# Patient Record
Sex: MALE | Race: WHITE | NOT HISPANIC OR LATINO | Employment: FULL TIME | URBAN - METROPOLITAN AREA
[De-identification: names, ages, dates, MRNs, and addresses within clinical notes are randomized per-mention and may not be internally consistent; named-entity substitution may affect disease eponyms.]

---
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
## 2021-05-18 ENCOUNTER — TELEPHONE (OUTPATIENT)
Dept: ORTHOPEDICS | Facility: CLINIC | Age: 21
End: 2021-05-18

## 2021-05-18 DIAGNOSIS — G89.29 CHRONIC PAIN OF LEFT KNEE: Primary | ICD-10-CM

## 2021-05-18 DIAGNOSIS — M25.562 CHRONIC PAIN OF LEFT KNEE: Primary | ICD-10-CM

## 2021-05-19 ENCOUNTER — OFFICE VISIT (OUTPATIENT)
Dept: ORTHOPEDICS | Facility: CLINIC | Age: 21
End: 2021-05-19
Payer: MEDICAID

## 2021-05-19 ENCOUNTER — TELEPHONE (OUTPATIENT)
Dept: ADMINISTRATIVE | Facility: OTHER | Age: 21
End: 2021-05-19

## 2021-05-19 ENCOUNTER — HOSPITAL ENCOUNTER (OUTPATIENT)
Dept: RADIOLOGY | Facility: HOSPITAL | Age: 21
Discharge: HOME | End: 2021-05-19
Attending: ORTHOPAEDIC SURGERY
Payer: MEDICAID

## 2021-05-19 VITALS
DIASTOLIC BLOOD PRESSURE: 83 MMHG | SYSTOLIC BLOOD PRESSURE: 133 MMHG | HEIGHT: 66 IN | BODY MASS INDEX: 26.31 KG/M2 | HEART RATE: 73 BPM | WEIGHT: 163.69 LBS

## 2021-05-19 DIAGNOSIS — M25.461 EFFUSION OF RIGHT KNEE: ICD-10-CM

## 2021-05-19 DIAGNOSIS — M25.561 ACUTE PAIN OF RIGHT KNEE: Primary | ICD-10-CM

## 2021-05-19 DIAGNOSIS — M25.562 CHRONIC PAIN OF LEFT KNEE: ICD-10-CM

## 2021-05-19 DIAGNOSIS — G89.29 CHRONIC PAIN OF RIGHT KNEE: ICD-10-CM

## 2021-05-19 DIAGNOSIS — G89.29 CHRONIC PAIN OF LEFT KNEE: ICD-10-CM

## 2021-05-19 DIAGNOSIS — M25.561 CHRONIC PAIN OF RIGHT KNEE: ICD-10-CM

## 2021-05-19 PROCEDURE — 99204 OFFICE O/P NEW MOD 45 MIN: CPT | Mod: S$PBB,,, | Performed by: ORTHOPAEDIC SURGERY

## 2021-05-19 PROCEDURE — 73562 X-RAY EXAM OF KNEE 3: CPT | Mod: 26,RT,, | Performed by: RADIOLOGY

## 2021-05-19 PROCEDURE — 99999 PR PBB SHADOW E&M-EST. PATIENT-LVL III: ICD-10-PCS | Mod: PBBFAC,,, | Performed by: ORTHOPAEDIC SURGERY

## 2021-05-19 PROCEDURE — 73562 X-RAY EXAM OF KNEE 3: CPT | Mod: TC,FY,RT

## 2021-05-19 PROCEDURE — 99204 PR OFFICE/OUTPT VISIT, NEW, LEVL IV, 45-59 MIN: ICD-10-PCS | Mod: S$PBB,,, | Performed by: ORTHOPAEDIC SURGERY

## 2021-05-19 PROCEDURE — 99213 OFFICE O/P EST LOW 20 MIN: CPT | Mod: PBBFAC,25,PN | Performed by: ORTHOPAEDIC SURGERY

## 2021-05-19 PROCEDURE — 73562 XR KNEE 3 VIEW RIGHT: ICD-10-PCS | Mod: 26,RT,, | Performed by: RADIOLOGY

## 2021-05-19 PROCEDURE — 99999 PR PBB SHADOW E&M-EST. PATIENT-LVL III: CPT | Mod: PBBFAC,,, | Performed by: ORTHOPAEDIC SURGERY

## 2021-05-21 ENCOUNTER — TELEPHONE (OUTPATIENT)
Dept: ADMINISTRATIVE | Facility: OTHER | Age: 21
End: 2021-05-21

## 2021-05-28 ENCOUNTER — HOSPITAL ENCOUNTER (OUTPATIENT)
Dept: RADIOLOGY | Facility: HOSPITAL | Age: 21
Discharge: HOME | End: 2021-05-28
Attending: ORTHOPAEDIC SURGERY
Payer: MEDICAID

## 2021-05-28 DIAGNOSIS — M25.461 EFFUSION OF RIGHT KNEE: ICD-10-CM

## 2021-05-28 PROCEDURE — 73721 MRI KNEE WITHOUT CONTRAST RIGHT: ICD-10-PCS | Mod: 26,RT,, | Performed by: RADIOLOGY

## 2021-05-28 PROCEDURE — 73721 MRI JNT OF LWR EXTRE W/O DYE: CPT | Mod: TC,RT

## 2021-05-28 PROCEDURE — 73721 MRI JNT OF LWR EXTRE W/O DYE: CPT | Mod: 26,RT,, | Performed by: RADIOLOGY

## 2021-06-02 ENCOUNTER — OFFICE VISIT (OUTPATIENT)
Dept: ORTHOPEDICS | Facility: CLINIC | Age: 21
End: 2021-06-02
Payer: MEDICAID

## 2021-06-02 DIAGNOSIS — G89.29 CHRONIC PAIN OF LEFT KNEE: Primary | ICD-10-CM

## 2021-06-02 DIAGNOSIS — M25.562 CHRONIC PAIN OF LEFT KNEE: Primary | ICD-10-CM

## 2021-06-02 PROCEDURE — 99499 NO LOS: ICD-10-PCS | Mod: 95,,, | Performed by: ORTHOPAEDIC SURGERY

## 2021-06-02 PROCEDURE — 99499 UNLISTED E&M SERVICE: CPT | Mod: 95,,, | Performed by: ORTHOPAEDIC SURGERY

## 2021-12-17 ENCOUNTER — TELEPHONE (OUTPATIENT)
Dept: ORTHOPEDICS | Facility: CLINIC | Age: 21
End: 2021-12-17
Payer: MEDICAID

## 2021-12-17 DIAGNOSIS — M25.511 RIGHT SHOULDER PAIN, UNSPECIFIED CHRONICITY: Primary | ICD-10-CM

## 2021-12-20 ENCOUNTER — OFFICE VISIT (OUTPATIENT)
Dept: ORTHOPEDICS | Facility: CLINIC | Age: 21
End: 2021-12-20
Payer: MEDICAID

## 2021-12-20 ENCOUNTER — HOSPITAL ENCOUNTER (OUTPATIENT)
Dept: RADIOLOGY | Facility: HOSPITAL | Age: 21
Discharge: HOME | End: 2021-12-20
Attending: ORTHOPAEDIC SURGERY
Payer: MEDICAID

## 2021-12-20 VITALS — HEIGHT: 66 IN | BODY MASS INDEX: 26.79 KG/M2 | WEIGHT: 166.69 LBS

## 2021-12-20 DIAGNOSIS — M25.511 RIGHT SHOULDER PAIN, UNSPECIFIED CHRONICITY: ICD-10-CM

## 2021-12-20 DIAGNOSIS — S43.431A TEAR OF RIGHT GLENOID LABRUM, INITIAL ENCOUNTER: Primary | ICD-10-CM

## 2021-12-20 PROCEDURE — 73030 XR SHOULDER COMPLETE 2 OR MORE VIEWS RIGHT: ICD-10-PCS | Mod: 26,RT,, | Performed by: RADIOLOGY

## 2021-12-20 PROCEDURE — 99999 PR PBB SHADOW E&M-EST. PATIENT-LVL III: ICD-10-PCS | Mod: PBBFAC,,, | Performed by: ORTHOPAEDIC SURGERY

## 2021-12-20 PROCEDURE — 20610 LARGE JOINT ASPIRATION/INJECTION: R GLENOHUMERAL: ICD-10-PCS | Mod: S$PBB,RT,, | Performed by: ORTHOPAEDIC SURGERY

## 2021-12-20 PROCEDURE — 99213 OFFICE O/P EST LOW 20 MIN: CPT | Mod: PBBFAC,PN,25 | Performed by: ORTHOPAEDIC SURGERY

## 2021-12-20 PROCEDURE — 99999 PR PBB SHADOW E&M-EST. PATIENT-LVL III: CPT | Mod: PBBFAC,,, | Performed by: ORTHOPAEDIC SURGERY

## 2021-12-20 PROCEDURE — 73030 X-RAY EXAM OF SHOULDER: CPT | Mod: TC,FY,RT

## 2021-12-20 PROCEDURE — 99214 OFFICE O/P EST MOD 30 MIN: CPT | Mod: 25,S$PBB,, | Performed by: ORTHOPAEDIC SURGERY

## 2021-12-20 PROCEDURE — 20610 DRAIN/INJ JOINT/BURSA W/O US: CPT | Mod: PBBFAC,PN | Performed by: ORTHOPAEDIC SURGERY

## 2021-12-20 PROCEDURE — 73030 X-RAY EXAM OF SHOULDER: CPT | Mod: 26,RT,, | Performed by: RADIOLOGY

## 2021-12-20 PROCEDURE — 99214 PR OFFICE/OUTPT VISIT, EST, LEVL IV, 30-39 MIN: ICD-10-PCS | Mod: 25,S$PBB,, | Performed by: ORTHOPAEDIC SURGERY

## 2021-12-20 RX ORDER — TRIAMCINOLONE ACETONIDE 40 MG/ML
40 INJECTION, SUSPENSION INTRA-ARTICULAR; INTRAMUSCULAR
Status: DISCONTINUED | OUTPATIENT
Start: 2021-12-20 | End: 2021-12-20 | Stop reason: HOSPADM

## 2021-12-20 RX ADMIN — TRIAMCINOLONE ACETONIDE 40 MG: 40 INJECTION, SUSPENSION INTRA-ARTICULAR; INTRAMUSCULAR at 03:12

## 2021-12-27 ENCOUNTER — PATIENT MESSAGE (OUTPATIENT)
Dept: ORTHOPEDICS | Facility: CLINIC | Age: 21
End: 2021-12-27
Payer: MEDICAID

## 2021-12-27 DIAGNOSIS — S43.431A TEAR OF RIGHT GLENOID LABRUM, INITIAL ENCOUNTER: Primary | ICD-10-CM

## 2021-12-27 DIAGNOSIS — M25.511 ACUTE PAIN OF RIGHT SHOULDER: ICD-10-CM

## 2022-01-14 ENCOUNTER — HOSPITAL ENCOUNTER (OUTPATIENT)
Dept: INTERVENTIONAL RADIOLOGY/VASCULAR | Facility: HOSPITAL | Age: 22
Discharge: HOME | End: 2022-01-14
Attending: ORTHOPAEDIC SURGERY
Payer: MEDICAID

## 2022-01-14 ENCOUNTER — HOSPITAL ENCOUNTER (OUTPATIENT)
Dept: RADIOLOGY | Facility: HOSPITAL | Age: 22
Discharge: HOME | End: 2022-01-14
Attending: ORTHOPAEDIC SURGERY
Payer: MEDICAID

## 2022-01-14 VITALS
WEIGHT: 165 LBS | SYSTOLIC BLOOD PRESSURE: 127 MMHG | TEMPERATURE: 98 F | BODY MASS INDEX: 26.52 KG/M2 | RESPIRATION RATE: 18 BRPM | HEART RATE: 85 BPM | DIASTOLIC BLOOD PRESSURE: 60 MMHG | HEIGHT: 66 IN | OXYGEN SATURATION: 100 %

## 2022-01-14 DIAGNOSIS — M25.511 ACUTE PAIN OF RIGHT SHOULDER: ICD-10-CM

## 2022-01-14 DIAGNOSIS — S43.431A TEAR OF RIGHT GLENOID LABRUM, INITIAL ENCOUNTER: ICD-10-CM

## 2022-01-14 PROCEDURE — 25000003 PHARM REV CODE 250: Performed by: RADIOLOGY

## 2022-01-14 PROCEDURE — 73222 MRI JOINT UPR EXTREM W/DYE: CPT | Mod: 26,RT,, | Performed by: RADIOLOGY

## 2022-01-14 PROCEDURE — 77002 NEEDLE LOCALIZATION BY XRAY: CPT | Mod: 26,,, | Performed by: RADIOLOGY

## 2022-01-14 PROCEDURE — A9585 GADOBUTROL INJECTION: HCPCS | Performed by: ORTHOPAEDIC SURGERY

## 2022-01-14 PROCEDURE — 73040 CONTRAST X-RAY OF SHOULDER: CPT | Mod: TC,RT

## 2022-01-14 PROCEDURE — 23350 INJECTION FOR SHOULDER X-RAY: CPT | Mod: RT,,, | Performed by: RADIOLOGY

## 2022-01-14 PROCEDURE — 77002 XR ARTHROGRAM SHOULDER RIGHT: ICD-10-PCS | Mod: 26,,, | Performed by: RADIOLOGY

## 2022-01-14 PROCEDURE — 23350 XR ARTHROGRAM SHOULDER RIGHT: ICD-10-PCS | Mod: RT,,, | Performed by: RADIOLOGY

## 2022-01-14 PROCEDURE — 73222 MRI ARTHROGRAM SHOULDER WITH CONTRAST RIGHT: ICD-10-PCS | Mod: 26,RT,, | Performed by: RADIOLOGY

## 2022-01-14 PROCEDURE — 73222 MRI JOINT UPR EXTREM W/DYE: CPT | Mod: TC,RT

## 2022-01-14 PROCEDURE — 25500020 PHARM REV CODE 255: Performed by: ORTHOPAEDIC SURGERY

## 2022-01-14 RX ORDER — GADOBUTROL 604.72 MG/ML
1 INJECTION INTRAVENOUS
Status: COMPLETED | OUTPATIENT
Start: 2022-01-14 | End: 2022-01-14

## 2022-01-14 RX ORDER — LIDOCAINE HYDROCHLORIDE 10 MG/ML
INJECTION INFILTRATION; PERINEURAL CODE/TRAUMA/SEDATION MEDICATION
Status: DISCONTINUED | OUTPATIENT
Start: 2022-01-14 | End: 2022-01-15 | Stop reason: HOSPADM

## 2022-01-14 RX ADMIN — LIDOCAINE HYDROCHLORIDE 5 ML: 10 INJECTION, SOLUTION INFILTRATION; PERINEURAL at 03:01

## 2022-01-14 RX ADMIN — GADOBUTROL 1 ML: 604.72 INJECTION INTRAVENOUS at 03:01

## 2022-01-14 RX ADMIN — IOHEXOL 10 ML: 300 INJECTION, SOLUTION INTRAVENOUS at 03:01

## 2022-01-14 NOTE — PROCEDURES
Interventional Radiology Immediate Post-Procedure Note    Pre-Op Diagnosis: RIGHT shoulder pain  Post-Op Diagnosis: Same    Procedure: Fluoro guided arthrogram    Procedure performed by: Luis Moss MD  Assistants: None    Estimated Blood Loss: Minimal  Specimen Removed: No    Findings/description of procedure:  9 mL of a 50-50 mixture of 1% Gadolinium and Omnipaque-300 administered into RIGHT glenohumeral joint.    No immediate complications. Patient tolerated procedure well. Please see full dictated procedure report for additional details and recommendations.      Luis Moss MD  [facility]  Pager [phone]

## 2022-01-14 NOTE — H&P
Interventional Radiology Pre-Procedure History & Physical      Chief Complaint/Reason for Referral: RIGHT shoulder pain    History of Present Illness:  Long Brunner is a 21 y.o. male who presents with right shoulder pain. Referred to IR for arthrogram prior to MRI.    Past Medical History:   Diagnosis Date    Fractures      History reviewed. No pertinent surgical history.    Allergies:   Review of patient's allergies indicates:  No Known Allergies    Home Meds:   Prior to Admission medications    Not on File       Anticoagulation/Antiplatelet Meds: no anticoagulation    Review of Systems:   Hematological: no known coagulopathies  Respiratory: no shortness of breath  Cardiovascular: no chest pain  Gastrointestinal: no abdominal pain  Genitourinary: no dysuria  Musculoskeletal: negative  Neurological: no TIA or stroke symptoms     Physical Exam:  Temp: 98.3 °F (36.8 °C) (01/14/22 1051)  Pulse: 82 (01/14/22 1051)  Resp: 18 (01/14/22 1051)  BP: (!) 146/74 (01/14/22 1051)  SpO2: 100 % (01/14/22 1051)    General: WNWD, NAD  HEENT: Normocephalic, sclera anicteric, oropharynx clear  Neck: Supple, no palpable lymphadenopathy  Heart: RRR  Lungs: Symmetric excursions, breathing unlabored  Abd: NTND, soft  Extremities: LOAIZA  Neuro: Gross nonfocal    Laboratory:  No results found for: INR  No results found for: WBC, HGB, HCT, MCV, PLT No results found for: GLU, NA, K, CL, CO2, BUN, CREATININE, CALCIUM, MG, ALT, AST, ALBUMIN, BILITOT, BILIDIR    Imaging:  XR RIGHT shoulder 12/20/21 reviewed.    Assessment/Plan:  21 y.o. male with right shoulder pain. Will undergo RIGHT shoulder MRI arthrogram today.    Sedation plan: None    Risks (including, but not limited to, pain, bleeding, infection, damage to nearby structures, treatment failure/recurrence, and the need for additional procedures), potential benefits, and alternatives were discussed with the patient. All questions were answered to the best of my abilities. The patient wishes  to proceed. Written informed consent was obtained.      Luis Moss MD  [facility]  Pager [phone]

## 2022-01-14 NOTE — DISCHARGE SUMMARY
Interventional Radiology Short Stay Discharge Summary      Admit Date: 1/14/2022  Discharge Date: 01/14/2022     Hospital Course: Uneventful    Discharge Diagnosis: RIGHT shoulder pain s/p MRI arthrogram today    Discharge Condition: Stable    Discharge Disposition: Home    Diet: Resume prior diet    Activity: Activity as tolerated    Follow-up: With referring provider      Andrew Marsala MD [facility]   Pager [phone]

## 2022-01-18 ENCOUNTER — PATIENT MESSAGE (OUTPATIENT)
Dept: ORTHOPEDICS | Facility: CLINIC | Age: 22
End: 2022-01-18
Payer: MEDICAID

## 2022-01-19 ENCOUNTER — PATIENT MESSAGE (OUTPATIENT)
Dept: ORTHOPEDICS | Facility: CLINIC | Age: 22
End: 2022-01-19

## 2022-01-19 ENCOUNTER — OFFICE VISIT (OUTPATIENT)
Dept: ORTHOPEDICS | Facility: CLINIC | Age: 22
End: 2022-01-19
Payer: MEDICAID

## 2022-01-19 DIAGNOSIS — M75.101 TEAR OF RIGHT SUPRASPINATUS TENDON: ICD-10-CM

## 2022-01-19 DIAGNOSIS — S43.431A SUPERIOR GLENOID LABRUM LESION OF RIGHT SHOULDER, INITIAL ENCOUNTER: Primary | ICD-10-CM

## 2022-01-19 PROCEDURE — 99213 OFFICE O/P EST LOW 20 MIN: CPT | Mod: 95,,, | Performed by: ORTHOPAEDIC SURGERY

## 2022-01-19 PROCEDURE — 99213 PR OFFICE/OUTPT VISIT, EST, LEVL III, 20-29 MIN: ICD-10-PCS | Mod: 95,,, | Performed by: ORTHOPAEDIC SURGERY

## 2022-01-19 NOTE — PROGRESS NOTES
[facility], Orthopedics and Sports Medicine  [facility]    Audio Only Telehealth Visit  01/19/2022     Diagnosis: Right shoulder SLAP tear, Right partial articular sided supraspinatus tear  Procedure: 12/20/2021 right shoulder GH CSI     The patient location is: [place], [place]   The chief complaint leading to consultation is: right shoulder pain  Visit type: Virtual visit with audio only (telephone)  Total time spent with patient: 18 minutes     The reason for the audio only service rather than synchronous audio and video virtual visit was related to technical difficulties or patient preference/necessity.     Each patient to whom I provide medical services by telemedicine is:  (1) informed of the relationship between the physician and patient and the respective role of any other health care provider with respect to management of the patient; and (2) notified that they may decline to receive medical services by telemedicine and may withdraw from such care at any time. Patient verbally consented to receive this service via voice-only telephone call.    Subjective:      Long Brunner is a 21 y.o. male who follows up for right shoulder pain.  I saw the patient in the office on 12/20/2021 and administered a GH CSI for his persistent shoulder pain. He had symptoms of shoulder pain and mechanical clicking with shoulder motion.  We had suspicion for a labral tear but tried initial treatment with an injection and PT.  The patient had onset of worsening shoulder pain after the injection and was unable to do PT, therefore we ordered an MRI-arthrogram of the right shoulder.  He now presents to discuss results.  He continues with shoulder pain and mechanical symptoms.  His mother and father are on the phone for the office visit.         Objective:      Imaging:  MR-arthrogram of the right shoulder taken taken 1/14/2022 was personally reviewed from the [facility] EMR.  Multiple T1 and T2 sequences  including axial, coronal, and sagittal views were reviewed.  No acute fractures or dislocations are noted in these images.  There is evidence of a SLAP tear.  There is a partial articular sided tear of the supraspinatus tendon with a small cyst noted at the insertion of the supraspinatus tendon. The remainder of the rotator cuff and articular surface is intact.         Assessment:       Long Brunner is a 21 y.o. male seen today via a telemedicine visit. The primary encounter diagnosis was Superior glenoid labrum lesion of right shoulder, initial encounter. A diagnosis of Tear of right supraspinatus tendon was also pertinent to this visit.  Further discussion  is recommended at this time to decide if the patient wants to proceed with therapy or proceed with surgery.  I had a discussion with the family to explain the problem and the possible treatments including the issues with SLAP repair in a throwing athlete.  The family is going to think about the options and when they are ready will return to the office for reassessment of the treatment plan. The natural history and expected course discussed with patient. Various treatment options were discussed, including their risks and benefits. All of the patient's questions were answered.     Plan:      1. Tylenol 650mg TID, PRN pain.   2. Therapy exercises at home  3. Follow up in clinic as needed          Tj Fletcher IV, MD   of Clinical Orthopedics  Department of Orthopedic Surgery  [facility]  Office: [phone]  Website: [email or web address]    ---------------------------------------  No orders of the defined types were placed in this encounter.       This service was not originating from a related E/M service provided within the previous 7 days nor will  to an E/M service or procedure within the next 24 hours or my soonest available appointment.  Prevailing standard of care was able to be met in this audio-only  visit.

## 2022-07-18 ENCOUNTER — HOSPITAL ENCOUNTER (EMERGENCY)
Facility: HOSPITAL | Age: 22
Discharge: HOME | End: 2022-07-18
Attending: PEDIATRICS
Payer: MEDICAID

## 2022-07-18 VITALS
HEART RATE: 77 BPM | OXYGEN SATURATION: 98 % | DIASTOLIC BLOOD PRESSURE: 77 MMHG | SYSTOLIC BLOOD PRESSURE: 142 MMHG | TEMPERATURE: 99 F | BODY MASS INDEX: 27.32 KG/M2 | WEIGHT: 170 LBS | RESPIRATION RATE: 16 BRPM | HEIGHT: 66 IN

## 2022-07-18 DIAGNOSIS — S39.012A STRAIN OF LUMBAR REGION, INITIAL ENCOUNTER: ICD-10-CM
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
## 2022-07-18 PROCEDURE — 99283 EMERGENCY DEPT VISIT LOW MDM: CPT

## 2022-07-18 PROCEDURE — 25000003 PHARM REV CODE 250: Performed by: STUDENT IN AN ORGANIZED HEALTH CARE EDUCATION/TRAINING PROGRAM

## 2022-07-18 PROCEDURE — 99284 PR EMERGENCY DEPT VISIT,LEVEL IV: ICD-10-PCS | Mod: ,,, | Performed by: PEDIATRICS

## 2022-07-18 PROCEDURE — 99284 EMERGENCY DEPT VISIT MOD MDM: CPT | Mod: ,,, | Performed by: PEDIATRICS

## 2022-07-18 RX ORDER — IBUPROFEN 600 MG/1
600 TABLET ORAL
Status: COMPLETED | OUTPATIENT
Start: 2022-07-18 | End: 2022-07-18

## 2022-07-18 RX ADMIN — IBUPROFEN 600 MG: 600 TABLET, FILM COATED ORAL at 05:07

## 2022-07-18 NOTE — DISCHARGE INSTRUCTIONS
For pain take  Ibuprofen 600mg every 6 hours as needed  Tylenol = Acetaminophen every 6hrs as needed    You can apply hot or cold packs as needed to the painful area, use them for 15 minutes at a time with breaks in between    Do not take ibuprofen, naproxen, advil, or aleve every day for more than 2-3 weeks without following up with your primary care provider, as they can cause stomach pain and other complications if used every day over long periods of time
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
## 2022-10-03 ENCOUNTER — TELEPHONE (OUTPATIENT)
Dept: ORTHOPEDICS | Facility: CLINIC | Age: 22
End: 2022-10-03
Payer: MEDICAID

## 2022-10-04 ENCOUNTER — TELEPHONE (OUTPATIENT)
Dept: ORTHOPEDICS | Facility: CLINIC | Age: 22
End: 2022-10-04
Payer: MEDICAID

## 2022-10-20 ENCOUNTER — OFFICE VISIT (OUTPATIENT)
Dept: ORTHOPEDICS | Facility: CLINIC | Age: 22
End: 2022-10-20
Payer: MEDICAID

## 2022-10-20 ENCOUNTER — HOSPITAL ENCOUNTER (OUTPATIENT)
Dept: RADIOLOGY | Facility: HOSPITAL | Age: 22
Discharge: HOME | End: 2022-10-20
Attending: ORTHOPAEDIC SURGERY
Payer: MEDICAID

## 2022-10-20 VITALS
SYSTOLIC BLOOD PRESSURE: 134 MMHG | WEIGHT: 173.94 LBS | DIASTOLIC BLOOD PRESSURE: 81 MMHG | BODY MASS INDEX: 27.95 KG/M2 | HEIGHT: 66 IN | HEART RATE: 67 BPM

## 2022-10-20 DIAGNOSIS — S43.431D SUPERIOR GLENOID LABRUM LESION OF RIGHT SHOULDER, SUBSEQUENT ENCOUNTER: Primary | ICD-10-CM

## 2022-10-20 DIAGNOSIS — M75.101 TEAR OF RIGHT SUPRASPINATUS TENDON: ICD-10-CM

## 2022-10-20 DIAGNOSIS — Z01.818 PREOP EXAMINATION: ICD-10-CM

## 2022-10-20 PROCEDURE — 3075F SYST BP GE 130 - 139MM HG: CPT | Mod: CPTII,,, | Performed by: ORTHOPAEDIC SURGERY

## 2022-10-20 PROCEDURE — 3075F PR MOST RECENT SYSTOLIC BLOOD PRESS GE 130-139MM HG: ICD-10-PCS | Mod: CPTII,,, | Performed by: ORTHOPAEDIC SURGERY

## 2022-10-20 PROCEDURE — 71045 X-RAY EXAM CHEST 1 VIEW: CPT | Mod: 26,,, | Performed by: RADIOLOGY

## 2022-10-20 PROCEDURE — 99214 PR OFFICE/OUTPT VISIT, EST, LEVL IV, 30-39 MIN: ICD-10-PCS | Mod: S$PBB,,, | Performed by: ORTHOPAEDIC SURGERY

## 2022-10-20 PROCEDURE — 99214 OFFICE O/P EST MOD 30 MIN: CPT | Mod: S$PBB,,, | Performed by: ORTHOPAEDIC SURGERY

## 2022-10-20 PROCEDURE — 1159F PR MEDICATION LIST DOCUMENTED IN MEDICAL RECORD: ICD-10-PCS | Mod: CPTII,,, | Performed by: ORTHOPAEDIC SURGERY

## 2022-10-20 PROCEDURE — 1159F MED LIST DOCD IN RCRD: CPT | Mod: CPTII,,, | Performed by: ORTHOPAEDIC SURGERY

## 2022-10-20 PROCEDURE — 3079F DIAST BP 80-89 MM HG: CPT | Mod: CPTII,,, | Performed by: ORTHOPAEDIC SURGERY

## 2022-10-20 PROCEDURE — 99999 PR PBB SHADOW E&M-EST. PATIENT-LVL IV: CPT | Mod: PBBFAC,,, | Performed by: ORTHOPAEDIC SURGERY

## 2022-10-20 PROCEDURE — 71045 XR CHEST 1 VIEW PRE-OP: ICD-10-PCS | Mod: 26,,, | Performed by: RADIOLOGY

## 2022-10-20 PROCEDURE — 99214 OFFICE O/P EST MOD 30 MIN: CPT | Mod: PBBFAC,PN,25 | Performed by: ORTHOPAEDIC SURGERY

## 2022-10-20 PROCEDURE — 71045 X-RAY EXAM CHEST 1 VIEW: CPT | Mod: TC,FY

## 2022-10-20 PROCEDURE — 3079F PR MOST RECENT DIASTOLIC BLOOD PRESSURE 80-89 MM HG: ICD-10-PCS | Mod: CPTII,,, | Performed by: ORTHOPAEDIC SURGERY

## 2022-10-20 PROCEDURE — 99999 PR PBB SHADOW E&M-EST. PATIENT-LVL IV: ICD-10-PCS | Mod: PBBFAC,,, | Performed by: ORTHOPAEDIC SURGERY

## 2022-10-20 NOTE — PROGRESS NOTES
[facility], Orthopedics and Sports Medicine  [facility]    Established Patient Office Visit  10/20/2022     Diagnosis:  Right shoulder SLAP tear, partial articular sided supraspinatus tendon tear    Procedure:   (12/20/2021) Right GH CSI      Subjective:      Long Brunner is a 22 y.o. male who presents for follow up treatment of the above mentioned diagnosis.  Overall the patient's symptoms are worsening.  The patient previously attempted physical therapy but was unable to continue due to pain.  He had advanced imaging which demonstrated the above pathology.  I previously discussed treatment and he now presents with his mom to discuss surgery.     The patient continues with painful clicking when raising his arm over 90degrees then another click when lowering his arm.  Click is felt anterior and posterior shoulder.  He is unable to throw a softball without pain and is limited in his recreational activities.  He has difficulty doing his job as  due to pain with overhead activities.      He denies neurologic complaints in the right arm.      Outside reports reviewed: historical medical records and office notes.    Past Medical History:   Diagnosis Date    Fractures        Patient Active Problem List   Diagnosis    Superior glenoid labrum lesion of right shoulder    Tear of right supraspinatus tendon       No past surgical history on file.     No current outpatient medications     Review of patient's allergies indicates:  No Known Allergies    Social History     Socioeconomic History    Marital status: Single   Tobacco Use    Smoking status: Never    Smokeless tobacco: Never       No family history on file.      Review of Systems   Constitutional: Negative for chills and fever.   HENT:  Negative for hearing loss.    Eyes:  Negative for blurred vision.   Cardiovascular:  Negative for chest pain.   Respiratory:  Negative for shortness of breath.    Gastrointestinal:  Negative for abdominal  pain.   Neurological:  Negative for light-headedness.        Objective:      General    Nursing note and vitals reviewed.  Constitutional: He is oriented to person, place, and time. He appears well-developed and well-nourished. No distress.   HENT:   Head: Normocephalic and atraumatic.   Eyes: Pupils are equal, round, and reactive to light.   Cardiovascular:  Normal rate and regular rhythm.            Pulmonary/Chest: Effort normal and breath sounds normal. No respiratory distress.   Neurological: He is alert and oriented to person, place, and time.   Psychiatric: He has a normal mood and affect. His behavior is normal.         Right Shoulder Exam     Inspection/Observation   Swelling: absent  Bruising: absent  Atrophy: absent    Tenderness   The patient is tender to palpation of the biceps tendon.    Range of Motion   Active abduction:  170 normal   Forward Flexion:  170 normal   External Rotation 0 degrees:  50 normal   Internal rotation 0 degrees:  T8 normal     Tests & Signs   Apprehension: negative  Drop arm: negative  Crump test: negative  Impingement: negative  Belly Press: negative  Active Compression Test (Barber's Sign): negative  Speed's Test: negative  Anterior Drawer Test: 0   Posterior Drawer Test: 0  Relocation 90 degrees: negative  Jerk Test: negative    Other   Sensation: normal    Comments:  Mady test- negative    Reproducible click when patient abducts arm from 80-90 degrees in the throwing motion which I am now able to create with passive shoulder motion    Left Shoulder Exam     Inspection/Observation   Swelling: absent  Bruising: absent  Atrophy: absent    Tenderness   The patient is experiencing no tenderness.     Range of Motion   Active abduction:  170 normal   Forward Flexion:  170 normal   External Rotation 0 degrees:  50 normal   Internal rotation 0 degrees:  T8 normal     Tests & Signs   Drop arm: negative  Crump test: negative  Impingement: negative  Belly Press: negative  Active  Compression test ([place]'s Sign): negative  Speed's Test: negative    Other   Sensation: normal     Comments:  Mady test- negative      Muscle Strength   Right Upper Extremity   Shoulder Abduction: 5/5   Shoulder Internal Rotation: 5/5   Shoulder External Rotation: 5/5   Biceps: 5/5   Left Upper Extremity  Shoulder Abduction: 5/5   Shoulder Internal Rotation: 5/5   Shoulder External Rotation: 5/5   Biceps: 5/5     Reflexes     Left Side  Biceps:  2+  Triceps:  2+  Brachioradialis:  2+  Left Mulligan's Sign:  Absent    Right Side   Biceps:  2+  Triceps:  2+  Brachioradialis:  2+  Right Mulligan's Sign:  absent    Vascular Exam     Right Pulses      Radial:                    2+      Left Pulses      Radial:                    2+      Imaging:  Radiographs of the right shoulder taken 12/20/2021 were personally reviewed from the [facility].  Multiple views of the shoulder are available today for review, including an AP, scapular Y, axillary view.  The glenohumeral joint demonstrates minimal degenerative changes.  The acromioclavicular joint demonstrates minimal degenerative changes.  The glenohumeral joint is concentrically reduced.  There is no acute fracture or dislocation.     MR-arthrogram of the right shoulder taken taken 1/14/2022 was personally reviewed from the [facility].  Multiple T1 and T2 sequences including axial, coronal, and sagittal views were reviewed.  No acute fractures or dislocations are noted in these images.  There is evidence of a SLAP tear.  There is a partial articular sided tear of the supraspinatus tendon with a small cyst noted at the insertion of the supraspinatus tendon. The remainder of the rotator cuff and articular surface is intact.       Assessment:       Long Brunner is a 22 y.o. male seen in the office today. The primary encounter diagnosis was Superior glenoid labrum lesion of right shoulder, subsequent encounter. Diagnoses of Tear of right supraspinatus tendon and Preop  examination were also pertinent to this visit.  Operative treatment with right shoulder surgery  is recommended at this time. I had a long discussion about the problem in his shoulder.  I discussed possible repair of a SLAP tear or a labrum repair or biceps tenodesis if indicated.  We discussed the possible decrease in range of motion of the shoulder, which would change his throwing arc.  He understands will need to do a return to throwing program after surgery.  We will evaluate the rotator cuff during the surgery as well and repair if needed. The natural history and expected course discussed with patient. Various treatment options were discussed, including their risks and benefits. All of the patient's questions were answered.     Plan:      Surgical treatment right shoulder arthroscopy glenoid labrum repair possible SLAP repair, possible biceps tenodesis, possible rotator cuff repair .  Surgical consent for surgery obtained during office visit.  Expected date of surgery: 11/1/2022.  Patient will NOT require preoperative medical evaluation prior to surgery. Preoperative labs/chest xray ordered.  Post operative physical therapy ordered.         Tj Fletcher IV, MD   of Clinical Orthopedics  Department of Orthopedic Surgery  [facility]  Office: [phone]  Website: [email or web address]    ---------------------------------------  Orders Placed This Encounter    X-Ray Chest 1 View Pre-OP    CBC Auto Differential    Basic Metabolic Panel    Protime-INR    APTT    Ambulatory referral/consult to Physical/Occupational Therapy    Case Request Operating Room: REPAIR, SLAP LESION, SHOULDER, Labrum repair, possible biceps tenodesis

## 2022-10-25 ENCOUNTER — TELEPHONE (OUTPATIENT)
Dept: ORTHOPEDICS | Facility: CLINIC | Age: 22
End: 2022-10-25
Payer: MEDICAID

## 2022-10-25 DIAGNOSIS — G89.18 POST-OP PAIN: Primary | ICD-10-CM

## 2022-11-01 ENCOUNTER — ANESTHESIA EVENT (OUTPATIENT)
Dept: SURGERY | Facility: HOSPITAL | Age: 22
End: 2022-11-01
Payer: MEDICAID

## 2022-11-01 ENCOUNTER — HOSPITAL ENCOUNTER (OUTPATIENT)
Facility: HOSPITAL | Age: 22
Discharge: HOME | End: 2022-11-01
Attending: ORTHOPAEDIC SURGERY | Admitting: ORTHOPAEDIC SURGERY
Payer: MEDICAID

## 2022-11-01 ENCOUNTER — ANESTHESIA (OUTPATIENT)
Dept: SURGERY | Facility: HOSPITAL | Age: 22
End: 2022-11-01
Payer: MEDICAID

## 2022-11-01 VITALS
RESPIRATION RATE: 20 BRPM | TEMPERATURE: 98 F | DIASTOLIC BLOOD PRESSURE: 68 MMHG | BODY MASS INDEX: 27.32 KG/M2 | HEIGHT: 66 IN | SYSTOLIC BLOOD PRESSURE: 125 MMHG | OXYGEN SATURATION: 97 % | WEIGHT: 170 LBS | HEART RATE: 61 BPM

## 2022-11-01 DIAGNOSIS — S43.431D SUPERIOR GLENOID LABRUM LESION OF RIGHT SHOULDER, SUBSEQUENT ENCOUNTER: Primary | ICD-10-CM

## 2022-11-01 DIAGNOSIS — M75.101 TEAR OF RIGHT SUPRASPINATUS TENDON: ICD-10-CM

## 2022-11-01 DIAGNOSIS — S43.431A GLENOID LABRUM TEAR, RIGHT, INITIAL ENCOUNTER: ICD-10-CM

## 2022-11-01 DIAGNOSIS — S43.431A SUPERIOR GLENOID LABRUM LESION OF RIGHT SHOULDER, INITIAL ENCOUNTER: ICD-10-CM

## 2022-11-01 DIAGNOSIS — M25.519 SHOULDER PAIN: ICD-10-CM

## 2022-11-01 PROCEDURE — 37000009 HC ANESTHESIA EA ADD 15 MINS: Performed by: ORTHOPAEDIC SURGERY

## 2022-11-01 PROCEDURE — 63600175 PHARM REV CODE 636 W HCPCS: Performed by: NURSE ANESTHETIST, CERTIFIED REGISTERED

## 2022-11-01 PROCEDURE — 01630 ANES OPN/ARTHR PX SHO JT NOS: CPT | Performed by: ORTHOPAEDIC SURGERY

## 2022-11-01 PROCEDURE — 25000003 PHARM REV CODE 250: Performed by: STUDENT IN AN ORGANIZED HEALTH CARE EDUCATION/TRAINING PROGRAM

## 2022-11-01 PROCEDURE — C1713 ANCHOR/SCREW BN/BN,TIS/BN: HCPCS | Performed by: ORTHOPAEDIC SURGERY

## 2022-11-01 PROCEDURE — 29823 PR SHLDR ARTHROSCOP,EXTEN DEBRIDE: ICD-10-PCS | Mod: 59,51,RT, | Performed by: ORTHOPAEDIC SURGERY

## 2022-11-01 PROCEDURE — 64415 PR NERVE BLOCK INJ, ANES/STEROID, BRACHIAL PLEXUS, INCL IMAG GUIDANCE: ICD-10-PCS | Mod: 59,RT,, | Performed by: NURSE ANESTHETIST, CERTIFIED REGISTERED

## 2022-11-01 PROCEDURE — 71000016 HC POSTOP RECOV ADDL HR: Performed by: ORTHOPAEDIC SURGERY

## 2022-11-01 PROCEDURE — 63600175 PHARM REV CODE 636 W HCPCS: Performed by: STUDENT IN AN ORGANIZED HEALTH CARE EDUCATION/TRAINING PROGRAM

## 2022-11-01 PROCEDURE — 29806 PR SHLDR ARTHROSCOP,SURG,CAPSULORRHAPHY: ICD-10-PCS | Mod: RT,,, | Performed by: ORTHOPAEDIC SURGERY

## 2022-11-01 PROCEDURE — 76942 ECHO GUIDE FOR BIOPSY: CPT | Performed by: ANESTHESIOLOGY

## 2022-11-01 PROCEDURE — 29806 SHO ARTHRS SRG CAPSULORRAPHY: CPT | Mod: RT,,, | Performed by: ORTHOPAEDIC SURGERY

## 2022-11-01 PROCEDURE — D9220A PRA ANESTHESIA: Mod: ,,, | Performed by: NURSE ANESTHETIST, CERTIFIED REGISTERED

## 2022-11-01 PROCEDURE — 63600175 PHARM REV CODE 636 W HCPCS: Performed by: ANESTHESIOLOGY

## 2022-11-01 PROCEDURE — 25000003 PHARM REV CODE 250: Performed by: NURSE ANESTHETIST, CERTIFIED REGISTERED

## 2022-11-01 PROCEDURE — 64415 NJX AA&/STRD BRCH PLXS IMG: CPT | Mod: 59,RT,, | Performed by: NURSE ANESTHETIST, CERTIFIED REGISTERED

## 2022-11-01 PROCEDURE — 25000003 PHARM REV CODE 250: Performed by: ANESTHESIOLOGY

## 2022-11-01 PROCEDURE — 25000003 PHARM REV CODE 250: Performed by: ORTHOPAEDIC SURGERY

## 2022-11-01 PROCEDURE — 63600175 PHARM REV CODE 636 W HCPCS: Performed by: ORTHOPAEDIC SURGERY

## 2022-11-01 PROCEDURE — D9220A PRA ANESTHESIA: ICD-10-PCS | Mod: ,,, | Performed by: NURSE ANESTHETIST, CERTIFIED REGISTERED

## 2022-11-01 PROCEDURE — 71000033 HC RECOVERY, INTIAL HOUR: Performed by: ORTHOPAEDIC SURGERY

## 2022-11-01 PROCEDURE — 36000711: Performed by: ORTHOPAEDIC SURGERY

## 2022-11-01 PROCEDURE — 37000008 HC ANESTHESIA 1ST 15 MINUTES: Performed by: ORTHOPAEDIC SURGERY

## 2022-11-01 PROCEDURE — 27201423 OPTIME MED/SURG SUP & DEVICES STERILE SUPPLY: Performed by: ORTHOPAEDIC SURGERY

## 2022-11-01 PROCEDURE — 29823 SHO ARTHRS SRG XTNSV DBRDMT: CPT | Mod: 59,51,RT, | Performed by: ORTHOPAEDIC SURGERY

## 2022-11-01 PROCEDURE — 36000710: Performed by: ORTHOPAEDIC SURGERY

## 2022-11-01 PROCEDURE — 71000015 HC POSTOP RECOV 1ST HR: Performed by: ORTHOPAEDIC SURGERY

## 2022-11-01 DEVICE — FIBERTAK
Type: IMPLANTABLE DEVICE | Site: SHOULDER | Status: FUNCTIONAL
Brand: ARTHREX®

## 2022-11-01 RX ORDER — HYDROMORPHONE HYDROCHLORIDE 2 MG/ML
0.5 INJECTION, SOLUTION INTRAMUSCULAR; INTRAVENOUS; SUBCUTANEOUS EVERY 5 MIN PRN
Status: DISCONTINUED | OUTPATIENT
Start: 2022-11-01 | End: 2022-11-01 | Stop reason: HOSPADM

## 2022-11-01 RX ORDER — ONDANSETRON 2 MG/ML
INJECTION INTRAMUSCULAR; INTRAVENOUS
Status: DISCONTINUED | OUTPATIENT
Start: 2022-11-01 | End: 2022-11-01

## 2022-11-01 RX ORDER — BUPIVACAINE HYDROCHLORIDE 2.5 MG/ML
INJECTION, SOLUTION EPIDURAL; INFILTRATION; INTRACAUDAL
Status: COMPLETED | OUTPATIENT
Start: 2022-11-01 | End: 2022-11-01

## 2022-11-01 RX ORDER — ROCURONIUM BROMIDE 10 MG/ML
INJECTION, SOLUTION INTRAVENOUS
Status: DISCONTINUED | OUTPATIENT
Start: 2022-11-01 | End: 2022-11-01

## 2022-11-01 RX ORDER — PREGABALIN 75 MG/1
300 CAPSULE ORAL
Status: COMPLETED | OUTPATIENT
Start: 2022-11-01 | End: 2022-11-01

## 2022-11-01 RX ORDER — MIDAZOLAM HYDROCHLORIDE 1 MG/ML
INJECTION, SOLUTION INTRAMUSCULAR; INTRAVENOUS
Status: DISCONTINUED | OUTPATIENT
Start: 2022-11-01 | End: 2022-11-01

## 2022-11-01 RX ORDER — ONDANSETRON 2 MG/ML
4 INJECTION INTRAMUSCULAR; INTRAVENOUS DAILY PRN
Status: DISCONTINUED | OUTPATIENT
Start: 2022-11-01 | End: 2022-11-01 | Stop reason: HOSPADM

## 2022-11-01 RX ORDER — SODIUM CHLORIDE 0.9 % (FLUSH) 0.9 %
3 SYRINGE (ML) INJECTION
Status: DISCONTINUED | OUTPATIENT
Start: 2022-11-01 | End: 2022-11-01 | Stop reason: HOSPADM

## 2022-11-01 RX ORDER — CELECOXIB 200 MG/1
200 CAPSULE ORAL 2 TIMES DAILY
Qty: 28 CAPSULE | Refills: 0 | Status: SHIPPED | OUTPATIENT
Start: 2022-11-01 | End: 2022-11-15

## 2022-11-01 RX ORDER — TRAMADOL HYDROCHLORIDE 50 MG/1
50 TABLET ORAL EVERY 8 HOURS PRN
Qty: 21 TABLET | Refills: 0 | Status: SHIPPED | OUTPATIENT
Start: 2022-11-01 | End: 2022-11-08

## 2022-11-01 RX ORDER — ACETAMINOPHEN 500 MG
1000 TABLET ORAL
Status: COMPLETED | OUTPATIENT
Start: 2022-11-01 | End: 2022-11-01

## 2022-11-01 RX ORDER — CEFAZOLIN SODIUM 2 G/50ML
2 SOLUTION INTRAVENOUS
Status: COMPLETED | OUTPATIENT
Start: 2022-11-01 | End: 2022-11-01

## 2022-11-01 RX ORDER — EPINEPHRINE 1 MG/ML
INJECTION, SOLUTION, CONCENTRATE INTRAVENOUS
Status: DISCONTINUED | OUTPATIENT
Start: 2022-11-01 | End: 2022-11-01 | Stop reason: HOSPADM

## 2022-11-01 RX ORDER — PROPOFOL 10 MG/ML
VIAL (ML) INTRAVENOUS
Status: DISCONTINUED | OUTPATIENT
Start: 2022-11-01 | End: 2022-11-01

## 2022-11-01 RX ORDER — ASPIRIN 81 MG/1
81 TABLET ORAL DAILY
Qty: 30 TABLET | Refills: 0 | Status: SHIPPED | OUTPATIENT
Start: 2022-11-01 | End: 2023-02-02

## 2022-11-01 RX ORDER — ACETAMINOPHEN 500 MG
1000 TABLET ORAL EVERY 8 HOURS
Qty: 180 TABLET | Refills: 0 | Status: SHIPPED | OUTPATIENT
Start: 2022-11-01 | End: 2022-12-01

## 2022-11-01 RX ORDER — FENTANYL CITRATE 50 UG/ML
INJECTION, SOLUTION INTRAMUSCULAR; INTRAVENOUS
Status: DISCONTINUED | OUTPATIENT
Start: 2022-11-01 | End: 2022-11-01

## 2022-11-01 RX ORDER — CELECOXIB 100 MG/1
200 CAPSULE ORAL
Status: COMPLETED | OUTPATIENT
Start: 2022-11-01 | End: 2022-11-01

## 2022-11-01 RX ORDER — MIDAZOLAM HYDROCHLORIDE 1 MG/ML
INJECTION INTRAMUSCULAR; INTRAVENOUS
Status: COMPLETED
Start: 2022-11-01 | End: 2022-11-01

## 2022-11-01 RX ORDER — LIDOCAINE HYDROCHLORIDE AND EPINEPHRINE 10; 10 MG/ML; UG/ML
INJECTION, SOLUTION INFILTRATION; PERINEURAL
Status: DISCONTINUED | OUTPATIENT
Start: 2022-11-01 | End: 2022-11-01 | Stop reason: HOSPADM

## 2022-11-01 RX ORDER — GABAPENTIN 300 MG/1
300 CAPSULE ORAL NIGHTLY
Qty: 14 CAPSULE | Refills: 0 | Status: SHIPPED | OUTPATIENT
Start: 2022-11-01 | End: 2022-11-15

## 2022-11-01 RX ORDER — LIDOCAINE HYDROCHLORIDE 20 MG/ML
INJECTION INTRAVENOUS
Status: DISCONTINUED | OUTPATIENT
Start: 2022-11-01 | End: 2022-11-01

## 2022-11-01 RX ORDER — DIPHENHYDRAMINE HYDROCHLORIDE 50 MG/ML
12.5 INJECTION INTRAMUSCULAR; INTRAVENOUS EVERY 6 HOURS PRN
Status: DISCONTINUED | OUTPATIENT
Start: 2022-11-01 | End: 2022-11-01 | Stop reason: HOSPADM

## 2022-11-01 RX ORDER — DEXAMETHASONE SODIUM PHOSPHATE 4 MG/ML
INJECTION, SOLUTION INTRA-ARTICULAR; INTRALESIONAL; INTRAMUSCULAR; INTRAVENOUS; SOFT TISSUE
Status: DISCONTINUED | OUTPATIENT
Start: 2022-11-01 | End: 2022-11-01

## 2022-11-01 RX ADMIN — LIDOCAINE HYDROCHLORIDE 100 MG: 20 INJECTION, SOLUTION INTRAVENOUS at 07:11

## 2022-11-01 RX ADMIN — GLYCOPYRROLATE 0.2 MG: 0.2 INJECTION, SOLUTION INTRAMUSCULAR; INTRAVITREAL at 07:11

## 2022-11-01 RX ADMIN — PHENYLEPHRINE HYDROCHLORIDE 0.35 MCG/KG/MIN: 10 INJECTION INTRAVENOUS at 07:11

## 2022-11-01 RX ADMIN — MIDAZOLAM 4 MG: 1 INJECTION INTRAMUSCULAR; INTRAVENOUS at 06:11

## 2022-11-01 RX ADMIN — PREGABALIN 300 MG: 75 CAPSULE ORAL at 06:11

## 2022-11-01 RX ADMIN — SODIUM CHLORIDE, SODIUM LACTATE, POTASSIUM CHLORIDE, AND CALCIUM CHLORIDE: .6; .31; .03; .02 INJECTION, SOLUTION INTRAVENOUS at 06:11

## 2022-11-01 RX ADMIN — BUPIVACAINE HYDROCHLORIDE 10 ML: 2.5 INJECTION, SOLUTION EPIDURAL; INFILTRATION; INTRACAUDAL; PERINEURAL at 06:11

## 2022-11-01 RX ADMIN — PROPOFOL 200 MG: 10 INJECTION, EMULSION INTRAVENOUS at 07:11

## 2022-11-01 RX ADMIN — ONDANSETRON 8 MG: 2 INJECTION, SOLUTION INTRAMUSCULAR; INTRAVENOUS at 09:11

## 2022-11-01 RX ADMIN — CEFAZOLIN SODIUM 2 G: 2 SOLUTION INTRAVENOUS at 07:11

## 2022-11-01 RX ADMIN — DEXAMETHASONE SODIUM PHOSPHATE 8 MG: 4 INJECTION, SOLUTION INTRA-ARTICULAR; INTRALESIONAL; INTRAMUSCULAR; INTRAVENOUS; SOFT TISSUE at 07:11

## 2022-11-01 RX ADMIN — HYPROMELLOSE 2910 2 DROP: 5 SOLUTION OPHTHALMIC at 07:11

## 2022-11-01 RX ADMIN — FENTANYL CITRATE 100 MCG: 50 INJECTION, SOLUTION INTRAMUSCULAR; INTRAVENOUS at 07:11

## 2022-11-01 RX ADMIN — CELECOXIB 200 MG: 100 CAPSULE ORAL at 06:11

## 2022-11-01 RX ADMIN — SODIUM CHLORIDE, SODIUM LACTATE, POTASSIUM CHLORIDE, AND CALCIUM CHLORIDE: .6; .31; .03; .02 INJECTION, SOLUTION INTRAVENOUS at 07:11

## 2022-11-01 RX ADMIN — ROCURONIUM BROMIDE 50 MG: 10 INJECTION, SOLUTION INTRAVENOUS at 07:11

## 2022-11-01 RX ADMIN — SUGAMMADEX 200 MG: 100 INJECTION, SOLUTION INTRAVENOUS at 09:11

## 2022-11-01 RX ADMIN — ACETAMINOPHEN 1000 MG: 500 TABLET ORAL at 06:11

## 2022-11-01 NOTE — ANESTHESIA POSTPROCEDURE EVALUATION
Anesthesia Post Evaluation    Patient: Long Brunner    Procedure(s) Performed: Procedure(s) (LRB):  REPAIR, SLAP LESION, SHOULDER, Labrum repair, possible biceps tenodesis (Right)    Final Anesthesia Type: general      Patient location during evaluation: PACU  Patient participation: Yes- Able to Participate  Level of consciousness: awake and alert, oriented and awake  Post-procedure vital signs: reviewed and stable  Pain management: adequate  Airway patency: patent    PONV status at discharge: No PONV  Anesthetic complications: no      Cardiovascular status: blood pressure returned to baseline  Respiratory status: unassisted and room air  Hydration status: euvolemic  Follow-up not needed.          Vitals Value Taken Time   /64 11/01/22 1100   Temp 36.4 °C (97.5 °F) 11/01/22 1100   Pulse 60 11/01/22 1100   Resp 18 11/01/22 1100   SpO2 96 % 11/01/22 1100         Event Time   Out of Recovery 10:50:57         Pain/Vernell Score: Pain Rating Prior to Med Admin: 3 (11/1/2022  6:09 AM)  Vernell Score: 9 (11/1/2022 11:00 AM)

## 2022-11-01 NOTE — BRIEF OP NOTE
[facility] - Surgery (Hospital)  Brief Operative Note    Surgery Date: 11/1/2022     Surgeon(s) and Role:     * Tj Fletcher IV, MD - Primary    Assisting Surgeon: CESAR Leonardo MD    Pre-op Diagnosis:  Superior glenoid labrum lesion of right shoulder, subsequent encounter [S55.489T] Partial Articular sided Rotator Cuff Tear    Post-op Diagnosis:  Same as preop with additional Posterior labrum tear and bursitis of the subacromial space    Procedure(s) (LRB):  REPAIR, SLAP LESION AND POSTERIOR LABRUM, SHOULDER, Labrum repair, possible biceps tenodesis (Right)    Anesthesia: General/Regional    Operative Findings: see op note    Estimated Blood Loss: Minimal         Specimens:   Specimen (24h ago, onward)      None              Discharge Note    OUTCOME: Patient tolerated treatment/procedure well without complication and is now ready for discharge.    DISPOSITION: Home or Self Care    FINAL DIAGNOSIS:  <principal problem not specified>    FOLLOWUP: In clinic    DISCHARGE INSTRUCTIONS:  No discharge procedures on file.

## 2022-11-01 NOTE — PATIENT INSTRUCTIONS
Time Line After Shoulder Surgery    Day 1 after surgery   Out of bed as much as possible  Keep your operative arm in the sling at all times, except to change clothes  Keep your dressing and the wounds dry at all times  Do not lift anything with your operative arm  Resume taking all home medications  Take your pain medication cocktail as prescribed    Day 3 after surgery   Remove bandages and cover with dry sterile gauze if still leaking fluid (this fluid is water from the surgery); if no fluid, cover with waterproof bandage and keep incisions dry    Day 10-14   Follow up with surgeon for suture or staple removal     DIET  Begin with clear liquids and light foods (jellos, soups, etc.).  Progress to your normal diet if you are not nauseated.    WOUND CARE  To avoid infection, keep surgical incisions clean and dry.  You must remove your operative dressing on post operative day 3 and then cover your wounds with a waterproof bandage or Bandaid.  With this bandage on, you may shower but you must keep the surgical wounds dry.  NO immersion of the wounds (i.e.bath).    MEDICATIONS  You received a nerve block prior to surgery.  This will wear off in the evening after surgery.  You were prescribed a multimodal pain medication protocol including Celebrex, Gabapentin, Tylenol, and Oxycodone.  Take these medications as prescribed on the bottle.   You were prescribed Aspirin- take this medication as prescribed to reduce your risk of blood clot after surgery.   Common side effects of the pain medication are nausea, drowsiness, and constipation - to decrease the side effects, take medication with food - if constipation occurs, consider taking an over-the-counter laxative.  If you are having problems with nausea and vomiting, contact the office to possibly have your medication changed.   Do not drive a car or operate machinery while taking the narcotic medication.    ACTIVITY  Keep your sling in place at all times until your  therapist or surgeon tells you its okay to take the sling off.   You are allowed to remove the sling for hygiene (shower) and changing clothes.  You must wear the sling when sleeping until your surgeon tells you otherwise.   You can remove your arm from the sling daily to straighten your elbow so it doesn't get stiff.   Do not engage in athletic activities over the first 7-10 days following surgery so you do not aggravate your shoulder.   Avoid long periods of sitting or long distance traveling for 2 weeks.  NO driving until instructed otherwise by physician.  May return to sedentary work ONLY or school 3-4 days after surgery, if pain is tolerable and you are able to work without using your operative arm.    ICE THERAPY  Begin immediately after surgery. Use ice pack on the shoulder every 2 hours for 20 minutes daily until your first post-operative visit - remember to keep a towel on your skin to avoid freezing the skin.      EXERCISE  No exercise or motion is to be done to the shoulder unless instructed to do so by your physician or therapist.   Formal physical therapy (PT) will begin after surgery.    EMERGENCIES  Contact Dr. Fletcher if any of the following are present:   Painful swelling or numbness   Unrelenting pain   Fever (over 101° - it is normal to have a low grade fever for the first day or two following surgery) or chills   Redness around incisions   Color change in wrist or hand   Continuous drainage or bleeding from incision (a small amount of drainage is expected)   Difficulty breathing   Excessive nausea/vomiting  **If you have an emergency after office hours or on the weekend, contact the same office number and you will be connected to our page service   **If you have an emergency that requires immediate attention, proceed to the nearest emergency room. .    Pain Medications  -You were given a nerve block in conjunction with your surgical procedure.  This nerve block will wear off after surgery.   When you begin feeling pain it is important for you to take your pain medication as directed.    You are prescribed the following medications; it is important to take the medications as prescribed.   -Acetaminophen 1000mg every 8 hours.  Start taking this medication as soon as you get home even if you don't have pain, then continue taking as directed.  -Celebrex 200mg twice daily.  Take your first dose as soon as you get home, then continue taking as directed.   -Gabapentin 300mg before bedtime.  Take your first dose the evening of surgery.    -Tramadol 50mg every 8 hours AS NEEDED ONLY. Take your first dose when you begin feeling knee pain when your nerve block wears off.  After this first dose, only take this medication if your pain is extreme.  We will not refill this medication after surgery because it is a Narcotic Pain medication.      -Aspirin 81mg daily.  Take this medication the night of surgery, then continue taking daily for 30 days to reduce risk of blood clot after surgery.

## 2022-11-01 NOTE — ANESTHESIA PROCEDURE NOTES
Peripheral Block    Patient location during procedure: pre-op   Block not for primary anesthetic.  Reason for block: at surgeon's request and post-op pain management   Post-op Pain Location: right pamella   Start time: 11/1/2022 6:50 AM  Timeout: 11/1/2022 6:50 AM   End time: 11/1/2022 6:55 AM    Staffing  Authorizing Provider: Lazaro Ballard MD  Performing Provider: Lazaro Ballard MD    Preanesthetic Checklist  Completed: patient identified, IV checked, site marked, risks and benefits discussed, surgical consent, monitors and equipment checked, pre-op evaluation and timeout performed  Peripheral Block  Patient position: sitting  Prep: ChloraPrep  Patient monitoring: heart rate, cardiac monitor, continuous pulse ox, continuous capnometry and frequent blood pressure checks  Block type: interscalene  Laterality: right  Injection technique: single shot  Needle  Needle type: Stimuplex   Needle gauge: 22 G  Needle length: 2 in  Needle localization: anatomical landmarks and ultrasound guidance   -ultrasound image captured on disc.  Assessment  Injection assessment: negative aspiration, negative parasthesia and local visualized surrounding nerve  Paresthesia pain: none  Heart rate change: no  Slow fractionated injection: yes    Medications:    Medications: bupivacaine (pf) (MARCAINE) injection 0.25% - Perineural   10 mL - 11/1/2022 6:54:00 AM    Additional Notes  VSS.  [facility] RN monitoring vitals throughout procedure.  Patient tolerated procedure well.

## 2022-11-01 NOTE — H&P
There are no changes in the H&P documented below. Plan to proceed with surgery as previously discussed.     Moreno Leonardo    [facility] Orthopedics and Sports Medicine  [facility]     Established Patient Office Visit  10/20/2022      Diagnosis:  Right shoulder SLAP tear, partial articular sided supraspinatus tendon tear     Procedure:   (12/20/2021) Right GH CSI        Subjective:   Long Brunner is a 22 y.o. male who presents for follow up treatment of the above mentioned diagnosis.  Overall the patient's symptoms are worsening.  The patient previously attempted physical therapy but was unable to continue due to pain.  He had advanced imaging which demonstrated the above pathology.  I previously discussed treatment and he now presents with his mom to discuss surgery.      The patient continues with painful clicking when raising his arm over 90degrees then another click when lowering his arm.  Click is felt anterior and posterior shoulder.  He is unable to throw a softball without pain and is limited in his recreational activities.  He has difficulty doing his job as  due to pain with overhead activities.       He denies neurologic complaints in the right arm.       Outside reports reviewed: historical medical records and office notes.          Past Medical History:   Diagnosis Date    Fractures               Patient Active Problem List   Diagnosis    Superior glenoid labrum lesion of right shoulder    Tear of right supraspinatus tendon         No past surgical history on file.      No current outpatient medications      Review of patient's allergies indicates:  No Known Allergies     Social History              Socioeconomic History    Marital status: Single   Tobacco Use    Smoking status: Never    Smokeless tobacco: Never            No family history on file.       Review of Systems   Constitutional: Negative for chills and fever.   HENT:  Negative for hearing loss.     Eyes:  Negative for blurred vision.   Cardiovascular:  Negative for chest pain.   Respiratory:  Negative for shortness of breath.    Gastrointestinal:  Negative for abdominal pain.   Neurological:  Negative for light-headedness.      Objective:   General     Nursing note and vitals reviewed.  Constitutional: He is oriented to person, place, and time. He appears well-developed and well-nourished. No distress.   HENT:   Head: Normocephalic and atraumatic.   Eyes: Pupils are equal, round, and reactive to light.   Cardiovascular:  Normal rate and regular rhythm.            Pulmonary/Chest: Effort normal and breath sounds normal. No respiratory distress.   Neurological: He is alert and oriented to person, place, and time.   Psychiatric: He has a normal mood and affect. His behavior is normal.            Right Shoulder Exam      Inspection/Observation   Swelling: absent  Bruising: absent  Atrophy: absent     Tenderness   The patient is tender to palpation of the biceps tendon.     Range of Motion   Active abduction:  170 normal   Forward Flexion:  170 normal   External Rotation 0 degrees:  50 normal   Internal rotation 0 degrees:  T8 normal      Tests & Signs   Apprehension: negative  Drop arm: negative  Crump test: negative  Impingement: negative  Belly Press: negative  Active Compression Test ([place]'s Sign): negative  Speed's Test: negative  Anterior Drawer Test: 0   Posterior Drawer Test: 0  Relocation 90 degrees: negative  Jerk Test: negative     Other   Sensation: normal     Comments:  Mady test- negative     Reproducible click when patient abducts arm from 80-90 degrees in the throwing motion which I am now able to create with passive shoulder motion     Left Shoulder Exam      Inspection/Observation   Swelling: absent  Bruising: absent  Atrophy: absent     Tenderness   The patient is experiencing no tenderness.      Range of Motion   Active abduction:  170 normal   Forward Flexion:  170 normal   External  Rotation 0 degrees:  50 normal   Internal rotation 0 degrees:  T8 normal      Tests & Signs   Drop arm: negative  Crump test: negative  Impingement: negative  Belly Press: negative  Active Compression test ([place]'s Sign): negative  Speed's Test: negative     Other   Sensation: normal      Comments:  Mady test- negative        Muscle Strength   Right Upper Extremity   Shoulder Abduction: 5/5   Shoulder Internal Rotation: 5/5   Shoulder External Rotation: 5/5   Biceps: 5/5   Left Upper Extremity  Shoulder Abduction: 5/5   Shoulder Internal Rotation: 5/5   Shoulder External Rotation: 5/5   Biceps: 5/5      Reflexes      Left Side  Biceps:  2+  Triceps:  2+  Brachioradialis:  2+  Left Mulligan's Sign:  Absent     Right Side   Biceps:  2+  Triceps:  2+  Brachioradialis:  2+  Right Mulligan's Sign:  absent     Vascular Exam      Right Pulses        Radial:                    2+        Left Pulses        Radial:                    2+        Imaging:  Radiographs of the right shoulder taken 12/20/2021 were personally reviewed from the [facility].  Multiple views of the shoulder are available today for review, including an AP, scapular Y, axillary view.  The glenohumeral joint demonstrates minimal degenerative changes.  The acromioclavicular joint demonstrates minimal degenerative changes.  The glenohumeral joint is concentrically reduced.  There is no acute fracture or dislocation.      MR-arthrogram of the right shoulder taken taken 1/14/2022 was personally reviewed from the [facility].  Multiple T1 and T2 sequences including axial, coronal, and sagittal views were reviewed.  No acute fractures or dislocations are noted in these images.  There is evidence of a SLAP tear.  There is a partial articular sided tear of the supraspinatus tendon with a small cyst noted at the insertion of the supraspinatus tendon. The remainder of the rotator cuff and articular surface is intact.       Assessment:       Long Brunner  is a 22 y.o. male seen in the office today. The primary encounter diagnosis was Superior glenoid labrum lesion of right shoulder, subsequent encounter. Diagnoses of Tear of right supraspinatus tendon and Preop examination were also pertinent to this visit.  Operative treatment with right shoulder surgery  is recommended at this time. I had a long discussion about the problem in his shoulder.  I discussed possible repair of a SLAP tear or a labrum repair or biceps tenodesis if indicated.  We discussed the possible decrease in range of motion of the shoulder, which would change his throwing arc.  He understands will need to do a return to throwing program after surgery.  We will evaluate the rotator cuff during the surgery as well and repair if needed. The natural history and expected course discussed with patient. Various treatment options were discussed, including their risks and benefits. All of the patient's questions were answered.  Plan:   Surgical treatment right shoulder arthroscopy glenoid labrum repair possible SLAP repair, possible biceps tenodesis, possible rotator cuff repair .  Surgical consent for surgery obtained during office visit.  Expected date of surgery: 11/1/2022.  Patient will NOT require preoperative medical evaluation prior to surgery. Preoperative labs/chest xray ordered.  Post operative physical therapy ordered.        Tj Fletcher IV, MD   of Clinical Orthopedics  Department of Orthopedic Surgery  [facility]  Office: [phone]  Website: [email or web address]     ---------------------------------------      Orders Placed This Encounter    X-Ray Chest 1 View Pre-OP    CBC Auto Differential    Basic Metabolic Panel    Protime-INR    APTT    Ambulatory referral/consult to Physical/Occupational Therapy    Case Request Operating Room: REPAIR, SLAP LESION, SHOULDER, Labrum repair, possible biceps tenodesis

## 2022-11-01 NOTE — PLAN OF CARE
Meets criteria for discharge. VSS, AAOx3. Tolerating po fluids without nausea. IV discontinued with tip intact.Discharge instructions given. Time allowed for questions. Verbalizes understanding. Home prescriptions received from Pharmacy. Discharged to home in good condition.

## 2022-11-01 NOTE — OP NOTE
Operative Report    JERICHO BRUNNER  : 2000  MRN: [id number]    Date of Procedure: 2022     Pre-op Diagnosis:    Right shoulder pain  SLAP Tear   Supraspinatus tendon tear, partial      Post-op Diagnosis:    Same as preoperative  Additional diagnoses:   Posterior Labral Tear    Procedure:   The following procedures were performed in the Right Shoulder:  Arthroscopic Shoulder Debridement - Extensive (06364)  Arthroscopic Posterior Labral Repair (73891)  Arthroscopic Superior Labrum (SLAP) Repair (30205)      Surgeon: Tj Fletcher IV, MD     Assisting Surgeon:    Moreno Leonardo MD     Anesthesiologist:  See Record    Anesthesia:    General Endotracheal Intubation  Regional Block - Interscalene    Estimated Blood Loss: 5cc         Specimens: none    Findings:   Synovium: Normal   Loose bodies:  None   Humeral head: Normal   Glenoid:  Normal   Superior labrum:  torn, detached from superior glenoid rim   Anterior labrum: Normal   Posterior labrum: Torn   Inferior pouch: Normal    Biceps tendon: Normal    Superior Rotator cuff: Partial articular-sided tear, bursal side intact   Subscapularis Rotator cuff: Normal   Other:  Bursal inflammation in subacromial space    Indications for surgery:   Jericho Brunner is a 22 y.o. male who presented with recurrent shoulder instability.  The patient's first dislocation was a sports-related injury many years prior to presentation to me for evaluation.  Initial treatment was with shoulder physical therapy but the patient continued to have instability events including shoulder dislocations and subluxations with small movements.  The shoulder dislocates while the patient is sleep and the patient is able to self-reduce.    History, examination, and imaging are consistent with a glenoid labral tear.  The risks and benefits of surgery were discussed, and the patient elected to proceed.     Description of procedure:   Prior to the surgical procedure, the patient was identified in the  preoperative holding area.  We had a thorough discussion about the risks and benefits of surgery including the expected post operative protocol.  The patient signed an informed consent and the operative extremity was marked.  A regional anesthesia block (interscalene) was performed by the Anesthesiology team prior to surgery.  Preoperative antibiotics were given prior to incision for infection prophylaxis. The patient was taken to the operating room and positioned on the table in the beach chair position.  All bony prominences were well padded.  An exam was performed, which showed normal passive range of motion.  The patient was subsequently prepped and draped in the usual orthopaedic sterile fashion.  The arm was fixed in a sterile mechanical arm kumar.  A surgical timeout was performed confirming the surgical site and procedure prior to proceeding.  Local anesthetic was injected into the proposed portals.      A standard posterosuperior portal was made, and the arthroscope was introduced.  An anterior interval cannula was established, and a probe was placed in the shoulder.  A thorough diagnostic arthroscopy was performed, and the above findings were seen.      (65538) The shaver was used to perform an extensive debridement of three or more areas. The debridement was performed on frayed posterior labral tissue and extensive synovitis in the subacromial space as well as bone debrided from edge of glenoid for re affixation of the glenoid labrum.  The articular sided rotator cuff tear was assessed and noted not to be full thickness, therefore it was debrided from the intraarticular perspective.  The remaining tissue was healthy appearing.  The probe was used to demonstrate that all remaining tissue was stable.    (81139) Attention was first turned toward the SLAP tear.  The tissue was torn from the biceps anchor toward the posterior glenoid at the 8:00 position.  Using a Neviser's portal approach, two Arthrex  knotless Fibertak anchors were placed posterior to the biceps tendon to reaffix the SLAP tear.  The suture was passed with a curved passer.  The tissue was reaffixed and stable.      (37269) Attention was now turned toward the posterior labral tear, which extended to the 8:00 position.  An additional posterior cannula at the accessory 7:00 position was established using a percutaneous technique.  The labral tissue was mobilized with a shaver and free from adhesions. Bone at the edge of the glenoid was debrided to provide a bleeding surface for healing of labral tissue.  Loose debris was evacuated with a shaver.  Two additional Arthrex Knotless Fibertak suture anchors were placed at regular intervals to repair the tissue back to the glenoid.  A curved suture passer was used to shuttle sutures around the labrum.  The sutures were then tensioned and the tissue was repaired to the glenoid.  The probe determined that the capsule labral complex was now stable.  With the labrum now repaired, the humeral head was observed to be concentric with respect to the glenoid.     Finally, the scope was transitioned to the subacromial space.  A minimal bursectomy was performed to evaluate the bursal surface of the rotator cuff, which was found to be intact, including at the site of the prior noted articular sided rotator cuff tear.  The shaver was used to perform a thorough bursectomy for visualization.     The arthroscopic fluid and instruments were removed.  The arthroscopic portals were closed with 3-0 Nylon and xeroform gauze.  A dry, sterile dressing followed by an UltraSling and a cryotherapy shoulder sleeve was then applied.  The sponge, needle, and instrument counts were correct at the end of the case.  The patient tolerated the procedure well, was extubated, and was taken to recovery in stable condition.     Post-Operative Management:  The patient will be nonweightbearing on the operative extremity and will wear the shoulder  immobilizer at all times.  After discharge, the patient will follow up in my office ([phone]) in 14 days after surgery.  The patient will be treated with DVT chemoprophylaxis and an opioid minimizing pain management regimen in the post operative period.       Complications: No     Condition: Good     Disposition: PACU - hemodynamically stable.     Attestation: I was present and scrubbed for the entire procedure.    Implants:   Arthrex knotless fibertak suture anchors x4

## 2022-11-01 NOTE — ANESTHESIA PROCEDURE NOTES
Intubation    Date/Time: 11/1/2022 7:21 AM  Performed by: Eddie Quezada CRNA  Authorized by: Lazaro Ballard MD     Intubation:     Induction:  Intravenous    Intubated:  Postinduction    Mask Ventilation:  Easy mask    Attempts:  1    Attempted By:  CRNA    Method of Intubation:  Video laryngoscopy    Blade:  Field 3    Laryngeal View Grade: Grade I - full view of cords      Difficult Airway Encountered?: No      Complications:  None    Airway Device:  Oral endotracheal tube    Airway Device Size:  7.5    Style/Cuff Inflation:  Cuffed (inflated to minimal occlusive pressure)    Inflation Amount (mL):  4    Tube secured:  21    Secured at:  The lips    Placement Verified By:  Capnometry    Complicating Factors:  None    Findings Post-Intubation:  BS equal bilateral and atraumatic/condition of teeth unchanged

## 2022-11-01 NOTE — ANESTHESIA PREPROCEDURE EVALUATION
11/01/2022  Long Brunner is a 22 y.o., male.      Pre-op Assessment    I have reviewed the Patient Summary Reports.     I have reviewed the Nursing Notes. I have reviewed the NPO Status.   I have reviewed the Medications.     Review of Systems  Anesthesia Hx:  No problems with previous Anesthesia    Cardiovascular:  Cardiovascular Normal     Pulmonary:  Pulmonary Normal    Hepatic/GI:  Hepatic/GI Normal    Endocrine:  Endocrine Normal        Physical Exam  General: Well nourished, Cooperative, Alert and Oriented    Airway:  Mallampati: II   Mouth Opening: Normal  TM Distance: Normal  Tongue: Normal  Neck ROM: Normal ROM    Chest/Lungs:  Normal Respiratory Rate, Clear to auscultation    Heart:  Rate: Normal  Rhythm: Regular Rhythm  Sounds: Normal        Anesthesia Plan  Type of Anesthesia, risks & benefits discussed:    Anesthesia Type: Gen ETT  Intra-op Monitoring Plan: Standard ASA Monitors  Post Op Pain Control Plan: peripheral nerve block and multimodal analgesia  Induction:  IV  Airway Plan: Direct  Informed Consent: Informed consent signed with the Patient and all parties understand the risks and agree with anesthesia plan.  All questions answered.   ASA Score: 2    Ready For Surgery From Anesthesia Perspective.     .

## 2022-11-01 NOTE — TRANSFER OF CARE
"Anesthesia Transfer of Care Note    Patient: Long Brunner    Procedure(s) Performed: Procedure(s) (LRB):  REPAIR, SLAP LESION, SHOULDER, Labrum repair, possible biceps tenodesis (Right)    Patient location: PACU    Anesthesia Type: general    Transport from OR: Transported from OR on 2-3 L/min O2 by NC with adequate spontaneous ventilation    Post pain: adequate analgesia    Post assessment: no apparent anesthetic complications and tolerated procedure well    Post vital signs: stable    Level of consciousness: sedated    Nausea/Vomiting: no nausea/vomiting    Complications: none    Transfer of care protocol was followed      Last vitals:   Visit Vitals  /80   Pulse 64   Temp 37 °C (98.6 °F) (Skin)   Resp 18   Ht 5' 6" (1.676 m)   Wt 77.1 kg (170 lb)   SpO2 100%   BMI 27.44 kg/m²     "

## 2022-11-03 ENCOUNTER — PATIENT MESSAGE (OUTPATIENT)
Dept: ORTHOPEDICS | Facility: CLINIC | Age: 22
End: 2022-11-03
Payer: MEDICAID

## 2022-11-03 ENCOUNTER — TELEPHONE (OUTPATIENT)
Dept: ORTHOPEDICS | Facility: CLINIC | Age: 22
End: 2022-11-03
Payer: MEDICAID

## 2022-11-03 NOTE — TELEPHONE ENCOUNTER
----- Message from Layla Orellana sent at 11/3/2022 10:06 AM CDT -----  Type:  Needs Medical Advice    Who Called: pt mother  Symptoms (please be specific): pt is having some numbness to his right arm and they would like to get a return call to discuss     Would the patient rather a call back or a response via [facility]? call  Best Call Back Number:[phone]   Additional Information:

## 2022-11-03 NOTE — TELEPHONE ENCOUNTER
I spoke with Mrs Brunner in reference to Long's arm Post-op numbness and tingling. Her concerns has been forwarded to Dr Fletcher.

## 2022-11-09 ENCOUNTER — CLINICAL SUPPORT (OUTPATIENT)
Dept: REHABILITATION | Facility: HOSPITAL | Age: 22
End: 2022-11-09
Attending: ORTHOPAEDIC SURGERY
Payer: MEDICAID

## 2022-11-09 DIAGNOSIS — M25.611 DECREASED RIGHT SHOULDER RANGE OF MOTION: ICD-10-CM

## 2022-11-09 DIAGNOSIS — R29.898 WEAKNESS OF SHOULDER: ICD-10-CM

## 2022-11-09 DIAGNOSIS — S43.431D SUPERIOR GLENOID LABRUM LESION OF RIGHT SHOULDER, SUBSEQUENT ENCOUNTER: ICD-10-CM

## 2022-11-09 PROCEDURE — 97161 PT EVAL LOW COMPLEX 20 MIN: CPT | Mod: PO

## 2022-11-09 NOTE — PROGRESS NOTES
[facility]  Physical Therapy Initial Evaluation    Name: Long Brunner  Clinic Number: [id number]    Therapy Diagnosis: No diagnosis found.  Physician: Tj Fletcher IV, MD    Physician Orders: PT eval and treat  Medical Diagnosis:      S43.431D (ICD-10-CM) - Superior glenoid labrum lesion of right shoulder, subsequent encounter     Evaluation Date: ***  Authorization Period Expiration: ***  Plan of Care Certification Period: ***  Progress Note Due: ***    Visit # / Visits authorized: 1/ 1   FOTO: 1/ 3   PTA Visit #: 0/5     Time In: ***  Time Out: ***  Total Billable Time: *** minutes    Precautions: Standard s/p SLAP protocol    Subjective   Date of onset: s/p SLAP 11/122  History of current condition - Long reports:   Per MD report on 11/1/22:  Indications for surgery:   Long Brunner is a 22 y.o. male who presented with recurrent shoulder instability.  The patient's first dislocation was a sports-related injury many years prior to presentation to me for evaluation.  Initial treatment was with shoulder physical therapy but the patient continued to have instability events including shoulder dislocations and subluxations with small movements.  The shoulder dislocates while the patient is sleep and the patient is able to self-reduce.    History, examination, and imaging are consistent with a glenoid labral tear.  The risks and benefits of surgery were discussed, and the patient elected to proceed.    Per 10/20/22 ortho visit:  Long Brunner is a 22 y.o. male who presents for follow up treatment of the above mentioned diagnosis.  Overall the patient's symptoms are worsening.  The patient previously attempted physical therapy but was unable to continue due to pain.  He had advanced imaging which demonstrated the above pathology.  I previously discussed treatment and he now presents with his mom to discuss surgery.      The patient continues with painful clicking when raising his arm over 90degrees  "then another click when lowering his arm.  Click is felt anterior and posterior shoulder.  He is unable to throw a softball without pain and is limited in his recreational activities.  He has difficulty doing his job as  due to pain with overhead activities.          Medical History:   Past Medical History:   Diagnosis Date    Fractures        Surgical History:   Long Brunner  has a past surgical history that includes Repair of superior labral anterior-to-posterior (SLAP) tear of shoulder (Right, 11/1/2022); Repair of labrum of hip (11/1/2022); and Arthroscopic debridement of shoulder (11/1/2022).    Medications:   Long has a current medication list which includes the following prescription(s): acetaminophen, aspirin, celecoxib, and gabapentin.    Allergies:   Review of patient's allergies indicates:  No Known Allergies     Imaging, {Mri/ctscan/bone scan:58329}: ***    Prior Therapy: ***  Social History: *** {LIVES WITH:67462}  Occupation: ***  Prior Level of Function: ***  Current Level of Function: ***    Pain:  Current {0-10:36219::"0"}/10, worst {0-10:20507::"0"}/10, best {0-10:20507::"0"}/10   Location: {RIGHT LEFT BILATERAL:48946} {LOCATION ON BODY:34490}  Description: {Pain Description:47181}  Aggravating Factors: {Causes; Pain:72010}  Easing Factors: {Pain (activities that relieve):56724}    Pts goals: ***    Objective     Observation:/Posture {AMB OT NEURO POSTURAL EXAM:79604}      Cervical ROM: (measured in degrees) ***   Degrees Quality   Flexion ***    Right SB ***    Left SB ***    Right rotation ***    Left rotation ***      Shoulder Active/ Passive ROM: (measured in degrees) ***  Shoulder Right UE Left UE   Flexion  {AMB PT VESTIBULAR ROM:72381} {AMB PT VESTIBULAR ROM:15752}   Abduction {AMB PT VESTIBULAR ROM:96954} {AMB PT VESTIBULAR ROM:30336}   ER {AMB PT VESTIBULAR ROM:27389} {AMB PT VESTIBULAR ROM:53490}   IR {AMB PT VESTIBULAR ROM:32493} {AMB PT VESTIBULAR ROM:13189}     Sensation: " "Dermatomes: {INTACT:72938} ***    Reflexes: ***    Strength: (measured in neutral spine, gradin-5 out of 5) ***  Cervical MMT   Flexion {AMB PT VESTIBULAR STRENGTH:04512}   Extension {AMB PT VESTIBULAR STRENGTH:86134}   Right Side Bend {AMB PT VESTIBULAR STRENGTH:58205}   Left Side Bend {AMB PT VESTIBULAR STRENGTH:85424}   Upper trap. {AMB PT VESTIBULAR STRENGTH:30660}     Upper Extremity Strength: (grading 1-5 out of 5) ***     Right UE Left UE   Shoulder Flexion: {AMB PT VESTIBULAR STRENGTH:10464} {AMB PT VESTIBULAR STRENGTH:88295}   Shoulder Abduction: {AMB PT VESTIBULAR STRENGTH:78866} {AMB PT VESTIBULAR STRENGTH:02343}   Shoulder ER: {AMB PT VESTIBULAR STRENGTH:76017} {AMB PT VESTIBULAR STRENGTH:22775}   Shoulder IR: {AMB PT VESTIBULAR STRENGTH:72644} {AMB PT VESTIBULAR STRENGTH:67063}        Elbow Flexion: {AMB PT VESTIBULAR STRENGTH:34649} {AMB PT VESTIBULAR STRENGTH:66817}   Elbow Extension: {AMB PT VESTIBULAR STRENGTH:90056} {AMB PT VESTIBULAR STRENGTH:66259}          Cervical Spine Special Tests: ((+): positive; (-): negative) ***  Compression ***   Distraction ***   Magdaleno test/TOS ***   Lateral Flexion Alar Ligament ***   First Rib  Speeds test  Empty can ***  ***  ***          CMS Impairment/Limitation/Restriction for FOTO *** Survey    Therapist reviewed FOTO scores for Long Brunner on 2022.   FOTO documents entered into EPIC - see Media section.    Limitation Score: ***%  Category: {Blank single:79478::"Other","Self Care","Body Position","Carrying","Mobility"}    Current : {G Codes:20500}  Goal: {G Codes:77504}  Discharge: {G Codes:68707}         TREATMENT   Treatment Time In: ***  Treatment Time Out: ***  Total Treatment time separate from Evaluation: *** minutes    Long received therapeutic exercises to develop {AMB PT PROGRESS OBJECTIVE:24673} for *** minutes including:  ***    Long received the following manual therapy techniques: {AMB PT PROGRESS MANUAL THERAPY:10308} were applied to the: *** " "for *** minutes, including:  ***    Long received hot pack for *** minutes to ***.    Long received cold pack for *** minutes to ***.    Home Exercises and Patient Education Provided    Education provided:   - HEP, pain management    Written Home Exercises Provided: {Blank single:74889::"yes","Patient instructed to cont prior HEP"}.  Exercises were reviewed and Long was able to demonstrate them prior to the end of the session.  Long demonstrated {Desc; good/fair/poor:09731} understanding of the education provided.     See EMR under per handout for exercises provided {Blank single:84151::"11/9/2022","prior visit"}.    Assessment   Long is a 22 y.o. male referred to outpatient Physical Therapy with a medical diagnosis of      S43.431D (ICD-10-CM) - Superior glenoid labrum lesion of right shoulder, subsequent encounter   . Pt presents with ***    Pt prognosis is {REHAB PROGNOSIS OHS:28963}.   Pt will benefit from skilled outpatient Physical Therapy to address the deficits stated above and in the chart below, provide pt/family education, and to maximize pt's level of independence.     Plan of care discussed with patient: Yes  Pt's spiritual, cultural and educational needs considered and patient is agreeable to the plan of care and goals as stated below:     Anticipated Barriers for therapy: ***    Medical Necessity is demonstrated by the following  History  Co-morbidities and personal factors that may impact the plan of care Co-morbidities:   {Co-morbidities:99956}    Personal Factors:   {Personal Factors:61946}     {Desc; low/moderate/high:621129}   Examination  Body Structures and Functions, activity limitations and participation restrictions that may impact the plan of care Body Regions:   {Body Regions:20575}    Body Systems:    {Body Systems:35209}    Participation Restrictions:   ***    Activity limitations:   Learning and applying knowledge  {Learning and applying knowledge:36721}    General Tasks and " "Commands  {Gen tasks and commands:17681}    Communication  {Communication:65397}    Mobility  {Mobility:10673}    Self care  {Self Care:25402}    Domestic Life  {Domestic Life:78296}    Interactions/Relationships  {Interactions/Relationships:07814}    Life Areas  {Life Areas:82211}    Community and Social Life  {Community/Social Life:11222}         {Desc; low/moderate/high:213032}   Clinical Presentation {Clinical Presentation :31597} {Desc; low/moderate/high:484339}   Decision Making/ Complexity Score: {Desc; low/moderate/high:643102}     Goals:  Short Term Goals: 3 weeks   Pt to be Independent with HEP for increased strength, endurance and functional mobility.  Pt to have decreased pain ***/10 for increased functional mobility and tolerance.  Pt to increase AROM to *** degrees for increased functional mobility.      Long Term Goals: *** weeks   Pt to be Independent with pain management strategies and verbalize 2 for increased strength, endurance and functional mobility.  Pt to have decreased pain ***/10 for increased functional mobility and tolerance.  Pt to increase AROM to *** degrees for increased functional mobility.  Pt to increase activity tolerance to *** hrs for without increased pain for increased overall functional activity.    Plan   Plan of care Certification: 11/9/2022 to ***.    Outpatient Physical Therapy {NUMBERS 1-5:12941} times weekly for {0-10:20507::"0"} weeks to include the following interventions: {TX PLAN:40061}.     Radha Singer, PT     "

## 2022-11-09 NOTE — PLAN OF CARE
[facility]   Physical Therapy Initial Evaluation     Date: 11/9/2022   Name: Long Brunner  Clinic Number: [id number]    Therapy Diagnosis:   Encounter Diagnoses   Name Primary?    Superior glenoid labrum lesion of right shoulder, subsequent encounter     Decreased right shoulder range of motion     Weakness of shoulder      Physician: Tj Fletcher IV, MD    Physician Orders: PT Eval and Treat   Medical Diagnosis from Referral: Superior glenoid labrum lesion of right shoulder, subsequent encounter [S43.431D]  Evaluation Date: 11/9/2022  Authorization Period Expiration: 10/20/23  Plan of Care Expiration: 2/28/23  Progress Note Due: 12/9/22  Visit # / Visits authorized: 1/ 1   FOTO: 1/3    PTA Visit #: 0/5    Precautions: Standard, POST OP     Time In: 9:55  Time Out: 10:30  Total Appointment Time (timed & untimed codes): 35 minutes      ROM: start PROM at 3 weeks, limit flexion 90', IR 45', abdxn 90';  at 6 weeks begin active/active assisted ROM passive ROM to tolerance, goal full ER, 135' flexion, 120' abduction; at 12 weeks full ROM   Immobilizer: on at all times except hygiene and therapy for 6 weeks then d/c after 6 weeks   Exercises: 0-3 wks elbow/wrist  only; 3-6 wks begin PROM activities with posterior capsule mobilizations, closed chain scapula; 8 weeks begin deltoid/rotator cuff isometrics; 12 weeks, work on glenohumeral stabilization, cycling/running at 12 weeks; 4-5 months scapular stabilization, eccentric strengthening, plyometrics, endurance.        Patient is 1 week post-op on 11/8/22 (surgery date 11/1/22)      SUBJECTIVE     Date of onset: surgery 11/1/22    History of current condition - Long reports: had RIGHT SLAP repair with supraspinatus debridement on 11/1/22. He is not having any pain right now. He is still taking the pain medication. He had some tingling in the arm after surgery but this has gone away. He does have sling donned properly today. He is not having  any trouble sleeping. He believes the injury initially happened when pitching and playing centerfield in baseball in high school.     Falls: no    Imaging, none:     Prior Therapy: no  Social History:  lives with their girlfriend   Occupation: working -   Prior Level of Function: independent  Current Level of Function: difficulty with overheard movement; unable to use right arm currently.     Pain:  Current 0/10, worst 4/10, best 0/10   Location: right SLAP repair      Description: not having pain as he has been consistent with pain medication  Aggravating Factors: none right now  Easing Factors: pain medication    Patients goals: restore normal function of the arm, return to work, softball     Medical History:   Past Medical History:   Diagnosis Date    Fractures        Surgical History:   Long Brunner  has a past surgical history that includes Repair of superior labral anterior-to-posterior (SLAP) tear of shoulder (Right, 11/1/2022); Repair of labrum of hip (11/1/2022); and Arthroscopic debridement of shoulder (11/1/2022).    Medications:   Long has a current medication list which includes the following prescription(s): acetaminophen, aspirin, celecoxib, and gabapentin.    Allergies:   Review of patient's allergies indicates:  No Known Allergies       OBJECTIVE     Observation: sling donned properly.     Posture: forward head, rounded shoulders      Passive Range of Motion:   Shoulder Right Left   Flexion 90 NT   Abduction 45 NT   ER at 0 20 NT   ER at 90    IR 30 NT      Active Range of Motion:   Shoulder Right Left   Flexion    Abduction    ER at 0 NT NT   ER at 90 NT 78   IR NT To stomach at 0   Reach behind head NT yes   Reach behind back  NT yes     Strength:  Shoulder Right Left   Flexion NT 5/5   Abduction NT 4+/5   ER NT 4+/5   IR NT 4+/5   Serratus Anterior NT NT   Middle Trap NT NT   Low Trap NT NT     Joint Mobility: NT today    Palpation: no tenderness to  "palpation    Sensation: NT      Limitation/Restriction for FOTO shoulder  Survey    Therapist reviewed FOTO scores for Long Brunner on 2022.   FOTO documents entered into [employer] - see Media section.    Limitation Score: 55%         TREATMENT     Total Treatment time (time-based codes) separate from Evaluation: 15 minutes      Long received the treatments listed below:      therapeutic exercises to develop strength, endurance, ROM, flexibility, and posture for 5 minutes includin way GH isometrics in sling 10x5" R  Wrist flexion/extension x1 min  Elbow flexion/extension x1 min  Scap squeezes 30x3"       manual therapy techniques: Joint mobilizations and Soft tissue Mobilization were applied for 10 minutes, including:  PROM flexion to 90, abduction to 45, external rotation to 20, and internal rotation to 30    neuromuscular re-education activities to improve: Balance, Coordination, Proprioception, and Posture for 0 minutes. The following activities were included:          PATIENT EDUCATION AND HOME EXERCISES     Education provided:   - eduction on condition  -home exercise program    Written Home Exercises Provided: yes. Exercises were reviewed and Long was able to demonstrate them prior to the end of the session.  Long demonstrated good  understanding of the education provided. See EMR under Patient Instructions for exercises provided during therapy sessions.    ASSESSMENT     Long is a 22 y.o. male referred to outpatient Physical Therapy with a medical diagnosis of Superior glenoid labrum lesion of right shoulder, subsequent encounter [S45.143U]. Patient presents with post-op week 1 SLAP repair with supraspinatus debridement. He is pain free today and able to achieve range of motion per protocol without restriction. He will benefit from skilled PT to restore range of motion, strength, and function for return to work and sport (softball).     Patient prognosis is Good.   Patient will benefit from skilled " outpatient Physical Therapy to address the deficits stated above and in the chart below, provide patient /family education, and to maximize patientt's level of independence.     Plan of care discussed with patient: Yes  Patient's spiritual, cultural and educational needs considered and patient is agreeable to the plan of care and goals as stated below:     Anticipated Barriers for therapy: scheduling    Medical Necessity is demonstrated by the following  History  Co-morbidities and personal factors that may impact the plan of care Co-morbidities:   Past Medical History:   Diagnosis Date    Fractures        Personal Factors:   no deficits     low   Examination  Body Structures and Functions, activity limitations and participation restrictions that may impact the plan of care Body Regions:   upper extremities    Body Systems:    gross symmetry  ROM  strength  gross coordinated movement    Participation Restrictions:   softball    Activity limitations:   Learning and applying knowledge  no deficits    General Tasks and Commands  no deficits    Communication  no deficits    Mobility  lifting and carrying objects    Self care  washing oneself (bathing, drying, washing hands)  caring for body parts (brushing teeth, shaving, grooming)  dressing    Domestic Life  doing house work (cleaning house, washing dishes, laundry)    Interactions/Relationships  no deficits    Life Areas  employment    Community and Social Life  community life  recreation and leisure         low   Clinical Presentation stable and uncomplicated low   Decision Making/ Complexity Score: low     GOALS: Short Term Goals:  5 weeks  1.Report decreased R shoulder pain < / =  0/10  to increase tolerance for daily activity  2. Increase PROM to within protocol guidelines   3. Increased strength by 1/3 MMT grade in R UE to increase tolerance for ADL and work activities.  4. Pt to tolerate HEP to improve ROM and independence with ADL's    Long Term Goals: 15  weeks  1.Pt to like 20 lbs without increase in pain for return to work  2.Increase AROM to equal to left   3.Increase strength to >/= 4/5 in R UE to increase tolerance for ADL and work activities.  4. Pt goal: Pt to perform weighted overhead activities without increase in pain to be able to return to softball  5. Pt will have improved gcode of CJ (20-40% limited) on FOTO shoulder in order to demonstrate true functional improvement.     PLAN   Plan of care Certification: 11/9/2022 to 2/28/23.    Outpatient Physical Therapy 2 times weekly for 10 weeks to include the following interventions: Cervical/Lumbar Traction, Electrical Stimulation  , Gait Training, Manual Therapy, Moist Heat/ Ice, Neuromuscular Re-ed, Patient Education, Therapeutic Activities, Therapeutic Exercise, and Ultrasound.     Alicia Ly, PT      I CERTIFY THE NEED FOR THESE SERVICES FURNISHED UNDER THIS PLAN OF TREATMENT AND WHILE UNDER MY CARE   Physician's comments:     Physician's Signature: ___________________________________________________

## 2022-11-14 ENCOUNTER — CLINICAL SUPPORT (OUTPATIENT)
Dept: REHABILITATION | Facility: HOSPITAL | Age: 22
End: 2022-11-14
Attending: ORTHOPAEDIC SURGERY
Payer: MEDICAID

## 2022-11-14 DIAGNOSIS — M25.611 DECREASED RIGHT SHOULDER RANGE OF MOTION: Primary | ICD-10-CM

## 2022-11-14 DIAGNOSIS — S43.431D SUPERIOR GLENOID LABRUM LESION OF RIGHT SHOULDER, SUBSEQUENT ENCOUNTER: ICD-10-CM

## 2022-11-14 DIAGNOSIS — R29.898 WEAKNESS OF SHOULDER: ICD-10-CM

## 2022-11-14 PROCEDURE — 97110 THERAPEUTIC EXERCISES: CPT | Mod: PO

## 2022-11-14 NOTE — PROGRESS NOTES
[facility]   Physical Therapy Treatment Note     Name: Long Brunner  Clinic Number: [id number]    Therapy Diagnosis:   Encounter Diagnoses   Name Primary?    Decreased right shoulder range of motion Yes    Superior glenoid labrum lesion of right shoulder, subsequent encounter     Weakness of shoulder      Physician: Tj Fletcher IV, MD    Visit Date: 11/14/2022    Physician Orders: PT Eval and Treat   Medical Diagnosis from Referral: Superior glenoid labrum lesion of right shoulder, subsequent encounter [S43.431D]  Evaluation Date: 11/9/2022  Authorization Period Expiration: 2/12/23  Plan of Care Expiration: 2/28/23  Progress Note Due: 12/9/22  Visit # / Visits authorized: 1/20  FOTO: 1/3       PTA Visit #: 0/5     Precautions: Standard, POST OP     Time In: 10:45  Time Out: 11:01  Total Billable Time: 16 minutes    ROM: start PROM at 3 weeks, limit flexion 90', IR 45', abdxn 90';  at 6 weeks begin active/active assisted ROM passive ROM to tolerance, goal full ER, 135' flexion, 120' abduction; at 12 weeks full ROM   Immobilizer: on at all times except hygiene and therapy for 6 weeks then d/c after 6 weeks   Exercises: 0-3 wks elbow/wrist  only; 3-6 wks begin PROM activities with posterior capsule mobilizations, closed chain scapula; 8 weeks begin deltoid/rotator cuff isometrics; 12 weeks, work on glenohumeral stabilization, cycling/running at 12 weeks; 4-5 months scapular stabilization, eccentric strengthening, plyometrics, endurance.          Patient is 2 week post-op on 11/15/22 (surgery date 11/1/22)      SUBJECTIVE     Pt reports: no pain; no issues with home program.  He was compliant with home exercise program.  Response to previous treatment: IE performed   Functional change: ongoing    Pain: 0/10  Location: right SLAP       OBJECTIVE     Objective measures taken at progress report unless specified otherwise.     TREATMENT       Long received the treatments listed below:      "  therapeutic exercises to develop strength, endurance, ROM, flexibility, and posture for 16 minutes includin way GH isometrics in sling 10x5" R  Wrist flexion/extension x1 min  Elbow flexion/extension x1 min  Scap squeezes 30x3"         manual therapy techniques: Joint mobilizations and Soft tissue Mobilization were applied for 0 minutes, including:  PROM flexion to 90, abduction to 45, external rotation to 20, and internal rotation to 30     neuromuscular re-education activities to improve: Balance, Coordination, Proprioception, and Posture for 0 minutes. The following activities were included:       PATIENT EDUCATION AND HOME EXERCISES     Home Exercises Provided and Patient Education Provided     Education provided:   - eduction on condition  -home exercise program     Written Home Exercises Provided: yes. Exercises were reviewed and Long was able to demonstrate them prior to the end of the session.  Long demonstrated good  understanding of the education provided. See EMR under Patient Instructions for exercises provided during therapy sessions.    ASSESSMENT     Long is doing well today and reports no pain and compliance with home program. Discussed that his protocol states no PROM until 3 weeks and therefore no more than one appointment per week is needed. May begin contralateral training for ipsilateral benefit at next visit.     Long Is progressing towards his goals.   Pt prognosis is Good.     Pt will continue to benefit from skilled outpatient physical therapy to address the deficits listed in the problem list box on initial evaluation, provide pt/family education and to maximize pt's level of independence in the home and community environment.     Pt's spiritual, cultural and educational needs considered and pt agreeable to plan of care and goals.     Anticipated barriers to physical therapy: scheduling    Goals:   Short Term Goals:  5 weeks  1.Report decreased R shoulder pain < / =  0/10  to " increase tolerance for daily activity  2. Increase PROM to within protocol guidelines   3. Increased strength by 1/3 MMT grade in R UE to increase tolerance for ADL and work activities.  4. Pt to tolerate HEP to improve ROM and independence with ADL's     Long Term Goals: 15 weeks  1.Pt to like 20 lbs without increase in pain for return to work  2.Increase AROM to equal to left   3.Increase strength to >/= 4/5 in R UE to increase tolerance for ADL and work activities.  4. Pt goal: Pt to perform weighted overhead activities without increase in pain to be able to return to softball  5. Pt will have improved gcode of CJ (20-40% limited) on FOTO shoulder in order to demonstrate true functional improvement.     PLAN     Plan of care Certification: 11/9/2022 to 2/28/23.    Cont POC    Alicia Ly, PT

## 2022-11-17 ENCOUNTER — OFFICE VISIT (OUTPATIENT)
Dept: ORTHOPEDICS | Facility: CLINIC | Age: 22
End: 2022-11-17
Payer: MEDICAID

## 2022-11-17 ENCOUNTER — HOSPITAL ENCOUNTER (OUTPATIENT)
Dept: RADIOLOGY | Facility: HOSPITAL | Age: 22
Discharge: HOME | End: 2022-11-17
Attending: PHYSICIAN ASSISTANT
Payer: MEDICAID

## 2022-11-17 VITALS
HEIGHT: 66 IN | SYSTOLIC BLOOD PRESSURE: 117 MMHG | HEART RATE: 83 BPM | WEIGHT: 172.38 LBS | DIASTOLIC BLOOD PRESSURE: 67 MMHG | BODY MASS INDEX: 27.7 KG/M2

## 2022-11-17 DIAGNOSIS — G89.18 POST-OP PAIN: ICD-10-CM

## 2022-11-17 DIAGNOSIS — S43.431A TEAR OF RIGHT GLENOID LABRUM, INITIAL ENCOUNTER: ICD-10-CM

## 2022-11-17 DIAGNOSIS — S43.431D SUPERIOR GLENOID LABRUM LESION OF RIGHT SHOULDER, SUBSEQUENT ENCOUNTER: Primary | ICD-10-CM

## 2022-11-17 DIAGNOSIS — M75.101 TEAR OF RIGHT SUPRASPINATUS TENDON: ICD-10-CM

## 2022-11-17 PROCEDURE — 99024 POSTOP FOLLOW-UP VISIT: CPT | Mod: ,,, | Performed by: ORTHOPAEDIC SURGERY

## 2022-11-17 PROCEDURE — 73030 XR SHOULDER COMPLETE 2 OR MORE VIEWS RIGHT: ICD-10-PCS | Mod: 26,RT,, | Performed by: RADIOLOGY

## 2022-11-17 PROCEDURE — 3008F PR BODY MASS INDEX (BMI) DOCUMENTED: ICD-10-PCS | Mod: CPTII,,, | Performed by: ORTHOPAEDIC SURGERY

## 2022-11-17 PROCEDURE — 73030 X-RAY EXAM OF SHOULDER: CPT | Mod: 26,RT,, | Performed by: RADIOLOGY

## 2022-11-17 PROCEDURE — 99999 PR PBB SHADOW E&M-EST. PATIENT-LVL III: ICD-10-PCS | Mod: PBBFAC,,, | Performed by: ORTHOPAEDIC SURGERY

## 2022-11-17 PROCEDURE — 99213 OFFICE O/P EST LOW 20 MIN: CPT | Mod: PBBFAC,PN | Performed by: ORTHOPAEDIC SURGERY

## 2022-11-17 PROCEDURE — 73030 X-RAY EXAM OF SHOULDER: CPT | Mod: TC,FY,RT

## 2022-11-17 PROCEDURE — 3074F SYST BP LT 130 MM HG: CPT | Mod: CPTII,,, | Performed by: ORTHOPAEDIC SURGERY

## 2022-11-17 PROCEDURE — 3078F PR MOST RECENT DIASTOLIC BLOOD PRESSURE < 80 MM HG: ICD-10-PCS | Mod: CPTII,,, | Performed by: ORTHOPAEDIC SURGERY

## 2022-11-17 PROCEDURE — 3008F BODY MASS INDEX DOCD: CPT | Mod: CPTII,,, | Performed by: ORTHOPAEDIC SURGERY

## 2022-11-17 PROCEDURE — 1159F MED LIST DOCD IN RCRD: CPT | Mod: CPTII,,, | Performed by: ORTHOPAEDIC SURGERY

## 2022-11-17 PROCEDURE — 3074F PR MOST RECENT SYSTOLIC BLOOD PRESSURE < 130 MM HG: ICD-10-PCS | Mod: CPTII,,, | Performed by: ORTHOPAEDIC SURGERY

## 2022-11-17 PROCEDURE — 99024 PR POST-OP FOLLOW-UP VISIT: ICD-10-PCS | Mod: ,,, | Performed by: ORTHOPAEDIC SURGERY

## 2022-11-17 PROCEDURE — 99999 PR PBB SHADOW E&M-EST. PATIENT-LVL III: CPT | Mod: PBBFAC,,, | Performed by: ORTHOPAEDIC SURGERY

## 2022-11-17 PROCEDURE — 1159F PR MEDICATION LIST DOCUMENTED IN MEDICAL RECORD: ICD-10-PCS | Mod: CPTII,,, | Performed by: ORTHOPAEDIC SURGERY

## 2022-11-17 PROCEDURE — 3078F DIAST BP <80 MM HG: CPT | Mod: CPTII,,, | Performed by: ORTHOPAEDIC SURGERY

## 2022-11-17 NOTE — PROGRESS NOTES
[facility]  [facility]    Shoulder Post-op Visit  11/17/2022     Pre-op Diagnosis:    Right shoulder pain  SLAP Tear              Supraspinatus tendon tear, partial        Posterior Labral Tear     Procedure: 11/1/2022  The following procedures were performed in the Right Shoulder:  Arthroscopic Shoulder Debridement - Extensive    Arthroscopic Posterior Labral Repair   Arthroscopic Superior Labrum (SLAP) Repair      Subjective:      Long Brunner is a 22 y.o. male who is now 2 weeks status post right shoulder surgery. The patient is not having any pain. The patient denies fever, wound drainage, increasing redness, pus, increasing pain, increasing swelling. Post op problems reported: none.    In therapy, wearing sling as instructed, no issues noted.      Objective:      Ortho/SPM Exam  General: alert, appears stated age, and cooperative   Sutures: Sutures in place and will be removed today.  Incision: healing well, no significant drainage, no dehiscence, no significant erythema  Tenderness: none  Forward Flexion: passive 90 degrees  External Rotation: passive 20  degrees  Elbow/Wrist/Hand: Full ROM  Neuro: Intact to light touch Ax/R/U/M  Vascular: CR<2s. Palpable radial pulse      Imaging:  Radiographs of the right shoulder taken 11/17/2022 were personally reviewed from the [facility] EMR.  Multiple views of the shoulder are available today for review, including an AP, scapular Y, axillary view.  There is no acute fracture or dislocation.         Assessment:       The patient is status post right shoulder surgery. The primary encounter diagnosis was Superior glenoid labrum lesion of right shoulder, subsequent encounter. Diagnoses of Tear of right supraspinatus tendon and Tear of right glenoid labrum, initial encounter were also pertinent to this visit.  Doing well postoperatively. Continuation of post-op rehab course is recommended at this time. All of the  patient's questions were answered.    No changes to post op plan, doing well.      Plan:      Tylenol 650mg TID PRN for pain.  Continue physical therapy.  Nonweightbearing on the operative extremity until 6 weeks after surgery.  Follow up at 6 weeks after surgery.  Continue sling right arm        Tj Fletcher IV, MD   of Clinical Orthopedics  Department of Orthopedic Surgery  [facility]  Office: [phone]  Website: [email or web address]        No orders of the defined types were placed in this encounter.

## 2022-11-21 ENCOUNTER — CLINICAL SUPPORT (OUTPATIENT)
Dept: REHABILITATION | Facility: HOSPITAL | Age: 22
End: 2022-11-21
Attending: ORTHOPAEDIC SURGERY
Payer: MEDICAID

## 2022-11-21 DIAGNOSIS — S43.431D SUPERIOR GLENOID LABRUM LESION OF RIGHT SHOULDER, SUBSEQUENT ENCOUNTER: ICD-10-CM

## 2022-11-21 DIAGNOSIS — R29.898 WEAKNESS OF SHOULDER: ICD-10-CM

## 2022-11-21 DIAGNOSIS — M25.611 DECREASED RIGHT SHOULDER RANGE OF MOTION: Primary | ICD-10-CM

## 2022-11-21 PROCEDURE — 97110 THERAPEUTIC EXERCISES: CPT | Mod: PO

## 2022-11-21 NOTE — PROGRESS NOTES
[facility]   Physical Therapy Treatment Note     Name: Long Brunner  Clinic Number: [id number]    Therapy Diagnosis:   Encounter Diagnoses   Name Primary?    Decreased right shoulder range of motion Yes    Superior glenoid labrum lesion of right shoulder, subsequent encounter     Weakness of shoulder        Physician: Tj Fletcher IV, MD    Visit Date: 11/21/2022    Physician Orders: PT Eval and Treat   Medical Diagnosis from Referral: Superior glenoid labrum lesion of right shoulder, subsequent encounter [S43.431D]  Evaluation Date: 11/9/2022  Authorization Period Expiration: 2/12/23  Plan of Care Expiration: 2/28/23  Progress Note Due: 12/9/22  Visit # / Visits authorized: 2/20  FOTO: 1/3       PTA Visit #: 0/5     Precautions: Standard, POST OP     Time In: 10:05  Time Out: 10:28  Total Billable Time: 23 minutes    ROM: start PROM at 3 weeks, limit flexion 90', IR 45', abdxn 90';    at 6 weeks begin active/active assisted ROM passive ROM to tolerance, goal full ER, 135' flexion, 120' abduction;   at 12 weeks full ROM   Immobilizer: on at all times except hygiene and therapy for 6 weeks then d/c after 6 weeks   Exercises: 0-3 wks elbow/wrist  only;   3-6 wks begin PROM activities with posterior capsule mobilizations, closed chain scapula;   8 weeks begin deltoid/rotator cuff isometrics;   12 weeks, work on glenohumeral stabilization, cycling/running at 12 weeks;   4-5 months scapular stabilization, eccentric strengthening, plyometrics, endurance.          Patient is 3 weeks post-op on 11/22/22 (surgery date 11/1/22)      SUBJECTIVE     Pt reports: no pain; no issues with home program.  He was compliant with home exercise program.  Response to previous treatment: IE performed   Functional change: ongoing    Pain: 0/10  Location: right SLAP       OBJECTIVE     Objective measures taken at progress report unless specified otherwise.     TREATMENT       Long received the treatments listed  "below:       therapeutic exercises to develop strength, endurance, ROM, flexibility, and posture for 23 minutes includin way GH isometrics in sling 10x5" R  Wrist flexion/extension x1 min  Elbow flexion/extension x1 min  Scap squeezes 30x3"     Uninvolved arm only (left):  Shoulder rows with blue theraband 2x10   Shoulder extension with blue theraband 2x10  Tricep pulldown with blue theraband 2x10         manual therapy techniques: Joint mobilizations and Soft tissue Mobilization were applied for 0 minutes, including:  PROM flexion to 90, abduction to 45, external rotation to 20, and internal rotation to 30     neuromuscular re-education activities to improve: Balance, Coordination, Proprioception, and Posture for 0 minutes. The following activities were included:       PATIENT EDUCATION AND HOME EXERCISES     Home Exercises Provided and Patient Education Provided     Education provided:   - eduction on condition  -home exercise program     Written Home Exercises Provided: yes. Exercises were reviewed and Long was able to demonstrate them prior to the end of the session.  Long demonstrated good  understanding of the education provided. See EMR under Patient Instructions for exercises provided during therapy sessions.    ASSESSMENT     Long continues to be pain free at rest and with minimal exercise being performed. He is safe to begin PROM per protocol next visit along with prom exercises. Initiated contralateral training today with no issues.     Long Is progressing towards his goals.   Pt prognosis is Good.     Pt will continue to benefit from skilled outpatient physical therapy to address the deficits listed in the problem list box on initial evaluation, provide pt/family education and to maximize pt's level of independence in the home and community environment.     Pt's spiritual, cultural and educational needs considered and pt agreeable to plan of care and goals.     Anticipated barriers to physical " therapy: scheduling    Goals:   Short Term Goals:  5 weeks  1.Report decreased R shoulder pain < / =  0/10  to increase tolerance for daily activity  2. Increase PROM to within protocol guidelines   3. Increased strength by 1/3 MMT grade in R UE to increase tolerance for ADL and work activities.  4. Pt to tolerate HEP to improve ROM and independence with ADL's     Long Term Goals: 15 weeks  1.Pt to like 20 lbs without increase in pain for return to work  2.Increase AROM to equal to left   3.Increase strength to >/= 4/5 in R UE to increase tolerance for ADL and work activities.  4. Pt goal: Pt to perform weighted overhead activities without increase in pain to be able to return to softball  5. Pt will have improved gcode of CJ (20-40% limited) on FOTO shoulder in order to demonstrate true functional improvement.     PLAN     Plan of care Certification: 11/9/2022 to 2/28/23.    Cont POC    Alicia Ly, PT

## 2022-11-29 ENCOUNTER — CLINICAL SUPPORT (OUTPATIENT)
Dept: REHABILITATION | Facility: HOSPITAL | Age: 22
End: 2022-11-29
Attending: ORTHOPAEDIC SURGERY
Payer: MEDICAID

## 2022-11-29 DIAGNOSIS — S43.431D SUPERIOR GLENOID LABRUM LESION OF RIGHT SHOULDER, SUBSEQUENT ENCOUNTER: ICD-10-CM

## 2022-11-29 DIAGNOSIS — M25.611 DECREASED RIGHT SHOULDER RANGE OF MOTION: Primary | ICD-10-CM

## 2022-11-29 DIAGNOSIS — R29.898 WEAKNESS OF SHOULDER: ICD-10-CM

## 2022-11-29 PROCEDURE — 97110 THERAPEUTIC EXERCISES: CPT | Mod: PO,CQ

## 2022-11-29 NOTE — PROGRESS NOTES
[facility]   Physical Therapy Treatment Note     Name: Long Brunner  Clinic Number: [id number]    Therapy Diagnosis:   Encounter Diagnoses   Name Primary?    Decreased right shoulder range of motion Yes    Superior glenoid labrum lesion of right shoulder, subsequent encounter     Weakness of shoulder        Physician: Tj Fletcher IV, MD    Visit Date: 11/29/2022    Physician Orders: PT Eval and Treat   Medical Diagnosis from Referral: Superior glenoid labrum lesion of right shoulder, subsequent encounter [S43.431D]  Evaluation Date: 11/9/2022  Authorization Period Expiration: 2/12/23  Plan of Care Expiration: 2/28/23  Progress Note Due: 12/9/22  Visit # / Visits authorized: 3/20  FOTO: 1/3       PTA Visit #: 1/5     Precautions: Standard, POST OP     Time In: 2:30 pm  Time Out: 3:08 pm  Total Billable Time: 38 minutes - TE 3 (medicaid)    ROM: start PROM at 3 weeks, limit flexion 90', IR 45', abdxn 90';    at 6 weeks begin active/active assisted ROM passive ROM to tolerance, goal full ER, 135' flexion, 120' abduction;   at 12 weeks full ROM   Immobilizer: on at all times except hygiene and therapy for 6 weeks then d/c after 6 weeks   Exercises: 0-3 wks elbow/wrist  only;   3-6 wks begin PROM activities with posterior capsule mobilizations, closed chain scapula;   8 weeks begin deltoid/rotator cuff isometrics;   12 weeks, work on glenohumeral stabilization, cycling/running at 12 weeks;   4-5 months scapular stabilization, eccentric strengthening, plyometrics, endurance.          Patient is 3 weeks post-op on 11/22/22 (surgery date 11/1/22)      SUBJECTIVE     Pt reports: no new complaints.  He was compliant with home exercise program.  Response to previous treatment: felt fine  Functional change: ongoing    Pain: 0/10  Location: right SLAP       OBJECTIVE     Objective measures taken at progress report unless specified otherwise.     TREATMENT       Long received the treatments listed  "below:       therapeutic exercises to develop strength, endurance, ROM, flexibility, and posture for 23 minutes includin way GH isometrics in sling 10x5" R  Wrist flexion/extension x1 min  Elbow flexion/extension x1 min  Scap squeezes 30x3"    Uninvolved arm only (left):  Shoulder rows with blue theraband 2x10  Shoulder extension with blue theraband 2x10  Tricep pulldown with blue theraband 2x10     manual therapy techniques: Joint mobilizations and Soft tissue Mobilization were applied for 15 minutes, including:  PROM flexion to 90, abduction to 45, external rotation to 20, and internal rotation to 30     neuromuscular re-education activities to improve: Balance, Coordination, Proprioception, and Posture for 0 minutes. The following activities were included:       PATIENT EDUCATION AND HOME EXERCISES     Home Exercises Provided and Patient Education Provided     Education provided:   - eduction on condition  -home exercise program     Written Home Exercises Provided: yes. Exercises were reviewed and Long was able to demonstrate them prior to the end of the session.  Long demonstrated good  understanding of the education provided. See EMR under Patient Instructions for exercises provided during therapy sessions.    ASSESSMENT     Alves with good tolerance to tx session today. He continues to be pain free at rest and had no adverse sxs or reports of increased pain with exercises today. Pt with good tolerance with PROM within protocol with mild guarding and required verbal cues to relax. Continue to monitor and progress per protocol.     Long Is progressing towards his goals.   Pt prognosis is Good.     Pt will continue to benefit from skilled outpatient physical therapy to address the deficits listed in the problem list box on initial evaluation, provide pt/family education and to maximize pt's level of independence in the home and community environment.     Pt's spiritual, cultural and educational needs " considered and pt agreeable to plan of care and goals.     Anticipated barriers to physical therapy: scheduling    Goals:   Short Term Goals:  5 weeks  1.Report decreased R shoulder pain < / =  0/10  to increase tolerance for daily activity  2. Increase PROM to within protocol guidelines   3. Increased strength by 1/3 MMT grade in R UE to increase tolerance for ADL and work activities.  4. Pt to tolerate HEP to improve ROM and independence with ADL's     Long Term Goals: 15 weeks  1.Pt to like 20 lbs without increase in pain for return to work  2.Increase AROM to equal to left   3.Increase strength to >/= 4/5 in R UE to increase tolerance for ADL and work activities.  4. Pt goal: Pt to perform weighted overhead activities without increase in pain to be able to return to softball  5. Pt will have improved gcode of CJ (20-40% limited) on FOTO shoulder in order to demonstrate true functional improvement.     PLAN     Plan of care Certification: 11/9/2022 to 2/28/23.    Cont POC    Marisa Ruano, PTA

## 2022-12-01 ENCOUNTER — CLINICAL SUPPORT (OUTPATIENT)
Dept: REHABILITATION | Facility: HOSPITAL | Age: 22
End: 2022-12-01
Attending: ORTHOPAEDIC SURGERY
Payer: MEDICAID

## 2022-12-01 DIAGNOSIS — R29.898 WEAKNESS OF SHOULDER: ICD-10-CM

## 2022-12-01 DIAGNOSIS — M25.611 DECREASED RIGHT SHOULDER RANGE OF MOTION: Primary | ICD-10-CM

## 2022-12-01 DIAGNOSIS — S43.431D SUPERIOR GLENOID LABRUM LESION OF RIGHT SHOULDER, SUBSEQUENT ENCOUNTER: ICD-10-CM

## 2022-12-01 PROCEDURE — 97110 THERAPEUTIC EXERCISES: CPT | Mod: PO

## 2022-12-01 NOTE — PROGRESS NOTES
[facility]   Physical Therapy Treatment Note     Name: Long Brunner  Clinic Number: [id number]    Therapy Diagnosis:   No diagnosis found.      Physician: Tj Fletcher IV, MD    Visit Date: 12/1/2022    Physician Orders: PT Eval and Treat   Medical Diagnosis from Referral: Superior glenoid labrum lesion of right shoulder, subsequent encounter [S43.431D]  Evaluation Date: 11/9/2022  Authorization Period Expiration: 2/12/23  Plan of Care Expiration: 2/28/23  Progress Note Due: 12/9/22  Visit # / Visits authorized: 4/20  FOTO: 1/3       PTA Visit #: 0/5     Precautions: Standard, POST OP     Time In: 8:50  Time Out: 9:30  Total Billable Time: 40 minutes - TE 3 (medicaid)    ROM: start PROM at 3 weeks, limit flexion 90', IR 45', abdxn 90';    at 6 weeks begin active/active assisted ROM passive ROM to tolerance, goal full ER, 135' flexion, 120' abduction;   at 12 weeks full ROM   Immobilizer: on at all times except hygiene and therapy for 6 weeks then d/c after 6 weeks   Exercises: 0-3 wks elbow/wrist  only;   3-6 wks begin PROM activities with posterior capsule mobilizations, closed chain scapula;   8 weeks begin deltoid/rotator cuff isometrics;   12 weeks, work on glenohumeral stabilization, cycling/running at 12 weeks;   4-5 months scapular stabilization, eccentric strengthening, plyometrics, endurance.      The following procedures were performed in the Right Shoulder:  Arthroscopic Shoulder Debridement - Extensive (37986)  Arthroscopic Posterior Labral Repair ([id number])  Arthroscopic Superior Labrum (SLAP) Repair ([id number])          Patient is 4 weeks post-op on 11/29/22 (surgery date 11/1/22)      SUBJECTIVE     Pt reports: no pain, felt fine last visit. Band-aids fell off.   He was compliant with home exercise program.  Response to previous treatment: felt fine  Functional change: ongoing    Pain: 0/10  Location: right SLAP       OBJECTIVE     Objective measures taken at progress report  "unless specified otherwise.     TREATMENT       Long received the treatments listed below:       therapeutic exercises to develop strength, endurance, ROM, flexibility, and posture for 25 minutes includin way GH isometrics in sling 10x5" R  Wrist flexion/extension x1 min  Elbow flexion/extension x1 min  Scap squeezes 30x3"    Hands on wall at 90 alternating shoulder taps 2x10 B  Serratus push plus on the wall 4x10   Unweighted ball on wall up/down, side/side, cw, and ccw x15 each R     Uninvolved arm only (left):  Shoulder rows with blue theraband 2x10  Shoulder extension with blue theraband 2x10  Tricep pulldown with blue theraband 2x10     manual therapy techniques: Joint mobilizations and Soft tissue Mobilization were applied for 15 minutes, including:  PROM flexion to 90, abduction to 45, external rotation to 20, and internal rotation to 30     neuromuscular re-education activities to improve: Balance, Coordination, Proprioception, and Posture for 0 minutes. The following activities were included:       PATIENT EDUCATION AND HOME EXERCISES     Home Exercises Provided and Patient Education Provided     Education provided:   - eduction on condition  -home exercise program     Written Home Exercises Provided: yes. Exercises were reviewed and Long was able to demonstrate them prior to the end of the session.  Long demonstrated good  understanding of the education provided. See EMR under Patient Instructions for exercises provided during therapy sessions.    ASSESSMENT     Long with good tolerance to tx session today however requiring some rest breaks between newly added exercises which is expected post-operatively. His range of motion is progression well but there is some guarding present into shoulder flexion with mild discomfort reported. We will continue to progress him per protocol.     Long Is progressing towards his goals.   Pt prognosis is Good.     Pt will continue to benefit from skilled outpatient " physical therapy to address the deficits listed in the problem list box on initial evaluation, provide pt/family education and to maximize pt's level of independence in the home and community environment.     Pt's spiritual, cultural and educational needs considered and pt agreeable to plan of care and goals.     Anticipated barriers to physical therapy: scheduling    Goals:   Short Term Goals:  5 weeks  1.Report decreased R shoulder pain < / =  0/10  to increase tolerance for daily activity  2. Increase PROM to within protocol guidelines   3. Increased strength by 1/3 MMT grade in R UE to increase tolerance for ADL and work activities.  4. Pt to tolerate HEP to improve ROM and independence with ADL's     Long Term Goals: 15 weeks  1.Pt to like 20 lbs without increase in pain for return to work  2.Increase AROM to equal to left   3.Increase strength to >/= 4/5 in R UE to increase tolerance for ADL and work activities.  4. Pt goal: Pt to perform weighted overhead activities without increase in pain to be able to return to softball  5. Pt will have improved gcode of CJ (20-40% limited) on FOTO shoulder in order to demonstrate true functional improvement.     PLAN     Plan of care Certification: 11/9/2022 to 2/28/23.    Cont POC    Alicia Ly, PT

## 2022-12-07 ENCOUNTER — CLINICAL SUPPORT (OUTPATIENT)
Dept: REHABILITATION | Facility: HOSPITAL | Age: 22
End: 2022-12-07
Attending: ORTHOPAEDIC SURGERY
Payer: MEDICAID

## 2022-12-07 DIAGNOSIS — R29.898 WEAKNESS OF SHOULDER: ICD-10-CM

## 2022-12-07 DIAGNOSIS — M25.611 DECREASED RIGHT SHOULDER RANGE OF MOTION: Primary | ICD-10-CM

## 2022-12-07 DIAGNOSIS — S43.431D SUPERIOR GLENOID LABRUM LESION OF RIGHT SHOULDER, SUBSEQUENT ENCOUNTER: ICD-10-CM

## 2022-12-07 PROCEDURE — 97110 THERAPEUTIC EXERCISES: CPT | Mod: PO

## 2022-12-07 NOTE — PROGRESS NOTES
[facility]   Physical Therapy Treatment Note     Name: Long Brunner  Clinic Number: [id number]    Therapy Diagnosis:   Encounter Diagnoses   Name Primary?    Decreased right shoulder range of motion Yes    Superior glenoid labrum lesion of right shoulder, subsequent encounter     Weakness of shoulder          Physician: Tj Fletcher IV, MD    Visit Date: 12/7/2022    Physician Orders: PT Eval and Treat   Medical Diagnosis from Referral: Superior glenoid labrum lesion of right shoulder, subsequent encounter [S43.431D]  Evaluation Date: 11/9/2022  Authorization Period Expiration: 2/12/23  Plan of Care Expiration: 2/28/23  Progress Note Due: 12/9/22  Visit # / Visits authorized: 5/20  FOTO: 2/3       PTA Visit #: 0/5     Precautions: Standard, POST OP     Time In: 8:50  Time Out: 9:30  Total Billable Time: 40 minutes - TE 3 (medicaid)    ROM: start PROM at 3 weeks, limit flexion 90', IR 45', abdxn 90';    at 6 weeks begin active/active assisted ROM passive ROM to tolerance, goal full ER, 135' flexion, 120' abduction;   at 12 weeks full ROM   Immobilizer: on at all times except hygiene and therapy for 6 weeks then d/c after 6 weeks   Exercises: 0-3 wks elbow/wrist  only;   3-6 wks begin PROM activities with posterior capsule mobilizations, closed chain scapula;   8 weeks begin deltoid/rotator cuff isometrics;   12 weeks, work on glenohumeral stabilization, cycling/running at 12 weeks;   4-5 months scapular stabilization, eccentric strengthening, plyometrics, endurance.      The following procedures were performed in the Right Shoulder:  Arthroscopic Shoulder Debridement - Extensive ([id number])  Arthroscopic Posterior Labral Repair ([id number])  Arthroscopic Superior Labrum (SLAP) Repair ([id number])          Patient is 5 weeks post-op on 12/6/22 (surgery date 11/1/22)      SUBJECTIVE     Pt reports: soreness after exercises but felt fine the rest of the day   He was compliant with home exercise  "program.  Response to previous treatment: felt fine  Functional change: ongoing    Pain: 0/10  Location: right SLAP       OBJECTIVE     Objective measures taken at progress report unless specified otherwise.     TREATMENT       Long received the treatments listed below:       therapeutic exercises to develop strength, endurance, ROM, flexibility, and posture for 30 minutes includin way GH isometrics in sling 10x5" R  Scap squeezes 30x3"    Hands on wall at 90 alternating shoulder taps 2x10 B  Serratus push plus on the wall 4x10   Unweighted ball on wall up/down, side/side, cw, and ccw x15 each R     Uninvolved arm only (left):  Shoulder rows with blue theraband 2x10  Shoulder extension with blue theraband 2x10  Tricep pulldown with blue theraband 2x10    Discharged exercises:  Wrist flexion/extension x1 min  Elbow flexion/extension x1 min     manual therapy techniques: Joint mobilizations and Soft tissue Mobilization were applied for 10 minutes, including:  PROM flexion to 90, abduction to 45, external rotation to 20, and internal rotation to 30     neuromuscular re-education activities to improve: Balance, Coordination, Proprioception, and Posture for 0 minutes. The following activities were included:       PATIENT EDUCATION AND HOME EXERCISES     Home Exercises Provided and Patient Education Provided     Education provided:   - eduction on condition  -home exercise program     Written Home Exercises Provided: yes. Exercises were reviewed and Long was able to demonstrate them prior to the end of the session.  Long demonstrated good  understanding of the education provided. See EMR under Patient Instructions for exercises provided during therapy sessions.    ASSESSMENT     Long with good tolerance to tx session today with some notes of stretching over the top of the shoulder during passive range. Range of motion is meeting protocol guidelines at this time and he is ready to progress when he reaches 6 weeks " post op.     Long Is progressing towards his goals.   Pt prognosis is Good.     Pt will continue to benefit from skilled outpatient physical therapy to address the deficits listed in the problem list box on initial evaluation, provide pt/family education and to maximize pt's level of independence in the home and community environment.     Pt's spiritual, cultural and educational needs considered and pt agreeable to plan of care and goals.     Anticipated barriers to physical therapy: scheduling    Goals:   Short Term Goals:  5 weeks  1.Report decreased R shoulder pain < / =  0/10  to increase tolerance for daily activity  2. Increase PROM to within protocol guidelines   3. Increased strength by 1/3 MMT grade in R UE to increase tolerance for ADL and work activities.  4. Pt to tolerate HEP to improve ROM and independence with ADL's     Long Term Goals: 15 weeks  1.Pt to like 20 lbs without increase in pain for return to work  2.Increase AROM to equal to left   3.Increase strength to >/= 4/5 in R UE to increase tolerance for ADL and work activities.  4. Pt goal: Pt to perform weighted overhead activities without increase in pain to be able to return to softball  5. Pt will have improved gcode of CJ (20-40% limited) on FOTO shoulder in order to demonstrate true functional improvement.     PLAN     Plan of care Certification: 11/9/2022 to 2/28/23.    Cont POC    Alicia Ly, PT

## 2022-12-09 ENCOUNTER — CLINICAL SUPPORT (OUTPATIENT)
Dept: REHABILITATION | Facility: HOSPITAL | Age: 22
End: 2022-12-09
Attending: ORTHOPAEDIC SURGERY
Payer: MEDICAID

## 2022-12-09 DIAGNOSIS — S43.431D SUPERIOR GLENOID LABRUM LESION OF RIGHT SHOULDER, SUBSEQUENT ENCOUNTER: ICD-10-CM

## 2022-12-09 DIAGNOSIS — M25.611 DECREASED RIGHT SHOULDER RANGE OF MOTION: Primary | ICD-10-CM

## 2022-12-09 DIAGNOSIS — R29.898 WEAKNESS OF SHOULDER: ICD-10-CM

## 2022-12-09 PROCEDURE — 97110 THERAPEUTIC EXERCISES: CPT | Mod: PO

## 2022-12-09 NOTE — PROGRESS NOTES
[facility]   Physical Therapy Treatment Note     Name: Long Brunner  Clinic Number: [id number]    Therapy Diagnosis:   Encounter Diagnoses   Name Primary?    Decreased right shoulder range of motion Yes    Superior glenoid labrum lesion of right shoulder, subsequent encounter     Weakness of shoulder            Physician: Tj Fletcher IV, MD    Visit Date: 12/9/2022    Physician Orders: PT Eval and Treat   Medical Diagnosis from Referral: Superior glenoid labrum lesion of right shoulder, subsequent encounter [S43.431D]  Evaluation Date: 11/9/2022  Authorization Period Expiration: 2/12/23  Plan of Care Expiration: 2/28/23  Progress Note Due: 12/9/22  Visit # / Visits authorized: 6/20  FOTO: 2/3       PTA Visit #: 0/5     Precautions: Standard, POST OP     Time In: 8:15  Time Out: 8:45  Total Billable Time: 30 minutes - TE 2 (medicaid)    ROM: start PROM at 3 weeks, limit flexion 90', IR 45', abdxn 90';    at 6 weeks begin active/active assisted ROM passive ROM to tolerance, goal full ER, 135' flexion, 120' abduction;   at 12 weeks full ROM   Immobilizer: on at all times except hygiene and therapy for 6 weeks then d/c after 6 weeks   Exercises: 0-3 wks elbow/wrist  only;   3-6 wks begin PROM activities with posterior capsule mobilizations, closed chain scapula;   8 weeks begin deltoid/rotator cuff isometrics;   12 weeks, work on glenohumeral stabilization, cycling/running at 12 weeks;   4-5 months scapular stabilization, eccentric strengthening, plyometrics, endurance.      The following procedures were performed in the Right Shoulder:  Arthroscopic Shoulder Debridement - Extensive ([id number])  Arthroscopic Posterior Labral Repair ([id number])  Arthroscopic Superior Labrum (SLAP) Repair ([id number])          Patient is 5 weeks post-op on 12/6/22 (surgery date 11/1/22)      SUBJECTIVE     Pt reports: no pain/soreness  He was compliant with home exercise program.  Response to previous treatment: felt  "fine  Functional change: ongoing    Pain: 0/10  Location: right SLAP       OBJECTIVE     Objective measures taken at progress report unless specified otherwise.     Pt is reaching full range of motion within protocol guidelines at this time without pain.     TREATMENT       Long received the treatments listed below:       therapeutic exercises to develop strength, endurance, ROM, flexibility, and posture for 23 minutes includin way GH isometrics in sling 10x5" R  Scap squeezes 30x3"    Hands on wall at 90 alternating shoulder taps 2x10 B  Serratus push plus on the wall 4x10   Unweighted ball on wall up/down, side/side, cw, and ccw x20 each R     Uninvolved arm only (left):  Shoulder rows with blue theraband 2x10  Shoulder extension with blue theraband 2x10  Tricep pulldown with blue theraband 2x10    Discharged exercises:  Wrist flexion/extension x1 min  Elbow flexion/extension x1 min     manual therapy techniques: Joint mobilizations and Soft tissue Mobilization were applied for 7 minutes, including:  PROM flexion to 90, abduction to 45, external rotation to 20, and internal rotation to 30     neuromuscular re-education activities to improve: Balance, Coordination, Proprioception, and Posture for 0 minutes. The following activities were included:       PATIENT EDUCATION AND HOME EXERCISES     Home Exercises Provided and Patient Education Provided     Education provided:   - eduction on condition  -home exercise program     Written Home Exercises Provided: yes. Exercises were reviewed and Long was able to demonstrate them prior to the end of the session.  Long demonstrated good  understanding of the education provided. See EMR under Patient Instructions for exercises provided during therapy sessions.    ASSESSMENT     Long continues to do very well with PT exercises and manual therapy and has been pain free. There are no restrictions within the range of motion allowed by protocol at this time and he is ready " for progression to active assistive motion when he reaches the 6 week sarah. We discussed today that resistive exercises will begin after the 6 week sarah when active range of motion is allowed.     Long Is progressing towards his goals.   Pt prognosis is Good.     Pt will continue to benefit from skilled outpatient physical therapy to address the deficits listed in the problem list box on initial evaluation, provide pt/family education and to maximize pt's level of independence in the home and community environment.     Pt's spiritual, cultural and educational needs considered and pt agreeable to plan of care and goals.     Anticipated barriers to physical therapy: scheduling    Goals:   Short Term Goals:  5 weeks  1.Report decreased R shoulder pain < / =  0/10  to increase tolerance for daily activity  2. Increase PROM to within protocol guidelines   3. Increased strength by 1/3 MMT grade in R UE to increase tolerance for ADL and work activities.  4. Pt to tolerate HEP to improve ROM and independence with ADL's     Long Term Goals: 15 weeks  1.Pt to like 20 lbs without increase in pain for return to work  2.Increase AROM to equal to left   3.Increase strength to >/= 4/5 in R UE to increase tolerance for ADL and work activities.  4. Pt goal: Pt to perform weighted overhead activities without increase in pain to be able to return to softball  5. Pt will have improved gcode of CJ (20-40% limited) on FOTO shoulder in order to demonstrate true functional improvement.     PLAN     Plan of care Certification: 11/9/2022 to 2/28/23.    Cont POC    Alicia Ly, PT

## 2022-12-14 ENCOUNTER — CLINICAL SUPPORT (OUTPATIENT)
Dept: REHABILITATION | Facility: HOSPITAL | Age: 22
End: 2022-12-14
Attending: ORTHOPAEDIC SURGERY
Payer: MEDICAID

## 2022-12-14 DIAGNOSIS — S43.431D SUPERIOR GLENOID LABRUM LESION OF RIGHT SHOULDER, SUBSEQUENT ENCOUNTER: ICD-10-CM

## 2022-12-14 DIAGNOSIS — R29.898 WEAKNESS OF SHOULDER: ICD-10-CM

## 2022-12-14 DIAGNOSIS — M25.611 DECREASED RIGHT SHOULDER RANGE OF MOTION: Primary | ICD-10-CM

## 2022-12-14 PROCEDURE — 97110 THERAPEUTIC EXERCISES: CPT | Mod: PO

## 2022-12-14 NOTE — PROGRESS NOTES
[facility]   Physical Therapy Treatment Note     Name: Long Brunner  Clinic Number: [id number]    Therapy Diagnosis:   Encounter Diagnoses   Name Primary?    Decreased right shoulder range of motion Yes    Superior glenoid labrum lesion of right shoulder, subsequent encounter     Weakness of shoulder              Physician: Tj Fletcher IV, MD    Visit Date: 12/14/2022    Physician Orders: PT Eval and Treat   Medical Diagnosis from Referral: Superior glenoid labrum lesion of right shoulder, subsequent encounter [S43.431D]  Evaluation Date: 11/9/2022  Authorization Period Expiration: 2/12/23  Plan of Care Expiration: 2/28/23  Progress Note Due: 12/9/22  Visit # / Visits authorized: 6/20  FOTO: 2/3       PTA Visit #: 0/5     Precautions: Standard, POST OP     Time In: 8:45  Time Out: 9:32  Total Billable Time: 47 minutes - TE 3 (medicaid)    ROM: start PROM at 3 weeks, limit flexion 90', IR 45', abdxn 90';    at 6 weeks begin active/active assisted ROM; passive ROM to tolerance, goal full ER, 135' flexion, 120' abduction;   at 12 weeks full ROM   Immobilizer: on at all times except hygiene and therapy for 6 weeks then d/c after 6 weeks   Exercises: 0-3 wks elbow/wrist  only;   3-6 wks begin PROM activities with posterior capsule mobilizations, closed chain scapula;   8 weeks begin deltoid/rotator cuff isometrics;   12 weeks, work on glenohumeral stabilization, cycling/running at 12 weeks;   4-5 months scapular stabilization, eccentric strengthening, plyometrics, endurance.      The following procedures were performed in the Right Shoulder:  Arthroscopic Shoulder Debridement - Extensive (94925)  Arthroscopic Posterior Labral Repair ([id number])  Arthroscopic Superior Labrum (SLAP) Repair ([id number])          Patient is 6 weeks post-op on 12/13/22 (surgery date 11/1/22)      SUBJECTIVE     Pt reports: no pain/soreness today or after last visit  He was compliant with home exercise program.  Response  "to previous treatment: felt fine  Functional change: ongoing    Pain: 0/10  Location: right SLAP, rotator cuff debridement, posterior labral repair      OBJECTIVE     Objective measures taken at progress report unless specified otherwise.     Pt is reaching full range of motion within protocol guidelines at this time without pain.     TREATMENT       Long received the treatments listed below:       All billed as therex based on medicaid guidelines  therapeutic exercises to develop strength, endurance, ROM, flexibility, and posture for 38 minutes including:    +Supine AAROM flexion with dowel 2x10 with 3" holds  +standing AAROM abduction with dowel 2x10 with 3" holds  +seated external rotation with dowel 2x10 with 3" holds    +prone row to plane of body only without weight 3x8    +supine perturbations (at elbow) with shoulder at 90 and elbow straight 3x30"     5 way GH isometrics in sling 10x5" R  Scap squeezes 30x3"    Hands on wall at 90 alternating shoulder taps 2x10 B  Serratus push plus on the wall 3x10  Unweighted ball on wall up/down, side/side, cw, and ccw x20 each R     Uninvolved arm only (left):  Shoulder rows with blue theraband 2x10  Shoulder extension with blue theraband 2x10  Tricep pulldown with blue theraband 2x10       manual therapy techniques: Joint mobilizations and Soft tissue Mobilization were applied for 9 minutes, including:  passive ROM to tolerance, goal full ER, 135' flexion, 120' abduction;      neuromuscular re-education activities to improve: Balance, Coordination, Proprioception, and Posture for 0 minutes. The following activities were included:       PATIENT EDUCATION AND HOME EXERCISES     Home Exercises Provided and Patient Education Provided     Education provided:   - eduction on condition  -home exercise program     Written Home Exercises Provided: yes. Exercises were reviewed and Long was able to demonstrate them prior to the end of the session.  Long demonstrated good  " understanding of the education provided. See EMR under Patient Instructions for exercises provided during therapy sessions.    ASSESSMENT     Initiated active assistive range of motion today per protocol with only mild discomfort noted at end of available range. No shoulder hiking noted during forward elevation in supine however it is noted with shoulder abduction in standing. Mild improvement in scapulothoracic mechanics noted when verbal and tactile cues provided. Pt able to achieve shoulder flexion prom to protocol guidelines however some discomfort over the top and back of shoulder with abduction and external rotation with some limitations in range of motion per protocol.     Long Is progressing towards his goals.   Pt prognosis is Good.     Pt will continue to benefit from skilled outpatient physical therapy to address the deficits listed in the problem list box on initial evaluation, provide pt/family education and to maximize pt's level of independence in the home and community environment.     Pt's spiritual, cultural and educational needs considered and pt agreeable to plan of care and goals.     Anticipated barriers to physical therapy: scheduling    Goals:   Short Term Goals:  5 weeks  1.Report decreased R shoulder pain < / =  0/10  to increase tolerance for daily activity  2. Increase PROM to within protocol guidelines   3. Increased strength by 1/3 MMT grade in R UE to increase tolerance for ADL and work activities.  4. Pt to tolerate HEP to improve ROM and independence with ADL's     Long Term Goals: 15 weeks  1.Pt to like 20 lbs without increase in pain for return to work  2.Increase AROM to equal to left   3.Increase strength to >/= 4/5 in R UE to increase tolerance for ADL and work activities.  4. Pt goal: Pt to perform weighted overhead activities without increase in pain to be able to return to softball  5. Pt will have improved gcode of CJ (20-40% limited) on FOTO shoulder in order to demonstrate  true functional improvement.     PLAN     Plan of care Certification: 11/9/2022 to 2/28/23.    Cont POC    Alicia Ly PT

## 2022-12-15 ENCOUNTER — OFFICE VISIT (OUTPATIENT)
Dept: ORTHOPEDICS | Facility: CLINIC | Age: 22
End: 2022-12-15
Payer: MEDICAID

## 2022-12-15 VITALS
HEART RATE: 78 BPM | WEIGHT: 168.88 LBS | BODY MASS INDEX: 27.14 KG/M2 | DIASTOLIC BLOOD PRESSURE: 99 MMHG | HEIGHT: 66 IN | SYSTOLIC BLOOD PRESSURE: 151 MMHG

## 2022-12-15 DIAGNOSIS — S43.431D SUPERIOR GLENOID LABRUM LESION OF RIGHT SHOULDER, SUBSEQUENT ENCOUNTER: Primary | ICD-10-CM

## 2022-12-15 DIAGNOSIS — M75.101 TEAR OF RIGHT SUPRASPINATUS TENDON: ICD-10-CM

## 2022-12-15 PROCEDURE — 3008F BODY MASS INDEX DOCD: CPT | Mod: CPTII,,, | Performed by: ORTHOPAEDIC SURGERY

## 2022-12-15 PROCEDURE — 99213 OFFICE O/P EST LOW 20 MIN: CPT | Mod: PBBFAC,PN | Performed by: ORTHOPAEDIC SURGERY

## 2022-12-15 PROCEDURE — 99999 PR PBB SHADOW E&M-EST. PATIENT-LVL III: CPT | Mod: PBBFAC,,, | Performed by: ORTHOPAEDIC SURGERY

## 2022-12-15 PROCEDURE — 3080F DIAST BP >= 90 MM HG: CPT | Mod: CPTII,,, | Performed by: ORTHOPAEDIC SURGERY

## 2022-12-15 PROCEDURE — 99024 POSTOP FOLLOW-UP VISIT: CPT | Mod: ,,, | Performed by: ORTHOPAEDIC SURGERY

## 2022-12-15 PROCEDURE — 3008F PR BODY MASS INDEX (BMI) DOCUMENTED: ICD-10-PCS | Mod: CPTII,,, | Performed by: ORTHOPAEDIC SURGERY

## 2022-12-15 PROCEDURE — 1159F MED LIST DOCD IN RCRD: CPT | Mod: CPTII,,, | Performed by: ORTHOPAEDIC SURGERY

## 2022-12-15 PROCEDURE — 3077F PR MOST RECENT SYSTOLIC BLOOD PRESSURE >= 140 MM HG: ICD-10-PCS | Mod: CPTII,,, | Performed by: ORTHOPAEDIC SURGERY

## 2022-12-15 PROCEDURE — 99999 PR PBB SHADOW E&M-EST. PATIENT-LVL III: ICD-10-PCS | Mod: PBBFAC,,, | Performed by: ORTHOPAEDIC SURGERY

## 2022-12-15 PROCEDURE — 99024 PR POST-OP FOLLOW-UP VISIT: ICD-10-PCS | Mod: ,,, | Performed by: ORTHOPAEDIC SURGERY

## 2022-12-15 PROCEDURE — 3080F PR MOST RECENT DIASTOLIC BLOOD PRESSURE >= 90 MM HG: ICD-10-PCS | Mod: CPTII,,, | Performed by: ORTHOPAEDIC SURGERY

## 2022-12-15 PROCEDURE — 1159F PR MEDICATION LIST DOCUMENTED IN MEDICAL RECORD: ICD-10-PCS | Mod: CPTII,,, | Performed by: ORTHOPAEDIC SURGERY

## 2022-12-15 PROCEDURE — 3077F SYST BP >= 140 MM HG: CPT | Mod: CPTII,,, | Performed by: ORTHOPAEDIC SURGERY

## 2022-12-15 NOTE — PROGRESS NOTES
[facility], Orthopedics and Sports Medicine  [facility]    Shoulder Post-op Visit  12/15/2022     Pre-op Diagnosis:    Right shoulder pain  SLAP Tear              Supraspinatus tendon tear, partial        Posterior Labral Tear     Procedure: 11/1/2022  The following procedures were performed in the Right Shoulder:  Arthroscopic Shoulder Debridement - Extensive    Arthroscopic Posterior Labral Repair   Arthroscopic Superior Labrum (SLAP) Repair         Subjective:      Long Brunner is a 22 y.o. male who is now 6 weeks status post right shoulder surgery. The patient is not having any pain. The patient denies fever, wound drainage, increasing redness, pus, increasing pain, increasing swelling. Post op problems reported: none.    The patient is doing well overall with therapy.  No new problems.      Objective:      Ortho/SPM Exam  General: alert, appears stated age, and cooperative   Sutures: Sutures out.  Incision: healing well, no significant drainage, no dehiscence, no significant erythema  Tenderness: none  Forward Flexion: 150 degrees  External Rotation: 40 degrees  Elbow/Wrist/Hand: Full ROM  Neuro: Intact to light touch Ax/R/U/M  Vascular: CR<2s. Palpable radial pulse      Imaging:  None today       Assessment:       The patient is status post right shoulder surgery. The primary encounter diagnosis was Superior glenoid labrum lesion of right shoulder, subsequent encounter. A diagnosis of Tear of right supraspinatus tendon was also pertinent to this visit.  Doing well postoperatively. Continuation of post-op rehab course is recommended at this time. All of the patient's questions were answered.    Discontinue sling.  Continue therapy.   Plan for return work now, but needs to avoid overhead activity and heavy lifting over 10 lbs until 3 months after surgery on the surgical arm.  Plan for return to throwing program to start at physical therapy at 3 months after surgery.     Plan:      Tylenol  650mg TID PRN for pain.  Continue physical therapy.  Follow up at 3 months after surgery.       Tj Fletcher IV, MD   of Clinical Orthopedics  Department of Orthopedic Surgery  [facility]  Office: [phone]  Website: [email or web address]        No orders of the defined types were placed in this encounter.

## 2022-12-16 ENCOUNTER — CLINICAL SUPPORT (OUTPATIENT)
Dept: REHABILITATION | Facility: HOSPITAL | Age: 22
End: 2022-12-16
Attending: ORTHOPAEDIC SURGERY
Payer: MEDICAID

## 2022-12-16 DIAGNOSIS — S43.431D SUPERIOR GLENOID LABRUM LESION OF RIGHT SHOULDER, SUBSEQUENT ENCOUNTER: ICD-10-CM

## 2022-12-16 DIAGNOSIS — M25.611 DECREASED RIGHT SHOULDER RANGE OF MOTION: Primary | ICD-10-CM

## 2022-12-16 DIAGNOSIS — R29.898 WEAKNESS OF SHOULDER: ICD-10-CM

## 2022-12-16 PROCEDURE — 97110 THERAPEUTIC EXERCISES: CPT | Mod: PO

## 2022-12-16 NOTE — PROGRESS NOTES
[facility]   Physical Therapy Treatment Note     Name: Long Brunner  Clinic Number: [id number]    Therapy Diagnosis:   Encounter Diagnoses   Name Primary?    Decreased right shoulder range of motion Yes    Superior glenoid labrum lesion of right shoulder, subsequent encounter     Weakness of shoulder                Physician: Tj Fletcher IV, MD    Visit Date: 12/16/2022    Physician Orders: PT Eval and Treat   Medical Diagnosis from Referral: Superior glenoid labrum lesion of right shoulder, subsequent encounter [S43.431D]  Evaluation Date: 11/9/2022  Authorization Period Expiration: 2/12/23  Plan of Care Expiration: 2/28/23  Progress Note Due: 1/16/23  Visit # / Visits authorized: 8/20  FOTO: 2/3       PTA Visit #: 0/5     Precautions: Standard, POST OP     Time In: 8:17  Time Out: 9:00  Total Billable Time: 43 minutes - TE 3 (medicaid)    ROM: start PROM at 3 weeks, limit flexion 90', IR 45', abdxn 90';    at 6 weeks begin active/active assisted ROM; passive ROM to tolerance, goal full ER, 135' flexion, 120' abduction;   at 12 weeks full ROM   Immobilizer: on at all times except hygiene and therapy for 6 weeks then d/c after 6 weeks   Exercises: 0-3 wks elbow/wrist  only;   3-6 wks begin PROM activities with posterior capsule mobilizations, closed chain scapula;   8 weeks begin deltoid/rotator cuff isometrics;   12 weeks, work on glenohumeral stabilization, cycling/running at 12 weeks;   4-5 months scapular stabilization, eccentric strengthening, plyometrics, endurance.      Per surgeon note on 12/14/22: Plan for return work now, but needs to avoid overhead activity and heavy lifting over 10 lbs until 3 months after surgery on the surgical arm.  Plan for return to throwing program to start at physical therapy at 3 months after surgery.    The following procedures were performed in the Right Shoulder:  Arthroscopic Shoulder Debridement - Extensive (67978)  Arthroscopic Posterior  "Labral Repair ([id number])  Arthroscopic Superior Labrum (SLAP) Repair ([id number])          Patient is 6 weeks post-op on 12/13/22 (surgery date 11/1/22)      SUBJECTIVE     Pt reports: saw doctor and is released to return to work with no overhead activities and no lifting >10lbs.   He was compliant with home exercise program.  Response to previous treatment: felt fine  Functional change: ongoing    Pain: 0/10  Location: right SLAP, rotator cuff debridement, posterior labral repair      OBJECTIVE        Passive Range of Motion: (not reassessed today)  Shoulder Right Left   Flexion 90 NT   Abduction 45 NT   ER at 0 20 NT   ER at 90    IR 30 NT      Active Range of Motion:   Shoulder Right Left   Flexion 135 170   Abduction 160 180   ER at 0 40 NT   ER at 90 50 78   IR 35 To stomach at 0   Reach behind head yes yes   Reach behind back  NT yes      Strength:  Shoulder Right Left   Flexion NT 5/5   Abduction NT 4+/5   ER NT 4+/5   IR NT 4+/5      Joint Mobility: NT today     Palpation: no tenderness to palpation          Limitation/Restriction for FOTO shoulder  Survey     Therapist reviewed FOTO scores for Long Brunner on 11/9/2022.   FOTO documents entered into [employer] - see Media section.     Limitation Score: 55%           TREATMENT       Long received the treatments listed below:       All billed as therex based on medicaid guidelines  therapeutic exercises to develop strength, endurance, ROM, flexibility, and posture for 43 minutes including:    Supine AAROM flexion with dowel 2x10  standing AAROM abduction with dowel 2x10  seated external rotation with dowel 2x10    +sidelying flexion AROM 2x10  +sidelying external rotation AROM 2x10  +quadruped rocks x1 min   +plank on knees 3x30"    prone row to plane of body only without weight 3x8    supine perturbations (at elbow) with shoulder at 90 and elbow straight 3x30"     5 way GH isometrics in sling 10x5" R  Scap squeezes 20x3"    Hands on wall at 90 alternating shoulder " taps 2x10 B  Serratus push plus on the wall 2x10  Unweighted ball on wall up/down, side/side, cw, and ccw x20 each R      manual therapy techniques: Joint mobilizations and Soft tissue Mobilization were applied for 0 minutes, including:  passive ROM to tolerance, goal full ER, 135' flexion, 120' abduction;      neuromuscular re-education activities to improve: Balance, Coordination, Proprioception, and Posture for 0 minutes. The following activities were included:       PATIENT EDUCATION AND HOME EXERCISES     Home Exercises Provided and Patient Education Provided     Education provided:   - eduction on condition  -home exercise program     Written Home Exercises Provided: yes. Exercises were reviewed and Long was able to demonstrate them prior to the end of the session.  Long demonstrated good  understanding of the education provided. See EMR under Patient Instructions for exercises provided during therapy sessions.    ASSESSMENT     Released to work by surgeon with no overhead activity and no lifting >10 lbs. Initiated active range of motion today without issue as well as weightbearing through upper extremity. Per surgeon orders, return to throwing to begin at 3 months post op. Continue to progress per protocol.     Long Is progressing towards his goals.   Pt prognosis is Good.     Pt will continue to benefit from skilled outpatient physical therapy to address the deficits listed in the problem list box on initial evaluation, provide pt/family education and to maximize pt's level of independence in the home and community environment.     Pt's spiritual, cultural and educational needs considered and pt agreeable to plan of care and goals.     Anticipated barriers to physical therapy: scheduling    Goals:   Short Term Goals:  5 weeks  1.Report decreased R shoulder pain < / =  0/10  to increase tolerance for daily activity  2. Increase PROM to within protocol guidelines   3. Increased strength by 1/3 MMT grade in R  UE to increase tolerance for ADL and work activities.  4. Pt to tolerate HEP to improve ROM and independence with ADL's     Long Term Goals: 15 weeks  1.Pt to like 20 lbs without increase in pain for return to work  2.Increase AROM to equal to left   3.Increase strength to >/= 4/5 in R UE to increase tolerance for ADL and work activities.  4. Pt goal: Pt to perform weighted overhead activities without increase in pain to be able to return to softball  5. Pt will have improved gcode of CJ (20-40% limited) on FOTO shoulder in order to demonstrate true functional improvement.     PLAN     Plan of care Certification: 11/9/2022 to 2/28/23.    Cont POC    Alicia Ly, PT

## 2022-12-21 ENCOUNTER — CLINICAL SUPPORT (OUTPATIENT)
Dept: REHABILITATION | Facility: HOSPITAL | Age: 22
End: 2022-12-21
Attending: ORTHOPAEDIC SURGERY
Payer: MEDICAID

## 2022-12-21 DIAGNOSIS — S43.431D SUPERIOR GLENOID LABRUM LESION OF RIGHT SHOULDER, SUBSEQUENT ENCOUNTER: ICD-10-CM

## 2022-12-21 DIAGNOSIS — M25.611 DECREASED RIGHT SHOULDER RANGE OF MOTION: Primary | ICD-10-CM

## 2022-12-21 DIAGNOSIS — R29.898 WEAKNESS OF SHOULDER: ICD-10-CM

## 2022-12-21 PROCEDURE — 97110 THERAPEUTIC EXERCISES: CPT | Mod: PO

## 2022-12-21 NOTE — PROGRESS NOTES
[facility]   Physical Therapy Treatment Note     Name: Long Brunner  Clinic Number: [id number]    Therapy Diagnosis:   Encounter Diagnoses   Name Primary?    Decreased right shoulder range of motion Yes    Superior glenoid labrum lesion of right shoulder, subsequent encounter     Weakness of shoulder                  Physician: Tj Fletcher IV, MD    Visit Date: 12/21/2022    Physician Orders: PT Eval and Treat   Medical Diagnosis from Referral: Superior glenoid labrum lesion of right shoulder, subsequent encounter [S43.431D]  Evaluation Date: 11/9/2022  Authorization Period Expiration: 2/12/23  Plan of Care Expiration: 2/28/23  Progress Note Due: 1/16/23  Visit # / Visits authorized: 9/20  FOTO: 2/3       PTA Visit #: 0/5     Precautions: Standard, POST OP     Time In: 8:51  Time Out: 9:30  Total Billable Time: 39 minutes - TE 3 (medicaid)    ROM: start PROM at 3 weeks, limit flexion 90', IR 45', abdxn 90';    at 6 weeks begin active/active assisted ROM; passive ROM to tolerance, goal full ER, 135' flexion, 120' abduction;   at 12 weeks full ROM   Immobilizer: on at all times except hygiene and therapy for 6 weeks then d/c after 6 weeks   Exercises: 0-3 wks elbow/wrist  only;   3-6 wks begin PROM activities with posterior capsule mobilizations, closed chain scapula;   8 weeks begin deltoid/rotator cuff isometrics;   12 weeks, work on glenohumeral stabilization, cycling/running at 12 weeks;   4-5 months scapular stabilization, eccentric strengthening, plyometrics, endurance.      Per surgeon note on 12/14/22: Plan for return work now, but needs to avoid overhead activity and heavy lifting over 10 lbs until 3 months after surgery on the surgical arm.  Plan for return to throwing program to start at physical therapy at 3 months after surgery.    The following procedures were performed in the Right Shoulder:  Arthroscopic Shoulder Debridement - Extensive (10101)  Arthroscopic Posterior  "Labral Repair ([id number])  Arthroscopic Superior Labrum (SLAP) Repair (63589)          Patient is 7 weeks post-op on 12/20/22 (surgery date 11/1/22)      SUBJECTIVE     Pt reports: is back to work. No pain   He was compliant with home exercise program.  Response to previous treatment: felt fine  Functional change: ongoing    Pain: 0/10  Location: right SLAP, rotator cuff debridement, posterior labral repair      OBJECTIVE      Objective measures taken at progress report unless specified otherwise.     TREATMENT       Long received the treatments listed below:       All billed as therex based on medicaid guidelines  therapeutic exercises to develop strength, endurance, ROM, flexibility, and posture for 30 minutes including:    Scap squeezes 20x3"  sidelying flexion AROM 2x10  +Supine AROM shoulder flexion 2x10   seated external rotation with dowel 10x5"  +sidelying external rotation with towel under arm 2x10  standing AAROM abduction with dowel x10  +standing abduction AROM 2x10   +standing scaption in mirror 3x8  quadruped rocks x1 min  plank on knees and elbows 3x30"  prone row to plane of body only 3# 3x8  Hands on wall at 90 alternating shoulder taps 2x10 B  Serratus push plus on the wall 2x10  Green ball on wall up/down, side/side, cw, and ccw x20 each R   supine perturbations (at elbow) with shoulder at 90 and elbow straight 3x30"       5 way GH isometrics in sling 10x5" R    **start theraband exercises next visit**     manual therapy techniques: Joint mobilizations and Soft tissue Mobilization were applied for 9 minutes, including:  passive ROM to tolerance, goal full ER, 135' flexion, 120' abduction;      neuromuscular re-education activities to improve: Balance, Coordination, Proprioception, and Posture for 0 minutes. The following activities were included:       PATIENT EDUCATION AND HOME EXERCISES     Home Exercises Provided and Patient Education Provided     Education provided:   - eduction on " condition  -home exercise program     Written Home Exercises Provided: yes. Exercises were reviewed and Long was able to demonstrate them prior to the end of the session.  Long demonstrated good  understanding of the education provided. See EMR under Patient Instructions for exercises provided during therapy sessions.    ASSESSMENT     Long has returned to work and reports no issues. Progressed to active range today again with no issues. Will progress to theraband strengthening next visit and continue to work on scapulothoracic and glenohumeral rhythm and shoulder stabilization as well as PROM.     Long Is progressing towards his goals.   Pt prognosis is Good.     Pt will continue to benefit from skilled outpatient physical therapy to address the deficits listed in the problem list box on initial evaluation, provide pt/family education and to maximize pt's level of independence in the home and community environment.     Pt's spiritual, cultural and educational needs considered and pt agreeable to plan of care and goals.     Anticipated barriers to physical therapy: scheduling    Goals:   Short Term Goals:  5 weeks  1.Report decreased R shoulder pain < / =  0/10  to increase tolerance for daily activity  2. Increase PROM to within protocol guidelines   3. Increased strength by 1/3 MMT grade in R UE to increase tolerance for ADL and work activities.  4. Pt to tolerate HEP to improve ROM and independence with ADL's     Long Term Goals: 15 weeks  1.Pt to like 20 lbs without increase in pain for return to work  2.Increase AROM to equal to left   3.Increase strength to >/= 4/5 in R UE to increase tolerance for ADL and work activities.  4. Pt goal: Pt to perform weighted overhead activities without increase in pain to be able to return to softball  5. Pt will have improved gcode of CJ (20-40% limited) on FOTO shoulder in order to demonstrate true functional improvement.     PLAN     Plan of care Certification: 11/9/2022 to  2/28/23.    Cont POC    Alicia Ly, PT

## 2022-12-23 ENCOUNTER — CLINICAL SUPPORT (OUTPATIENT)
Dept: REHABILITATION | Facility: HOSPITAL | Age: 22
End: 2022-12-23
Attending: ORTHOPAEDIC SURGERY
Payer: MEDICAID

## 2022-12-23 DIAGNOSIS — R29.898 WEAKNESS OF SHOULDER: ICD-10-CM

## 2022-12-23 DIAGNOSIS — M25.611 DECREASED RIGHT SHOULDER RANGE OF MOTION: Primary | ICD-10-CM

## 2022-12-23 DIAGNOSIS — S43.431D SUPERIOR GLENOID LABRUM LESION OF RIGHT SHOULDER, SUBSEQUENT ENCOUNTER: ICD-10-CM

## 2022-12-23 PROCEDURE — 97110 THERAPEUTIC EXERCISES: CPT | Mod: PO

## 2022-12-23 NOTE — PROGRESS NOTES
[facility]   Physical Therapy Treatment Note     Name: Long Brunner  Clinic Number: [id number]    Therapy Diagnosis:   Encounter Diagnoses   Name Primary?    Decreased right shoulder range of motion Yes    Superior glenoid labrum lesion of right shoulder, subsequent encounter     Weakness of shoulder          Physician: Tj Fletcher IV, MD    Visit Date: 12/23/2022    Physician Orders: PT Eval and Treat   Medical Diagnosis from Referral: Superior glenoid labrum lesion of right shoulder, subsequent encounter [S43.431D]  Evaluation Date: 11/9/2022  Authorization Period Expiration: 2/12/23  Plan of Care Expiration: 2/28/23  Progress Note Due: 1/16/23  Visit # / Visits authorized: 9/20  FOTO: 2/3       PTA Visit #: 0/5     Precautions: Standard, POST OP     Time In: 8:17  Time Out: 9:01  Total Billable Time: 44 minutes - TE 3 (medicaid)    ROM: start PROM at 3 weeks, limit flexion 90', IR 45', abdxn 90';    at 6 weeks begin active/active assisted ROM; passive ROM to tolerance, goal full ER, 135' flexion, 120' abduction;   at 12 weeks full ROM   Immobilizer: on at all times except hygiene and therapy for 6 weeks then d/c after 6 weeks   Exercises: 0-3 wks elbow/wrist  only;   3-6 wks begin PROM activities with posterior capsule mobilizations, closed chain scapula;   8 weeks begin deltoid/rotator cuff isometrics;   12 weeks, work on glenohumeral stabilization, cycling/running at 12 weeks;   4-5 months scapular stabilization, eccentric strengthening, plyometrics, endurance.      Per surgeon note on 12/14/22: Plan for return work now, but needs to avoid overhead activity and heavy lifting over 10 lbs until 3 months after surgery on the surgical arm.  Plan for return to throwing program to start at physical therapy at 3 months after surgery.    The following procedures were performed in the Right Shoulder:  Arthroscopic Shoulder Debridement - Extensive (93600)  Arthroscopic Posterior Labral  "Repair (13365)  Arthroscopic Superior Labrum (SLAP) Repair (36349)          Patient is 7 weeks post-op on 12/20/22 (surgery date 11/1/22)      SUBJECTIVE     Pt reports: is back to work. No pain   He was compliant with home exercise program.  Response to previous treatment: felt fine  Functional change: ongoing    Pain: 0/10  Location: right SLAP, rotator cuff debridement, posterior labral repair      OBJECTIVE      Objective measures taken at progress report unless specified otherwise.     TREATMENT       Long received the treatments listed below:       All billed as therex based on medicaid guidelines  therapeutic exercises to develop strength, endurance, ROM, flexibility, and posture for 38 minutes including:    Scap squeezes 20x3"  Supine AROM shoulder flexion 2x10   seated external rotation with dowel 10x5"  sidelying external rotation with towel under arm 2x10  standing AAROM abduction with dowel x10  standing abduction AROM 2x10   standing scaption in mirror 3x8 (add 1# next visit)  plank on knees and elbows 2x30"  prone row to plane of body only 3# 3x8  +shoulder rows with red theraband 3x10 (green theraband at next visit)  +shoulder PNF 2 (sword from pocket) with yellow theraband 2x8  Hands on wall at 90 alternating shoulder taps 2x10 B  Serratus push plus on the wall 2x10  Red ball on wall up/down, side/side, cw, and ccw x20 each R   supine perturbations (at elbow) with shoulder at 90 and elbow straight 3x30"       Consider:  UBE       manual therapy techniques: Joint mobilizations and Soft tissue Mobilization were applied for 6 minutes, including:  passive ROM to tolerance, goal full ER, 135' flexion, 120' abduction;      neuromuscular re-education activities to improve: Balance, Coordination, Proprioception, and Posture for 0 minutes. The following activities were included:       PATIENT EDUCATION AND HOME EXERCISES     Home Exercises Provided and Patient Education Provided     Education provided:   - " eduction on condition  -home exercise program     Written Home Exercises Provided: yes. Exercises were reviewed and Long was able to demonstrate them prior to the end of the session.  Long demonstrated good  understanding of the education provided. See EMR under Patient Instructions for exercises provided during therapy sessions.    ASSESSMENT     Long is doing very well with no pain with newly added exercises. Active range is approaching full with passive remains a bit limited due to muscle guarding and difficulty relaxing. Will continue to progress per protocol.     Long Is progressing towards his goals.   Pt prognosis is Good.     Pt will continue to benefit from skilled outpatient physical therapy to address the deficits listed in the problem list box on initial evaluation, provide pt/family education and to maximize pt's level of independence in the home and community environment.     Pt's spiritual, cultural and educational needs considered and pt agreeable to plan of care and goals.     Anticipated barriers to physical therapy: scheduling    Goals:   Short Term Goals:  5 weeks  1.Report decreased R shoulder pain < / =  0/10  to increase tolerance for daily activity  2. Increase PROM to within protocol guidelines   3. Increased strength by 1/3 MMT grade in R UE to increase tolerance for ADL and work activities.  4. Pt to tolerate HEP to improve ROM and independence with ADL's     Long Term Goals: 15 weeks  1.Pt to like 20 lbs without increase in pain for return to work  2.Increase AROM to equal to left   3.Increase strength to >/= 4/5 in R UE to increase tolerance for ADL and work activities.  4. Pt goal: Pt to perform weighted overhead activities without increase in pain to be able to return to softball  5. Pt will have improved gcode of CJ (20-40% limited) on FOTO shoulder in order to demonstrate true functional improvement.     PLAN     Plan of care Certification: 11/9/2022 to 2/28/23.    Cont  POC    Alicia Ly, PT

## 2022-12-28 ENCOUNTER — CLINICAL SUPPORT (OUTPATIENT)
Dept: REHABILITATION | Facility: HOSPITAL | Age: 22
End: 2022-12-28
Attending: ORTHOPAEDIC SURGERY
Payer: MEDICAID

## 2022-12-28 DIAGNOSIS — M25.611 DECREASED RIGHT SHOULDER RANGE OF MOTION: Primary | ICD-10-CM

## 2022-12-28 DIAGNOSIS — R29.898 WEAKNESS OF SHOULDER: ICD-10-CM

## 2022-12-28 DIAGNOSIS — S43.431D SUPERIOR GLENOID LABRUM LESION OF RIGHT SHOULDER, SUBSEQUENT ENCOUNTER: ICD-10-CM

## 2022-12-28 PROCEDURE — 97110 THERAPEUTIC EXERCISES: CPT | Mod: PO

## 2022-12-28 NOTE — PROGRESS NOTES
[facility]   Physical Therapy Treatment Note     Name: Long Brunner  Clinic Number: [id number]    Therapy Diagnosis:   Encounter Diagnoses   Name Primary?    Decreased right shoulder range of motion Yes    Superior glenoid labrum lesion of right shoulder, subsequent encounter     Weakness of shoulder            Physician: Tj Fletcher IV, MD    Visit Date: 12/28/2022    Physician Orders: PT Eval and Treat   Medical Diagnosis from Referral: Superior glenoid labrum lesion of right shoulder, subsequent encounter [S43.431D]  Evaluation Date: 11/9/2022  Authorization Period Expiration: 2/12/23  Plan of Care Expiration: 2/28/23  Progress Note Due: 1/16/23  Visit # / Visits authorized: 10/20  FOTO: 2/3       PTA Visit #: 0/5     Precautions: Standard, POST OP     Time In: 8:45  Time Out: 9:31  Total Billable Time: 46 minutes - TE 3 (medicaid)    ROM: start PROM at 3 weeks, limit flexion 90', IR 45', abdxn 90';    at 6 weeks begin active/active assisted ROM; passive ROM to tolerance, goal full ER, 135' flexion, 120' abduction;   at 12 weeks full ROM   Immobilizer: on at all times except hygiene and therapy for 6 weeks then d/c after 6 weeks   Exercises: 0-3 wks elbow/wrist  only;   3-6 wks begin PROM activities with posterior capsule mobilizations, closed chain scapula;   8 weeks begin deltoid/rotator cuff isometrics;   12 weeks, work on glenohumeral stabilization, cycling/running at 12 weeks;   4-5 months scapular stabilization, eccentric strengthening, plyometrics, endurance.      Per surgeon note on 12/14/22: Plan for return work now, but needs to avoid overhead activity and heavy lifting over 10 lbs until 3 months after surgery on the surgical arm.  Plan for return to throwing program to start at physical therapy at 3 months after surgery.    The following procedures were performed in the Right Shoulder:  Arthroscopic Shoulder Debridement - Extensive (08174)  Arthroscopic Posterior Labral  "Repair ([id number])  Arthroscopic Superior Labrum (SLAP) Repair ([id number])          Patient is 8 weeks post-op on 12/27/22 (surgery date 11/1/22)      SUBJECTIVE     Pt reports: is back to work. No pain   He was compliant with home exercise program.  Response to previous treatment: felt fine  Functional change: ongoing    Pain: 0/10  Location: right SLAP, rotator cuff debridement, posterior labral repair      OBJECTIVE      Objective measures taken at progress report unless specified otherwise.     TREATMENT       Long received the treatments listed below:       All billed as therex based on medicaid guidelines  therapeutic exercises to develop strength, endurance, ROM, flexibility, and posture for 38 minutes including:    Scap squeezes 20x3"  Supine AROM shoulder flexion 2x10   standing scaption in mirror with 1# 3x8   seated external rotation with dowel 10x5"  sidelying external rotation with towel under arm 2x10  standing AAROM abduction with dowel x10  standing abduction AROM 1# 2x10   Plank in pushup position 2x30"  prone row to plane of body only 4# 3x8  shoulder rows with green theraband 3x10  +shoulder extension to neutral with orange theraband 3x10   +shoulder external rotation walkouts with yellow theraband 2x8  +shoulder internal rotation walkouts with yellow theraband 2x8  shoulder PNF 2 (sword from pocket) with yellow theraband 2x8  Hands on wall at 90 alternating shoulder taps 2x10 B  Push up with plus on the wall x15  Red ball on wall up/down, side/side, cw, and ccw x20 each R   supine perturbations (at elbow) with shoulder at 90 and elbow straight 3x30"       Consider:  UBE  Bilateral shoulder external rotation   [place] carry         manual therapy techniques: Joint mobilizations and Soft tissue Mobilization were applied for 8 minutes, including:  passive ROM to tolerance, goal full ER, 135' flexion, 120' abduction;      neuromuscular re-education activities to improve: Balance, Coordination, " Proprioception, and Posture for 0 minutes. The following activities were included:       PATIENT EDUCATION AND HOME EXERCISES     Home Exercises Provided and Patient Education Provided     Education provided:   - eduction on condition  -home exercise program     Written Home Exercises Provided: yes. Exercises were reviewed and Long was able to demonstrate them prior to the end of the session.  Long demonstrated good  understanding of the education provided. See EMR under Patient Instructions for exercises provided during therapy sessions.    ASSESSMENT     Long continues to progress very well with no pain with newly added exercises. We will continue to progress and load in different positions to prepare for throwing program to begin at 3 months post op. PROM continues to improve.     Long Is progressing towards his goals.   Pt prognosis is Good.     Pt will continue to benefit from skilled outpatient physical therapy to address the deficits listed in the problem list box on initial evaluation, provide pt/family education and to maximize pt's level of independence in the home and community environment.     Pt's spiritual, cultural and educational needs considered and pt agreeable to plan of care and goals.     Anticipated barriers to physical therapy: scheduling    Goals:   Short Term Goals:  5 weeks  1.Report decreased R shoulder pain < / =  0/10  to increase tolerance for daily activity  2. Increase PROM to within protocol guidelines   3. Increased strength by 1/3 MMT grade in R UE to increase tolerance for ADL and work activities.  4. Pt to tolerate HEP to improve ROM and independence with ADL's     Long Term Goals: 15 weeks  1.Pt to like 20 lbs without increase in pain for return to work  2.Increase AROM to equal to left   3.Increase strength to >/= 4/5 in R UE to increase tolerance for ADL and work activities.  4. Pt goal: Pt to perform weighted overhead activities without increase in pain to be able to return  to softball  5. Pt will have improved gcode of CJ (20-40% limited) on FOTO shoulder in order to demonstrate true functional improvement.     PLAN     Plan of care Certification: 11/9/2022 to 2/28/23.    Cont POC    Alicia Ly, PT

## 2022-12-30 ENCOUNTER — CLINICAL SUPPORT (OUTPATIENT)
Dept: REHABILITATION | Facility: HOSPITAL | Age: 22
End: 2022-12-30
Attending: ORTHOPAEDIC SURGERY
Payer: MEDICAID

## 2022-12-30 DIAGNOSIS — S43.431D SUPERIOR GLENOID LABRUM LESION OF RIGHT SHOULDER, SUBSEQUENT ENCOUNTER: ICD-10-CM

## 2022-12-30 DIAGNOSIS — M25.611 DECREASED RIGHT SHOULDER RANGE OF MOTION: Primary | ICD-10-CM

## 2022-12-30 DIAGNOSIS — R29.898 WEAKNESS OF SHOULDER: ICD-10-CM

## 2022-12-30 PROCEDURE — 97110 THERAPEUTIC EXERCISES: CPT | Mod: PO

## 2022-12-30 NOTE — PROGRESS NOTES
[facility]   Physical Therapy Treatment Note     Name: Long Brunner  Clinic Number: [id number]    Therapy Diagnosis:   Encounter Diagnoses   Name Primary?    Decreased right shoulder range of motion Yes    Superior glenoid labrum lesion of right shoulder, subsequent encounter     Weakness of shoulder            Physician: Tj Fletcher IV, MD    Visit Date: 12/30/2022    Physician Orders: PT Eval and Treat   Medical Diagnosis from Referral: Superior glenoid labrum lesion of right shoulder, subsequent encounter [S43.431D]  Evaluation Date: 11/9/2022  Authorization Period Expiration: 2/12/23  Plan of Care Expiration: 2/28/23  Progress Note Due: 1/16/23  Visit # / Visits authorized: 10/20  FOTO: 2/3       PTA Visit #: 0/5     Precautions: Standard, POST OP     Time In: 8:15  Time Out: 9:08  Total Billable Time: 53 minutes - TE 4 (medicaid)    ROM: start PROM at 3 weeks, limit flexion 90', IR 45', abdxn 90';    at 6 weeks begin active/active assisted ROM; passive ROM to tolerance, goal full ER, 135' flexion, 120' abduction;   at 12 weeks full ROM   Immobilizer: on at all times except hygiene and therapy for 6 weeks then d/c after 6 weeks   Exercises: 0-3 wks elbow/wrist  only;   3-6 wks begin PROM activities with posterior capsule mobilizations, closed chain scapula;   8 weeks begin deltoid/rotator cuff isometrics;   12 weeks, work on glenohumeral stabilization, cycling/running at 12 weeks;   4-5 months scapular stabilization, eccentric strengthening, plyometrics, endurance.      Per surgeon note on 12/14/22: Plan for return work now, but needs to avoid overhead activity and heavy lifting over 10 lbs until 3 months after surgery on the surgical arm.  Plan for return to throwing program to start at physical therapy at 3 months after surgery.    The following procedures were performed in the Right Shoulder:  Arthroscopic Shoulder Debridement - Extensive (98072)  Arthroscopic Posterior Labral  "Repair ([id number])  Arthroscopic Superior Labrum (SLAP) Repair ([id number])          Patient is 8 weeks post-op on 12/27/22 (surgery date 11/1/22)      SUBJECTIVE     Pt reports: was sore yesterday but did lots of internal rotation and external rotation at work after adding those exercises in clinic. Feeling fine today   He was compliant with home exercise program.  Response to previous treatment: felt fine  Functional change: ongoing    Pain: 0/10  Location: right SLAP, rotator cuff debridement, posterior labral repair      OBJECTIVE      Objective measures taken at progress report unless specified otherwise.     TREATMENT       Long received the treatments listed below:       All billed as therex based on medicaid guidelines  therapeutic exercises to develop strength, endurance, ROM, flexibility, and posture for 43 minutes including:    Prone scap squeezes 20x3"  standing scaption in mirror with 1# 3x8   sidelying external rotation with towel under arm no weight 2x10  standing abduction AROM 1# 2x10   Plank in pushup position 2x30"  prone row to plane of body only 4# 3x8  shoulder rows with green theraband 3x10  shoulder extension to neutral with green theraband 3x10  +tricep extension with green theraband 3x10 - increase resistance next visit  shoulder external rotation walkouts with yellow theraband 2x8  shoulder internal rotation walkouts with yellow theraband 2x8  shoulder PNF 2 (sword from pocket) with yellow theraband 2x8  +[place] carry 5# x2 laps around gym   Hands on wall at 90 alternating shoulder taps 2x10 B  Push up with plus on the wall x15  Red ball on wall up/down, side/side, cw, and ccw x20 each R   supine perturbations (at wrist) with shoulder at 90 and elbow straight with eyes closed 3x30"         Consider:  UBE  Bilateral shoulder external rotation at 9-10 wks      Discharged exercises:  Supine AROM shoulder flexion 2x10   seated external rotation with dowel 10x5"  standing AAROM abduction with dowel " x10     manual therapy techniques: Joint mobilizations and Soft tissue Mobilization were applied for 10 minutes, including:  passive ROM to tolerance, goal full ER, 135' flexion, 120' abduction;      neuromuscular re-education activities to improve: Balance, Coordination, Proprioception, and Posture for 0 minutes. The following activities were included:       PATIENT EDUCATION AND HOME EXERCISES     Home Exercises Provided and Patient Education Provided     Education provided:   - eduction on condition  -home exercise program     Written Home Exercises Provided: yes. Exercises were reviewed and Long was able to demonstrate them prior to the end of the session.  Long demonstrated good  understanding of the education provided. See EMR under Patient Instructions for exercises provided during therapy sessions.    ASSESSMENT     Long continues to progress well within protocol guidelines. PROM improving as well. Will begin stabilization work at shoulder height within the next few visits. Progress as tolerated.     Long Is progressing towards his goals.   Pt prognosis is Good.     Pt will continue to benefit from skilled outpatient physical therapy to address the deficits listed in the problem list box on initial evaluation, provide pt/family education and to maximize pt's level of independence in the home and community environment.     Pt's spiritual, cultural and educational needs considered and pt agreeable to plan of care and goals.     Anticipated barriers to physical therapy: scheduling    Goals:   Short Term Goals:  5 weeks  1.Report decreased R shoulder pain < / =  0/10  to increase tolerance for daily activity  2. Increase PROM to within protocol guidelines   3. Increased strength by 1/3 MMT grade in R UE to increase tolerance for ADL and work activities.  4. Pt to tolerate HEP to improve ROM and independence with ADL's     Long Term Goals: 15 weeks  1.Pt to like 20 lbs without increase in pain for return to  work  2.Increase AROM to equal to left   3.Increase strength to >/= 4/5 in R UE to increase tolerance for ADL and work activities.  4. Pt goal: Pt to perform weighted overhead activities without increase in pain to be able to return to softball  5. Pt will have improved gcode of CJ (20-40% limited) on FOTO shoulder in order to demonstrate true functional improvement.     PLAN     Plan of care Certification: 11/9/2022 to 2/28/23.    Cont POC    Alicia Ly, PT

## 2023-01-04 ENCOUNTER — CLINICAL SUPPORT (OUTPATIENT)
Dept: REHABILITATION | Facility: HOSPITAL | Age: 23
End: 2023-01-04
Attending: ORTHOPAEDIC SURGERY
Payer: MEDICAID

## 2023-01-04 DIAGNOSIS — M25.611 DECREASED RIGHT SHOULDER RANGE OF MOTION: Primary | ICD-10-CM

## 2023-01-04 DIAGNOSIS — R29.898 WEAKNESS OF SHOULDER: ICD-10-CM

## 2023-01-04 DIAGNOSIS — S43.431D SUPERIOR GLENOID LABRUM LESION OF RIGHT SHOULDER, SUBSEQUENT ENCOUNTER: ICD-10-CM

## 2023-01-04 PROCEDURE — 97110 THERAPEUTIC EXERCISES: CPT | Mod: PO

## 2023-01-04 NOTE — PROGRESS NOTES
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    Decreased right shoulder range of motion Yes    Superior glenoid labrum lesion of right shoulder, subsequent encounter     Weakness of shoulder          Physician: Tj Fletcher IV, MD    Visit Date: 1/4/2023    Physician Orders: PT Eval and Treat   Medical Diagnosis from Referral: Superior glenoid labrum lesion of right shoulder, subsequent encounter [S43.431D]  Evaluation Date: 11/9/2022  Authorization Period Expiration: 4/12/23  Plan of Care Expiration: 2/28/23  Progress Note Due: 1/16/23  Visit # / Visits authorized: 1/10  FOTO: 4/3 EPISODE COMPLETED AND CLOSED  PTA Visit #: 0/5     Precautions: Standard, POST OP     Time In: 8:00  Time Out: 8:45  Total Billable Time: 45 minutes - TE 3 (medicaid)    ROM: start PROM at 3 weeks, limit flexion 90', IR 45', abdxn 90';    at 6 weeks begin active/active assisted ROM; passive ROM to tolerance, goal full ER, 135' flexion, 120' abduction;   at 12 weeks full ROM   Immobilizer: on at all times except hygiene and therapy for 6 weeks then d/c after 6 weeks   Exercises: 0-3 wks elbow/wrist  only;   3-6 wks begin PROM activities with posterior capsule mobilizations, closed chain scapula;   8 weeks begin deltoid/rotator cuff isometrics;   12 weeks, work on glenohumeral stabilization, cycling/running at 12 weeks;   4-5 months scapular stabilization, eccentric strengthening, plyometrics, endurance.      Per surgeon note on 12/14/22: Plan for return work now, but needs to avoid overhead activity and heavy lifting over 10 lbs until 3 months after surgery on the surgical arm.  Plan for return to throwing program to start at physical therapy at 3 months after surgery.    The following procedures were performed in the Right Shoulder:  Arthroscopic Shoulder Debridement - Extensive (75555)  Arthroscopic  "Posterior Labral Repair ([id number])  Arthroscopic Superior Labrum (SLAP) Repair ([id number])          Patient is 9 weeks post-op on 1/3/22 (surgery date 11/1/22)      SUBJECTIVE     Pt reports: was sore yesterday but did lots of internal rotation and external rotation at work after adding those exercises in clinic. Feeling fine today   He was compliant with home exercise program.  Response to previous treatment: felt fine  Functional change: ongoing    Pain: 0/10  Location: right SLAP, rotator cuff debridement, posterior labral repair      OBJECTIVE      Objective measures taken at progress report unless specified otherwise.     TREATMENT       Long received the treatments listed below:       All billed as therex based on medicaid guidelines  therapeutic exercises to develop strength, endurance, ROM, flexibility, and posture for 38 minutes including:    Prone scap squeezes 20x3"  standing scaption in mirror with 1# 3x8   sidelying external rotation with towel under arm with 1# 3x8   standing abduction AROM 1# 2x10   Plank in pushup position 2x30"  prone row to plane of body only 4# 3x8  shoulder rows with green theraband 3x10  shoulder extension to neutral with green theraband 3x10  tricep extension with purple theraband 3x10 - increase resistance next visit  shoulder external rotation walkouts with red theraband 2x8  shoulder internal rotation walkouts with red theraband 2x8  shoulder PNF 2 (sword from pocket) with yellow theraband 2x8  [place] carry 5# x2 laps around gym   Push up with plus on the wall x15  Hands on wall at 90 alternating shoulder taps 2x10 B - NP today  Red ball on wall up/down, side/side, cw, and ccw x20 each R   supine perturbations (at wrist) with shoulder at 90 and elbow straight with eyes closed 3x30"         Consider:  UBE  Bilateral shoulder external rotation at 9-10 wks        Discharged exercises:  Supine AROM shoulder flexion 2x10   seated external rotation with dowel 10x5"  standing AAROM " abduction with dowel x10     manual therapy techniques: Joint mobilizations and Soft tissue Mobilization were applied for 7 minutes, including:  passive ROM to tolerance, goal is full  Supine scapular pin and stretch into shoulder abduction      neuromuscular re-education activities to improve: Balance, Coordination, Proprioception, and Posture for 0 minutes. The following activities were included:       PATIENT EDUCATION AND HOME EXERCISES     Home Exercises Provided and Patient Education Provided     Education provided:   - eduction on condition  -home exercise program     Written Home Exercises Provided: yes. Exercises were reviewed and Long was able to demonstrate them prior to the end of the session.  Long demonstrated good  understanding of the education provided. See EMR under Patient Instructions for exercises provided during therapy sessions.    ASSESSMENT     Long continues to progress well within protocol guidelines. PROM improving as well but remains limited. May consider addition of lat stretching in future visits. Will continue to progress rotator cuff strength and stabilization activities to prepare for throwing program at post-op 3 months.     Long Is progressing towards his goals.   Pt prognosis is Good.     Pt will continue to benefit from skilled outpatient physical therapy to address the deficits listed in the problem list box on initial evaluation, provide pt/family education and to maximize pt's level of independence in the home and community environment.     Pt's spiritual, cultural and educational needs considered and pt agreeable to plan of care and goals.     Anticipated barriers to physical therapy: scheduling    Goals:   Short Term Goals:  5 weeks  1.Report decreased R shoulder pain < / =  0/10  to increase tolerance for daily activity  2. Increase PROM to within protocol guidelines   3. Increased strength by 1/3 MMT grade in R UE to increase tolerance for ADL and work activities.  4. Pt  to tolerate HEP to improve ROM and independence with ADL's     Long Term Goals: 15 weeks  1.Pt to like 20 lbs without increase in pain for return to work  2.Increase AROM to equal to left   3.Increase strength to >/= 4/5 in R UE to increase tolerance for ADL and work activities.  4. Pt goal: Pt to perform weighted overhead activities without increase in pain to be able to return to softball  5. Pt will have improved gcode of CJ (20-40% limited) on FOTO shoulder in order to demonstrate true functional improvement.     PLAN     Plan of care Certification: 11/9/2022 to 2/28/23.    Cont POC    Alicia Ly, PT

## 2023-01-06 ENCOUNTER — CLINICAL SUPPORT (OUTPATIENT)
Dept: REHABILITATION | Facility: HOSPITAL | Age: 23
End: 2023-01-06
Attending: ORTHOPAEDIC SURGERY
Payer: MEDICAID

## 2023-01-06 DIAGNOSIS — R29.898 WEAKNESS OF SHOULDER: ICD-10-CM

## 2023-01-06 DIAGNOSIS — S43.431D SUPERIOR GLENOID LABRUM LESION OF RIGHT SHOULDER, SUBSEQUENT ENCOUNTER: ICD-10-CM

## 2023-01-06 DIAGNOSIS — M25.611 DECREASED RIGHT SHOULDER RANGE OF MOTION: Primary | ICD-10-CM

## 2023-01-06 PROCEDURE — 97110 THERAPEUTIC EXERCISES: CPT | Mod: PO

## 2023-01-06 NOTE — PROGRESS NOTES
[facility]   Physical Therapy Treatment Note     Name: Long Brunner  Clinic Number: [id number]    Therapy Diagnosis:   Encounter Diagnoses   Name Primary?    Decreased right shoulder range of motion Yes    Superior glenoid labrum lesion of right shoulder, subsequent encounter     Weakness of shoulder            Physician: Tj Fletcher IV, MD    Visit Date: 1/6/2023    Physician Orders: PT Eval and Treat   Medical Diagnosis from Referral: Superior glenoid labrum lesion of right shoulder, subsequent encounter [S43.431D]  Evaluation Date: 11/9/2022  Authorization Period Expiration: 4/12/23  Plan of Care Expiration: 2/28/23  Progress Note Due: 1/16/23  Visit # / Visits authorized: 2/10  FOTO: 4/3 EPISODE COMPLETED AND CLOSED  PTA Visit #: 0/5     Precautions: Standard, POST OP     Time In: 8:17  Time Out: 0:07  Total Billable Time: 50 minutes - TE 3 (medicaid)    ROM: start PROM at 3 weeks, limit flexion 90', IR 45', abdxn 90';    at 6 weeks begin active/active assisted ROM; passive ROM to tolerance, goal full ER, 135' flexion, 120' abduction;   at 12 weeks full ROM   Immobilizer: on at all times except hygiene and therapy for 6 weeks then d/c after 6 weeks   Exercises: 0-3 wks elbow/wrist  only;   3-6 wks begin PROM activities with posterior capsule mobilizations, closed chain scapula;   8 weeks begin deltoid/rotator cuff isometrics;   12 weeks, work on glenohumeral stabilization, cycling/running at 12 weeks;   4-5 months scapular stabilization, eccentric strengthening, plyometrics, endurance.      Per surgeon note on 12/14/22: Plan for return work now, but needs to avoid overhead activity and heavy lifting over 10 lbs until 3 months after surgery on the surgical arm.  Plan for return to throwing program to start at physical therapy at 3 months after surgery.    The following procedures were performed in the Right Shoulder:  Arthroscopic Shoulder Debridement - Extensive  "([id number])  Arthroscopic Posterior Labral Repair ([id number])  Arthroscopic Superior Labrum (SLAP) Repair ([id number])          Patient is 9 weeks post-op on 1/3/22 (surgery date 11/1/22)      SUBJECTIVE     Pt reports: was sore yesterday but did lots of internal rotation and external rotation at work after adding those exercises in clinic. Feeling fine today   He was compliant with home exercise program.  Response to previous treatment: felt fine  Functional change: ongoing    Pain: 0/10  Location: right SLAP, rotator cuff debridement, posterior labral repair      OBJECTIVE      Objective measures taken at progress report unless specified otherwise.     TREATMENT       Long received the treatments listed below:       All billed as therex based on medicaid guidelines  therapeutic exercises to develop strength, endurance, ROM, flexibility, and posture for 40 minutes including:    Prone scap squeezes 20x3"  standing scaption in mirror with 2# 3x8   sidelying external rotation with towel under arm with 2# 3x8   standing abduction AROM 2# 2x10   Plank in pushup position 2x30"  prone row to plane of body only 5# 3x8  +prone row with ER (8/10 pain with activity at 1/6/23 visit)  +prone T 2x8  +prone Y 2x8  +Bilateral shoulder external rotation with yellow theraband 3x8  Shoulder external rotation with yellow theraband 3x8  Shoulder internal rotation with yellow theraband 3x8   shoulder PNF D2 (sword from pocket) with yellow theraband 2x8  [place] carry 10# x2 laps around gym   Push up with plus on the wall x15  Yellow med ball on wall up/down, side/side, cw, and ccw x20 each R   +wall clocks (1, 3, and 5) with yellow theraband at wrist x5   +serratus wall slides with pillow case x15  +supine lat stretch with 0# dowel 15x5"  supine perturbations (at wrist) with shoulder at 90 and elbow straight with eyes closed 3x30"         Discharged exercises:  Supine AROM shoulder flexion 2x10   seated external rotation with dowel 10x5"  Hands on " wall at 90 alternating shoulder taps 2x10 B  standing AAROM abduction with dowel x10  shoulder rows with green theraband 3x10  shoulder extension to neutral with green theraband 3x10  tricep extension with purple theraband 3x10  shoulder external rotation walkouts with red theraband 2x8  shoulder internal rotation walkouts with red theraband 2x8     manual therapy techniques: Joint mobilizations and Soft tissue Mobilization were applied for 15 minutes, including:  passive ROM to tolerance (goal is full by 12 weeks)  Supine scapular pin and stretch into shoulder abduction      neuromuscular re-education activities to improve: Balance, Coordination, Proprioception, and Posture for 0 minutes. The following activities were included:       PATIENT EDUCATION AND HOME EXERCISES     Home Exercises Provided and Patient Education Provided     Education provided:   - eduction on condition  -home exercise program     Written Home Exercises Provided: yes. Exercises were reviewed and Long was able to demonstrate them prior to the end of the session.  Long demonstrated good  understanding of the education provided. See EMR under Patient Instructions for exercises provided during therapy sessions.    ASSESSMENT     Long did well today progressions to active exercises with increased resistance. PROM remains limited at this tome however is progressing towards full per protocol guidelines. There is still stretching felt over the top of the shoulder with PROM. Continue to progress per protocol.     Long Is progressing towards his goals.   Pt prognosis is Good.     Pt will continue to benefit from skilled outpatient physical therapy to address the deficits listed in the problem list box on initial evaluation, provide pt/family education and to maximize pt's level of independence in the home and community environment.     Pt's spiritual, cultural and educational needs considered and pt agreeable to plan of care and goals.     Anticipated  barriers to physical therapy: scheduling    Goals:   Short Term Goals:  5 weeks  1.Report decreased R shoulder pain < / =  0/10  to increase tolerance for daily activity  2. Increase PROM to within protocol guidelines   3. Increased strength by 1/3 MMT grade in R UE to increase tolerance for ADL and work activities.  4. Pt to tolerate HEP to improve ROM and independence with ADL's     Long Term Goals: 15 weeks  1.Pt to like 20 lbs without increase in pain for return to work  2.Increase AROM to equal to left   3.Increase strength to >/= 4/5 in R UE to increase tolerance for ADL and work activities.  4. Pt goal: Pt to perform weighted overhead activities without increase in pain to be able to return to softball  5. Pt will have improved gcode of CJ (20-40% limited) on FOTO shoulder in order to demonstrate true functional improvement.     PLAN     Plan of care Certification: 11/9/2022 to 2/28/23.    Cont POC    Alicia Ly, PT

## 2023-01-10 ENCOUNTER — CLINICAL SUPPORT (OUTPATIENT)
Dept: REHABILITATION | Facility: HOSPITAL | Age: 23
End: 2023-01-10
Attending: ORTHOPAEDIC SURGERY
Payer: MEDICAID

## 2023-01-10 DIAGNOSIS — M25.611 DECREASED RIGHT SHOULDER RANGE OF MOTION: Primary | ICD-10-CM

## 2023-01-10 DIAGNOSIS — R29.898 WEAKNESS OF SHOULDER: ICD-10-CM

## 2023-01-10 DIAGNOSIS — S43.431D SUPERIOR GLENOID LABRUM LESION OF RIGHT SHOULDER, SUBSEQUENT ENCOUNTER: ICD-10-CM

## 2023-01-10 PROCEDURE — 97110 THERAPEUTIC EXERCISES: CPT | Mod: PO

## 2023-01-10 NOTE — PROGRESS NOTES
[facility]   Physical Therapy Treatment Note     Name: Long Brunner  Clinic Number: [id number]    Therapy Diagnosis:   Encounter Diagnoses   Name Primary?    Decreased right shoulder range of motion Yes    Superior glenoid labrum lesion of right shoulder, subsequent encounter     Weakness of shoulder            Physician: Tj Fletcher IV, MD    Visit Date: 1/10/2023    Physician Orders: PT Eval and Treat   Medical Diagnosis from Referral: Superior glenoid labrum lesion of right shoulder, subsequent encounter [S43.431D]  Evaluation Date: 11/9/2022  Authorization Period Expiration: 4/12/23  Plan of Care Expiration: 2/28/23  Progress Note Due: 1/16/23  Visit # / Visits authorized: 3/10  FOTO: 4/3 EPISODE COMPLETED AND CLOSED  PTA Visit #: 0/5     Precautions: Standard, POST OP     Time In: 12:45 pm  Time Out: 1:30 pm  Total Billable Time: 45 minutes - TE 3 (medicaid)    ROM: start PROM at 3 weeks, limit flexion 90', IR 45', abdxn 90';    at 6 weeks begin active/active assisted ROM; passive ROM to tolerance, goal full ER, 135' flexion, 120' abduction;   at 12 weeks full ROM   Immobilizer: on at all times except hygiene and therapy for 6 weeks then d/c after 6 weeks   Exercises: 0-3 wks elbow/wrist  only;   3-6 wks begin PROM activities with posterior capsule mobilizations, closed chain scapula;   8 weeks begin deltoid/rotator cuff isometrics;   12 weeks, work on glenohumeral stabilization, cycling/running at 12 weeks;   4-5 months scapular stabilization, eccentric strengthening, plyometrics, endurance.      Per surgeon note on 12/14/22: Plan for return work now, but needs to avoid overhead activity and heavy lifting over 10 lbs until 3 months after surgery on the surgical arm.  Plan for return to throwing program to start at physical therapy at 3 months after surgery.    The following procedures were performed in the Right Shoulder:  Arthroscopic Shoulder Debridement - Extensive  "([id number])  Arthroscopic Posterior Labral Repair ([id number])  Arthroscopic Superior Labrum (SLAP) Repair ([id number])          Patient is 9 weeks post-op on 1/3/22 (surgery date 11/1/22)      SUBJECTIVE     Pt reports: he is sore in his legs from going to the gym 3x this week. Only did light exercises for arms and is only minimally sore in R shoulder  He was compliant with home exercise program.  Response to previous treatment: felt fine  Functional change: ongoing    Pain: 0/10  Location: right SLAP, rotator cuff debridement, posterior labral repair      OBJECTIVE      Objective measures taken at progress report unless specified otherwise.     TREATMENT       Long received the treatments listed below:       All billed as therex based on medicaid guidelines  therapeutic exercises to develop strength, endurance, ROM, flexibility, and posture for 40 minutes including:  Prayer stretch in stool with dowel 10x10s   sidelying external rotation with towel under arm with 2# 3x10  Sidelying flexion 2# 2x10   standing abduction 2# 2x10   Yellow med ball on wall ABCs, A-Z x2 each R   wall clocks (1, 3, and 5) with yellow theraband at wrist x5   prone Y,T,I 2x8  standing flexion and abduction in mirror with 2# 2x10  Seated Inferior capsule stretch LLLD 8# 5 mins     serratus wall slides with pillow case x15  Prone scap squeezes 20x3"  Plank in pushup position 2x30"  prone row to plane of body only 5# 3x8  +prone row with ER (8/10 pain with activity at 1/6/23 visit)  +Bilateral shoulder external rotation with yellow theraband 3x8  Shoulder external rotation with yellow theraband 3x8  Shoulder internal rotation with yellow theraband 3x8   shoulder PNF D2 (sword from pocket) with yellow theraband 2x8  [place] carry 10# x2 laps around gym   Push up with plus on the wall x15  Yellow med ball on wall up/down, side/side, cw, and ccw x20 each R   +wall clocks (1, 3, and 5) with yellow theraband at wrist x5   +serratus wall slides with pillow case " "x15  +supine lat stretch with 0# dowel 15x5"  supine perturbations (at wrist) with shoulder at 90 and elbow straight with eyes closed 3x30"         Discharged exercises:  Supine AROM shoulder flexion 2x10   seated external rotation with dowel 10x5"  Hands on wall at 90 alternating shoulder taps 2x10 B  standing AAROM abduction with dowel x10  shoulder rows with green theraband 3x10  shoulder extension to neutral with green theraband 3x10  tricep extension with purple theraband 3x10  shoulder external rotation walkouts with red theraband 2x8  shoulder internal rotation walkouts with red theraband 2x8     manual therapy techniques: Joint mobilizations and Soft tissue Mobilization were applied for 15 minutes, including:  passive ROM to tolerance (goal is full by 12 weeks)  Supine and prone scapular pin and stretch into shoulder abduction   Inferior mobs Grades 2-3   Anterior mobs Grades 1-2      neuromuscular re-education activities to improve: Balance, Coordination, Proprioception, and Posture for 0 minutes. The following activities were included:       PATIENT EDUCATION AND HOME EXERCISES     Home Exercises Provided and Patient Education Provided     Education provided:   - eduction on condition  -home exercise program     Written Home Exercises Provided: yes. Exercises were reviewed and Long was able to demonstrate them prior to the end of the session.  Long demonstrated good  understanding of the education provided. See EMR under Patient Instructions for exercises provided during therapy sessions.    ASSESSMENT     Long did well today progressions to active exercises with increased resistance. Added mobility drills of prayer stretch and abduction foam roller slides and issued this for HEP. Will cont to benefit from ROM and strengthening.     Long Is progressing towards his goals.   Pt prognosis is Good.     Pt will continue to benefit from skilled outpatient physical therapy to address the deficits listed in the " problem list box on initial evaluation, provide pt/family education and to maximize pt's level of independence in the home and community environment.     Pt's spiritual, cultural and educational needs considered and pt agreeable to plan of care and goals.     Anticipated barriers to physical therapy: scheduling    Goals:   Short Term Goals:  5 weeks  1.Report decreased R shoulder pain < / =  0/10  to increase tolerance for daily activity  2. Increase PROM to within protocol guidelines   3. Increased strength by 1/3 MMT grade in R UE to increase tolerance for ADL and work activities.  4. Pt to tolerate HEP to improve ROM and independence with ADL's     Long Term Goals: 15 weeks  1.Pt to like 20 lbs without increase in pain for return to work  2.Increase AROM to equal to left   3.Increase strength to >/= 4/5 in R UE to increase tolerance for ADL and work activities.  4. Pt goal: Pt to perform weighted overhead activities without increase in pain to be able to return to softball  5. Pt will have improved gcode of CJ (20-40% limited) on FOTO shoulder in order to demonstrate true functional improvement.     PLAN     Plan of care Certification: 11/9/2022 to 2/28/23.    Cont POC    Dada Saldivar PT

## 2023-01-12 NOTE — PROGRESS NOTES
[facility]   Physical Therapy Treatment Note     Name: Long Brunner  Clinic Number: [id number]    Therapy Diagnosis:   Encounter Diagnoses   Name Primary?    Decreased right shoulder range of motion Yes    Superior glenoid labrum lesion of right shoulder, subsequent encounter     Weakness of shoulder          Physician: Tj Fletcher IV, MD    Visit Date: 1/13/2023    Physician Orders: PT Eval and Treat   Medical Diagnosis from Referral: Superior glenoid labrum lesion of right shoulder, subsequent encounter [S43.431D]  Evaluation Date: 11/9/2022  Authorization Period Expiration: 4/12/23  Plan of Care Expiration: 2/28/23  Progress Note Due: 1/16/23  Visit # / Visits authorized: 4/10  FOTO: 4/3 EPISODE COMPLETED AND CLOSED  PTA Visit #: 0/5     Precautions: Standard, POST OP     Time In: 10:45 am  Time Out: 11:30 am  Total Billable Time: 45 minutes - TE 3 (medicaid)    ROM: start PROM at 3 weeks, limit flexion 90', IR 45', abdxn 90';    at 6 weeks begin active/active assisted ROM; passive ROM to tolerance, goal full ER, 135' flexion, 120' abduction;   at 12 weeks full ROM   Immobilizer: on at all times except hygiene and therapy for 6 weeks then d/c after 6 weeks   Exercises: 0-3 wks elbow/wrist  only;   3-6 wks begin PROM activities with posterior capsule mobilizations, closed chain scapula;   8 weeks begin deltoid/rotator cuff isometrics;   12 weeks, work on glenohumeral stabilization, cycling/running at 12 weeks;   4-5 months scapular stabilization, eccentric strengthening, plyometrics, endurance.      Per surgeon note on 12/14/22: Plan for return work now, but needs to avoid overhead activity and heavy lifting over 10 lbs until 3 months after surgery on the surgical arm.  Plan for return to throwing program to start at physical therapy at 3 months after surgery.    The following procedures were performed in the Right Shoulder:  Arthroscopic Shoulder Debridement - Extensive  "([id number])  Arthroscopic Posterior Labral Repair ([id number])  Arthroscopic Superior Labrum (SLAP) Repair ([id number])          Patient is 9 weeks post-op on 1/3/22 (surgery date 11/1/22)      SUBJECTIVE     Pt reports: doing fine today with no complaints  He was compliant with home exercise program.  Response to previous treatment: felt fine  Functional change: ongoing    Pain: 0/10  Location: right SLAP, rotator cuff debridement, posterior labral repair      OBJECTIVE      Objective measures taken at progress report unless specified otherwise.     TREATMENT       Long received the treatments listed below:       All billed as therex based on medicaid guidelines  therapeutic exercises to develop strength, endurance, ROM, flexibility, and posture for 30 minutes including:    Every minute on the minute for 9 mins:  Minute 1 - Prayer stretch in stool with dowel 10x10s   Minute 2 - supine lat stretch with 5# dowel   Minute 3 - Abduction with foam    Self IR mobilization with mob band 10x5s  Arm bars - 2x5 10# KB  Seated ER @ 90 +2# - 3x10         sidelying external rotation with towel under arm with 2# 3x10  Sidelying flexion 2# 2x10   standing abduction 2# 2x10   Yellow med ball on wall ABCs, A-Z x2 each R   wall clocks (1, 3, and 5) with yellow theraband at wrist x5   prone Y,T,I 2x8  standing flexion and abduction in mirror with 2# 2x10  Seated Inferior capsule stretch LLLD 8# 5 mins   serratus wall slides with pillow case x15  Prone scap squeezes 20x3"  Plank in pushup position 2x30"  prone row to plane of body only 5# 3x8  +prone row with ER (8/10 pain with activity at 1/6/23 visit)  +Bilateral shoulder external rotation with yellow theraband 3x8  Shoulder external rotation with yellow theraband 3x8  Shoulder internal rotation with yellow theraband 3x8   shoulder PNF D2 (sword from pocket) with yellow theraband 2x8  [place] carry 10# x2 laps around gym   Push up with plus on the wall x15  Yellow med ball on wall up/down, " "side/side, cw, and ccw x20 each R   +wall clocks (1, 3, and 5) with yellow theraband at wrist x5   +serratus wall slides with pillow case x15  supine perturbations (at wrist) with shoulder at 90 and elbow straight with eyes closed 3x30"         Discharged exercises:  Supine AROM shoulder flexion 2x10   seated external rotation with dowel 10x5"  Hands on wall at 90 alternating shoulder taps 2x10 B  standing AAROM abduction with dowel x10  shoulder rows with green theraband 3x10  shoulder extension to neutral with green theraband 3x10  tricep extension with purple theraband 3x10  shoulder external rotation walkouts with red theraband 2x8  shoulder internal rotation walkouts with red theraband 2x8     manual therapy techniques: Joint mobilizations and Soft tissue Mobilization were applied for 15 minutes, including:  passive ROM to tolerance (goal is full by 12 weeks)  Posterior, Inferior, anterior  mobs Grades 3-4  GH lateral distraction with mob belt Grade 3 sustained hold   AC jt mob   Supine and prone scapular pin and stretch into shoulder abduction        neuromuscular re-education activities to improve: Balance, Coordination, Proprioception, and Posture for 0 minutes. The following activities were included:       PATIENT EDUCATION AND HOME EXERCISES     Home Exercises Provided and Patient Education Provided     Education provided:   - eduction on condition  -home exercise program     Written Home Exercises Provided: yes. Exercises were reviewed and Long was able to demonstrate them prior to the end of the session.  Alves demonstrated good  understanding of the education provided. See EMR under Patient Instructions for exercises provided during therapy sessions.    ASSESSMENT     Today we focused on improving shoulder mobility adding several manual techniques and self mobilizations. All were tolerated well and ROM improved after the session. Also added arm bars to progress shoulder stability and ER @ 90 degrees to " "improve strength in the "throwers" position. Continues to respond well to PT.     Long Is progressing towards his goals.   Pt prognosis is Good.     Pt will continue to benefit from skilled outpatient physical therapy to address the deficits listed in the problem list box on initial evaluation, provide pt/family education and to maximize pt's level of independence in the home and community environment.     Pt's spiritual, cultural and educational needs considered and pt agreeable to plan of care and goals.     Anticipated barriers to physical therapy: scheduling    Goals:   Short Term Goals:  5 weeks  1.Report decreased R shoulder pain < / =  0/10  to increase tolerance for daily activity  2. Increase PROM to within protocol guidelines   3. Increased strength by 1/3 MMT grade in R UE to increase tolerance for ADL and work activities.  4. Pt to tolerate HEP to improve ROM and independence with ADL's     Long Term Goals: 15 weeks  1.Pt to like 20 lbs without increase in pain for return to work  2.Increase AROM to equal to left   3.Increase strength to >/= 4/5 in R UE to increase tolerance for ADL and work activities.  4. Pt goal: Pt to perform weighted overhead activities without increase in pain to be able to return to softball  5. Pt will have improved gcode of CJ (20-40% limited) on FOTO shoulder in order to demonstrate true functional improvement.     PLAN     Plan of care Certification: 11/9/2022 to 2/28/23.    Cont POC    Dada Saldivar, PT                                              "

## 2023-01-13 ENCOUNTER — CLINICAL SUPPORT (OUTPATIENT)
Dept: REHABILITATION | Facility: HOSPITAL | Age: 23
End: 2023-01-13
Attending: ORTHOPAEDIC SURGERY
Payer: MEDICAID

## 2023-01-13 DIAGNOSIS — R29.898 WEAKNESS OF SHOULDER: ICD-10-CM

## 2023-01-13 DIAGNOSIS — S43.431D SUPERIOR GLENOID LABRUM LESION OF RIGHT SHOULDER, SUBSEQUENT ENCOUNTER: ICD-10-CM

## 2023-01-13 DIAGNOSIS — M25.611 DECREASED RIGHT SHOULDER RANGE OF MOTION: Primary | ICD-10-CM

## 2023-01-13 PROCEDURE — 97110 THERAPEUTIC EXERCISES: CPT | Mod: PO

## 2023-01-19 ENCOUNTER — CLINICAL SUPPORT (OUTPATIENT)
Dept: REHABILITATION | Facility: HOSPITAL | Age: 23
End: 2023-01-19
Attending: ORTHOPAEDIC SURGERY
Payer: MEDICAID

## 2023-01-19 DIAGNOSIS — R29.898 WEAKNESS OF SHOULDER: ICD-10-CM

## 2023-01-19 DIAGNOSIS — S43.431D SUPERIOR GLENOID LABRUM LESION OF RIGHT SHOULDER, SUBSEQUENT ENCOUNTER: ICD-10-CM

## 2023-01-19 DIAGNOSIS — M25.611 DECREASED RIGHT SHOULDER RANGE OF MOTION: Primary | ICD-10-CM

## 2023-01-19 PROCEDURE — 97110 THERAPEUTIC EXERCISES: CPT | Mod: PO

## 2023-01-19 NOTE — PROGRESS NOTES
[facility]   Physical Therapy Treatment Note / Reassessment    Name: Long Brunner  Clinic Number: [id number]    Therapy Diagnosis:   Encounter Diagnoses   Name Primary?    Decreased right shoulder range of motion Yes    Superior glenoid labrum lesion of right shoulder, subsequent encounter     Weakness of shoulder            Physician: Tj Fletcher IV, MD    Visit Date: 1/19/2023    Physician Orders: PT Eval and Treat   Medical Diagnosis from Referral: Superior glenoid labrum lesion of right shoulder, subsequent encounter [S43.431D]  Evaluation Date: 11/9/2022  Authorization Period Expiration: 4/12/23  Plan of Care Expiration: 2/28/23  Progress Note Due: 2/19/23  Visit # / Visits authorized: 5/10  FOTO: 4/3 EPISODE COMPLETED AND CLOSED  PTA Visit #: 0/5     Precautions: Standard, POST OP     Time In: 8:45 am  Time Out: 9:33 am  Total Billable Time: 48 minutes - TE 3 (medicaid)    ROM: start PROM at 3 weeks, limit flexion 90', IR 45', abdxn 90';    at 6 weeks begin active/active assisted ROM; passive ROM to tolerance, goal full ER, 135' flexion, 120' abduction;   at 12 weeks full ROM   Immobilizer: on at all times except hygiene and therapy for 6 weeks then d/c after 6 weeks   Exercises: 0-3 wks elbow/wrist  only;   3-6 wks begin PROM activities with posterior capsule mobilizations, closed chain scapula;   8 weeks begin deltoid/rotator cuff isometrics;   12 weeks, work on glenohumeral stabilization, cycling/running at 12 weeks;   4-5 months scapular stabilization, eccentric strengthening, plyometrics, endurance.      Per surgeon note on 12/14/22: Plan for return work now, but needs to avoid overhead activity and heavy lifting over 10 lbs until 3 months after surgery on the surgical arm.  Plan for return to throwing program to start at physical therapy at 3 months after surgery.    The following procedures were performed in the Right Shoulder:  Arthroscopic Shoulder Debridement -  "Extensive ([id number])  Arthroscopic Posterior Labral Repair ([id number])  Arthroscopic Superior Labrum (SLAP) Repair ([id number])          Patient is 9 weeks post-op on 1/3/22 (surgery date 11/1/22)      SUBJECTIVE     Pt reports: doing fine today with no complaints  He was compliant with home exercise program.  Response to previous treatment: felt fine  Functional change: ongoing    Pain: 0/10  Location: right SLAP, rotator cuff debridement, posterior labral repair      OBJECTIVE     Active Range of Motion:   Shoulder Right Left   Flexion 165 170   Abduction 170 180   ER at 0 52 NT   ER at 90 56 78   IR 44 To stomach at 0   Reach behind head yes yes   Reach behind back  Yes, with some trouble yes      Strength:  Shoulder Right Left   Flexion NT 5/5   Abduction  NT 5/5   ER at 0 NT 5/5   INTERNAL ROTATION at 0 NT 4+/5      Joint Mobility: remains limited post operatively       TREATMENT       Long received the treatments listed below:       All billed as therex based on medicaid guidelines  therapeutic exercises to develop strength, endurance, ROM, flexibility, and posture for 35 minutes including:    Every minute on the minute for 9 mins:  Minute 1 - Prayer stretch in stool with dowel 10x10s   Minute 2 - supine lat stretch with 5# dowel   Minute 3 - Abduction with foam    Self IR mobilization with mob band 10x5s  Arm bars - 2x5 10# KB  Seated ER @ 90 +2# - 3x10         sidelying external rotation with towel under arm with 2# 3x10  Sidelying flexion 2# 2x10   standing abduction 2# 2x10   Yellow med ball on wall ABCs, A-Z x2 each R   wall clocks (1, 3, and 5) with yellow theraband at wrist x5   prone Y,T,I 2x8  standing flexion and abduction in mirror with 2# 2x10  Seated Inferior capsule stretch LLLD 8# 5 mins   serratus wall slides with pillow case x15  Prone scap squeezes 20x3"  Plank in pushup position 2x30"  prone row to plane of body only 5# 3x8  +prone row with ER (8/10 pain with activity at 1/6/23 visit)  +Bilateral " "shoulder external rotation with yellow theraband 3x8  Shoulder external rotation with yellow theraband 3x8  Shoulder internal rotation with yellow theraband 3x8   shoulder PNF D2 (sword from pocket) with yellow theraband 2x8  [place] carry 10# x2 laps around gym   Push up with plus on the wall x15  Yellow med ball on wall up/down, side/side, cw, and ccw x20 each R   +wall clocks (1, 3, and 5) with yellow theraband at wrist x5   +serratus wall slides with pillow case x15  supine perturbations (at wrist) with shoulder at 90 and elbow straight with eyes closed 3x30"         Discharged exercises:  Supine AROM shoulder flexion 2x10   seated external rotation with dowel 10x5"  Hands on wall at 90 alternating shoulder taps 2x10 B  standing AAROM abduction with dowel x10  shoulder rows with green theraband 3x10  shoulder extension to neutral with green theraband 3x10  tricep extension with purple theraband 3x10  shoulder external rotation walkouts with red theraband 2x8  shoulder internal rotation walkouts with red theraband 2x8     manual therapy techniques: Joint mobilizations and Soft tissue Mobilization were applied for 10 minutes, including:  passive ROM to tolerance (goal is full by 12 weeks)  Posterior, Inferior, anterior  mobs Grades 3-4  GH lateral distraction with mob belt Grade 3 sustained hold   AC jt mob   Supine and prone scapular pin and stretch into shoulder abduction        neuromuscular re-education activities to improve: Balance, Coordination, Proprioception, and Posture for 0 minutes. The following activities were included:       PATIENT EDUCATION AND HOME EXERCISES     Home Exercises Provided and Patient Education Provided     Education provided:   - eduction on condition  -home exercise program     Written Home Exercises Provided: yes. Exercises were reviewed and Long was able to demonstrate them prior to the end of the session.  Alves demonstrated good  understanding of the education provided. See EMR " under Patient Instructions for exercises provided during therapy sessions.    ASSESSMENT     Reassessment reveals improvement in pain free active range of motion in all planes. Good strength at stable arm positions but will require additional strengthening with shoulder elevation. Long is tolerating mobility drills very well. Will continue to progress strength and range of motion as tolerated.     Long Is progressing towards his goals.   Pt prognosis is Good.     Pt will continue to benefit from skilled outpatient physical therapy to address the deficits listed in the problem list box on initial evaluation, provide pt/family education and to maximize pt's level of independence in the home and community environment.     Pt's spiritual, cultural and educational needs considered and pt agreeable to plan of care and goals.     Anticipated barriers to physical therapy: scheduling    Goals:   Short Term Goals:  5 weeks   1.Report decreased R shoulder pain < / =  0/10  to increase tolerance for daily activity MET  2. Increase PROM to within protocol guidelines   3. Increased strength by 1/3 MMT grade in R UE to increase tolerance for ADL and work activities. MET  4. Pt to tolerate HEP to improve ROM and independence with ADL's     Long Term Goals: 15 weeks  1.Pt to like 20 lbs without increase in pain for return to work  2.Increase AROM to equal to left   3.Increase strength to >/= 4/5 in R UE to increase tolerance for ADL and work activities.  4. Pt goal: Pt to perform weighted overhead activities without increase in pain to be able to return to softball  5. Pt will have improved gcode of CJ (20-40% limited) on FOTO shoulder in order to demonstrate true functional improvement.     PLAN     Plan of care Certification: 11/9/2022 to 2/28/23.    Cont POC    Alicia yL, PT

## 2023-01-25 ENCOUNTER — CLINICAL SUPPORT (OUTPATIENT)
Dept: REHABILITATION | Facility: HOSPITAL | Age: 23
End: 2023-01-25
Attending: ORTHOPAEDIC SURGERY
Payer: MEDICAID

## 2023-01-25 DIAGNOSIS — M25.611 DECREASED RIGHT SHOULDER RANGE OF MOTION: Primary | ICD-10-CM

## 2023-01-25 DIAGNOSIS — R29.898 WEAKNESS OF SHOULDER: ICD-10-CM

## 2023-01-25 DIAGNOSIS — S43.431D SUPERIOR GLENOID LABRUM LESION OF RIGHT SHOULDER, SUBSEQUENT ENCOUNTER: ICD-10-CM

## 2023-01-25 PROCEDURE — 97110 THERAPEUTIC EXERCISES: CPT | Mod: PO

## 2023-01-25 NOTE — PROGRESS NOTES
[facility]   Physical Therapy Treatment Note    Name: Long Brunner  Clinic Number: [id number]    Therapy Diagnosis:   Encounter Diagnoses   Name Primary?    Decreased right shoulder range of motion Yes    Superior glenoid labrum lesion of right shoulder, subsequent encounter     Weakness of shoulder              Physician: Tj Fletcher IV, MD    Visit Date: 1/25/2023    Physician Orders: PT Eval and Treat   Medical Diagnosis from Referral: Superior glenoid labrum lesion of right shoulder, subsequent encounter [S43.431D]  Evaluation Date: 11/9/2022  Authorization Period Expiration: 4/12/23  Plan of Care Expiration: 2/28/23  Progress Note Due: 2/19/23  Visit # / Visits authorized: 6/10  FOTO: 4/3 EPISODE COMPLETED AND CLOSED  PTA Visit #: 0/5     Precautions: Standard, POST OP     Time In: 8:45 am  Time Out: 9:33 am  Total Billable Time: 48 minutes - TE 3 (medicaid)    ROM: start PROM at 3 weeks, limit flexion 90', IR 45', abdxn 90';    at 6 weeks begin active/active assisted ROM; passive ROM to tolerance, goal full ER, 135' flexion, 120' abduction;   at 12 weeks full ROM   Immobilizer: on at all times except hygiene and therapy for 6 weeks then d/c after 6 weeks   Exercises: 0-3 wks elbow/wrist  only;   3-6 wks begin PROM activities with posterior capsule mobilizations, closed chain scapula;   8 weeks begin deltoid/rotator cuff isometrics;   12 weeks, work on glenohumeral stabilization, cycling/running at 12 weeks;   4-5 months scapular stabilization, eccentric strengthening, plyometrics, endurance.      Per surgeon note on 12/14/22: Plan for return work now, but needs to avoid overhead activity and heavy lifting over 10 lbs until 3 months after surgery on the surgical arm.  Plan for return to throwing program to start at physical therapy at 3 months after surgery.    The following procedures were performed in the Right Shoulder:  Arthroscopic Shoulder Debridement - Extensive  "([id number])  Arthroscopic Posterior Labral Repair ([id number])  Arthroscopic Superior Labrum (SLAP) Repair ([id number])          Patient is 9 weeks post-op on 1/3/22 (surgery date 11/1/22)      SUBJECTIVE     Pt reports: doing fine today with no complaints  He was compliant with home exercise program.  Response to previous treatment: felt fine  Functional change: ongoing    Pain: 0/10  Location: right SLAP, rotator cuff debridement, posterior labral repair      OBJECTIVE     NP    TREATMENT       Long received the treatments listed below:       All billed as therex based on medicaid guidelines  therapeutic exercises to develop strength, endurance, ROM, flexibility, and posture for 30 minutes including:    Self IR mobilization with mob band 10x5s    Every minute on the minute for 9 mins:  Minute 1 - Prayer stretch in stool with dowel 10x10s   Minute 2 - supine lat stretch with 5# dowel   Minute 3 - Abduction with foam at wall     Prone on Physio I's, T's, Y's x10 ea   Cylinder on wall with TB resistance - x10ea   1/2 kneeling punch reverse 5# KB and RTB - 2x10   Seated ER @ 90 +4# - 3x10         sidelying external rotation with towel under arm with 2# 3x10  Sidelying flexion 2# 2x10   standing abduction 2# 2x10   Yellow med ball on wall ABCs, A-Z x2 each R   wall clocks (1, 3, and 5) with yellow theraband at wrist x5   prone Y,T,I 2x8  standing flexion and abduction in mirror with 2# 2x10  Seated Inferior capsule stretch LLLD 8# 5 mins   serratus wall slides with pillow case x15  Prone scap squeezes 20x3"  Plank in pushup position 2x30"  prone row to plane of body only 5# 3x8  +prone row with ER (8/10 pain with activity at 1/6/23 visit)  +Bilateral shoulder external rotation with yellow theraband 3x8  Shoulder external rotation with yellow theraband 3x8  Shoulder internal rotation with yellow theraband 3x8   shoulder PNF D2 (sword from pocket) with yellow theraband 2x8  [place] carry 10# x2 laps around gym   Push up with plus on the " "wall x15  Yellow med ball on wall up/down, side/side, cw, and ccw x20 each R   +wall clocks (1, 3, and 5) with yellow theraband at wrist x5   +serratus wall slides with pillow case x15  supine perturbations (at wrist) with shoulder at 90 and elbow straight with eyes closed 3x30"         Discharged exercises:  Supine AROM shoulder flexion 2x10   seated external rotation with dowel 10x5"  Hands on wall at 90 alternating shoulder taps 2x10 B  standing AAROM abduction with dowel x10  shoulder rows with green theraband 3x10  shoulder extension to neutral with green theraband 3x10  tricep extension with purple theraband 3x10  shoulder external rotation walkouts with red theraband 2x8  shoulder internal rotation walkouts with red theraband 2x8     manual therapy techniques: Joint mobilizations and Soft tissue Mobilization were applied for 15 minutes, including:  passive ROM to tolerance (goal is full by 12 weeks)  Posterior, Inferior, anterior  mobs Grades 3-4  GH lateral distraction with mob belt Grade 3 sustained hold   AC jt mob   Supine and prone scapular pin and stretch into shoulder abduction        neuromuscular re-education activities to improve: Balance, Coordination, Proprioception, and Posture for 0 minutes. The following activities were included:       PATIENT EDUCATION AND HOME EXERCISES     Home Exercises Provided and Patient Education Provided     Education provided:   - eduction on condition  -home exercise program     Written Home Exercises Provided: yes. Exercises were reviewed and Long was able to demonstrate them prior to the end of the session.  Long demonstrated good  understanding of the education provided. See EMR under Patient Instructions for exercises provided during therapy sessions.    ASSESSMENT     Long continues to respond well to manual therapy and there-ex. Functional internal rotation appears to be improving overall. He continues to progress each visit with strengthening exercises as " well.     Long Is progressing towards his goals.   Pt prognosis is Good.     Pt will continue to benefit from skilled outpatient physical therapy to address the deficits listed in the problem list box on initial evaluation, provide pt/family education and to maximize pt's level of independence in the home and community environment.     Pt's spiritual, cultural and educational needs considered and pt agreeable to plan of care and goals.     Anticipated barriers to physical therapy: scheduling    Goals:   Short Term Goals:  5 weeks   1.Report decreased R shoulder pain < / =  0/10  to increase tolerance for daily activity MET  2. Increase PROM to within protocol guidelines   3. Increased strength by 1/3 MMT grade in R UE to increase tolerance for ADL and work activities. MET  4. Pt to tolerate HEP to improve ROM and independence with ADL's     Long Term Goals: 15 weeks  1.Pt to like 20 lbs without increase in pain for return to work  2.Increase AROM to equal to left   3.Increase strength to >/= 4/5 in R UE to increase tolerance for ADL and work activities.  4. Pt goal: Pt to perform weighted overhead activities without increase in pain to be able to return to softball  5. Pt will have improved gcode of CJ (20-40% limited) on FOTO shoulder in order to demonstrate true functional improvement.     PLAN     Plan of care Certification: 11/9/2022 to 2/28/23.    Cont POC    Dada Saldivar PT

## 2023-01-27 ENCOUNTER — CLINICAL SUPPORT (OUTPATIENT)
Dept: REHABILITATION | Facility: HOSPITAL | Age: 23
End: 2023-01-27
Attending: ORTHOPAEDIC SURGERY
Payer: MEDICAID

## 2023-01-27 DIAGNOSIS — M25.611 DECREASED RIGHT SHOULDER RANGE OF MOTION: Primary | ICD-10-CM

## 2023-01-27 DIAGNOSIS — R29.898 WEAKNESS OF SHOULDER: ICD-10-CM

## 2023-01-27 DIAGNOSIS — S43.431D SUPERIOR GLENOID LABRUM LESION OF RIGHT SHOULDER, SUBSEQUENT ENCOUNTER: ICD-10-CM

## 2023-01-27 PROCEDURE — 97110 THERAPEUTIC EXERCISES: CPT | Mod: PO,CQ

## 2023-01-27 NOTE — PROGRESS NOTES
[facility]   Physical Therapy Treatment Note    Name: Long Brunner  Clinic Number: [id number]    Therapy Diagnosis:   Encounter Diagnoses   Name Primary?    Decreased right shoulder range of motion Yes    Superior glenoid labrum lesion of right shoulder, subsequent encounter     Weakness of shoulder        Physician: Tj Fletcher IV, MD    Visit Date: 1/27/2023    Physician Orders: PT Eval and Treat   Medical Diagnosis from Referral: Superior glenoid labrum lesion of right shoulder, subsequent encounter [S43.431D]  Evaluation Date: 11/9/2022  Authorization Period Expiration: 4/12/23  Plan of Care Expiration: 2/28/23  Progress Note Due: 2/19/23  Visit # / Visits authorized: 6/10  FOTO: 4/3 EPISODE COMPLETED AND CLOSED  PTA Visit #: 0/5     Precautions: Standard, POST OP  (surgery date 11/1/22)    Time In: 10:00 am  Time Out: 10:44 am  Total Billable Time: 44 minutes - TE 3 (medicaid)    ROM: start PROM at 3 weeks, limit flexion 90', IR 45', abdxn 90';    at 6 weeks begin active/active assisted ROM; passive ROM to tolerance, goal full ER, 135' flexion, 120' abduction;   at 12 weeks full ROM   Immobilizer: on at all times except hygiene and therapy for 6 weeks then d/c after 6 weeks   Exercises: 0-3 wks elbow/wrist  only;   3-6 wks begin PROM activities with posterior capsule mobilizations, closed chain scapula;   8 weeks begin deltoid/rotator cuff isometrics;   12 weeks, work on glenohumeral stabilization, cycling/running at 12 weeks;   4-5 months scapular stabilization, eccentric strengthening, plyometrics, endurance.      Per surgeon note on 12/14/22: Plan for return work now, but needs to avoid overhead activity and heavy lifting over 10 lbs until 3 months after surgery on the surgical arm.  Plan for return to throwing program to start at physical therapy at 3 months after surgery.    The following procedures were performed in the Right Shoulder:  Arthroscopic Shoulder Debridement -  "Extensive (09680)  Arthroscopic Posterior Labral Repair ([id number])  Arthroscopic Superior Labrum (SLAP) Repair ([id number])         SUBJECTIVE     Pt reports: he's happy with his ROM with the exception of reaching into FIR behind his back.   He was compliant with home exercise program.  Response to previous treatment: felt fine  Functional change: ongoing    Pain: 0/10  Location: right SLAP, rotator cuff debridement, posterior labral repair    OBJECTIVE     NP    TREATMENT      Alves received the treatments listed below:    Bold = performed     All billed as therex based on medicaid guidelines  therapeutic exercises to develop strength, endurance, ROM, flexibility, and posture for 30 minutes including:    Self IR mobilization with mob band 10x5s    Every minute on the minute for 9 mins:  Minute 1 - Prayer stretch in stool with dowel 10x10s   Minute 2 - supine lat stretch with 5# dowel   Minute 3 - Abduction with foam at wall       Yellow med ball on wall ABCs, A-Z x2 each R   IR/ER @ 90 degrees with cables 2x10 7# both ways  wall clocks (1, 3, and 5) with yellow theraband at wrist 2x5   Push up with plus on the wall x15  shoulder PNF D2 (sword from pocket) with yellow theraband 2x8  prone Y,T,I 2# 2x8    D1/D2 Flex and ext   ER stretch on wall   [place] on wall with TB resistance - x10ea   Shoulder to overhead punches: Ant, lateral, Same side rotation -   sidelying external rotation with towel under arm with 2# 3x10  Sidelying flexion 2# 2x10   standing abduction 2# 2x10   standing flexion and abduction in mirror with 2# 2x10  Seated Inferior capsule stretch LLLD 8# 5 mins   serratus wall slides with pillow case x15  Prone scap squeezes 20x3"  Plank in pushup position 2x30"  prone row to plane of body only 5# 3x8  +prone row with ER (8/10 pain with activity at 1/6/23 visit)  +Bilateral shoulder external rotation with yellow theraband 3x8  Shoulder external rotation with yellow theraband 3x8  Shoulder internal rotation with " "yellow theraband 3x8   [place] carry 10# x2 laps around gym   Yellow med ball on wall up/down, side/side, cw, and ccw x20 each R   +serratus wall slides with pillow case x15  supine perturbations (at wrist) with shoulder at 90 and elbow straight with eyes closed 3x30"       Discharged exercises:  Supine AROM shoulder flexion 2x10   seated external rotation with dowel 10x5"  Hands on wall at 90 alternating shoulder taps 2x10 B  standing AAROM abduction with dowel x10  shoulder rows with green theraband 3x10  shoulder extension to neutral with green theraband 3x10  tricep extension with purple theraband 3x10  shoulder external rotation walkouts with red theraband 2x8  shoulder internal rotation walkouts with red theraband 2x8     manual therapy techniques: Joint mobilizations and Soft tissue Mobilization were applied for 15 minutes, including:  passive ROM to tolerance (goal is full by 12 weeks)  Posterior, Inferior, anterior  mobs Grades 3-4  GH lateral distraction with mob belt Grade 3 sustained hold   AC jt mob   Supine and prone scapular pin and stretch into shoulder abduction      neuromuscular re-education activities to improve: Balance, Coordination, Proprioception, and Posture for 0 minutes. The following activities were included:       PATIENT EDUCATION AND HOME EXERCISES     Home Exercises Provided and Patient Education Provided     Education provided:   - eduction on condition  - home exercise program     Written Home Exercises Provided: yes. Exercises were reviewed and Alves was able to demonstrate them prior to the end of the session.  Long demonstrated good  understanding of the education provided. See EMR under Patient Instructions for exercises provided during therapy sessions.    ASSESSMENT   Patient responds well to his progressive strengthening and stability program as we work towards a return to his PLOF. He demonstrates increasing muscular fatigue throughout his visit, but without adverse affects " upon completion. Mild end range tightness throughout all planes present with manual treatment in which he would benefit from continuation of skilled treatment to maximize his flexibility.     Long is progressing towards his goals.   Pt prognosis is Good.     Pt will continue to benefit from skilled outpatient physical therapy to address the deficits listed in the problem list box on initial evaluation, provide pt/family education and to maximize pt's level of independence in the home and community environment.     Pt's spiritual, cultural and educational needs considered and pt agreeable to plan of care and goals.     Anticipated barriers to physical therapy: scheduling    Goals:   Short Term Goals:  5 weeks   1.Report decreased R shoulder pain < / =  0/10  to increase tolerance for daily activity MET  2. Increase PROM to within protocol guidelines   3. Increased strength by 1/3 MMT grade in R UE to increase tolerance for ADL and work activities. MET  4. Pt to tolerate HEP to improve ROM and independence with ADL's     Long Term Goals: 15 weeks  1.Pt to like 20 lbs without increase in pain for return to work  2.Increase AROM to equal to left   3.Increase strength to >/= 4/5 in R UE to increase tolerance for ADL and work activities.  4. Pt goal: Pt to perform weighted overhead activities without increase in pain to be able to return to softball  5. Pt will have improved gcode of CJ (20-40% limited) on FOTO shoulder in order to demonstrate true functional improvement.     PLAN     Plan of care Certification: 11/9/2022 to 2/28/23.    Cont POC    Luz Maria Kwong, PTA

## 2023-01-27 NOTE — PROGRESS NOTES
[facility]   Physical Therapy Treatment Note    Name: Long Brunner  Clinic Number: [id number]    Therapy Diagnosis:   No diagnosis found.            Physician: Tj Fletcher IV, MD    Visit Date: 1/27/2023    Physician Orders: PT Eval and Treat   Medical Diagnosis from Referral: Superior glenoid labrum lesion of right shoulder, subsequent encounter [S43.431D]  Evaluation Date: 11/9/2022  Authorization Period Expiration: 4/12/23  Plan of Care Expiration: 2/28/23  Progress Note Due: 2/19/23  Visit # / Visits authorized: 6/10  FOTO: 4/3 EPISODE COMPLETED AND CLOSED  PTA Visit #: 0/5     Precautions: Standard, POST OP     Time In: 8:45 am  Time Out: 9:33 am  Total Billable Time: 48 minutes - TE 3 (medicaid)    ROM: start PROM at 3 weeks, limit flexion 90', IR 45', abdxn 90';    at 6 weeks begin active/active assisted ROM; passive ROM to tolerance, goal full ER, 135' flexion, 120' abduction;   at 12 weeks full ROM   Immobilizer: on at all times except hygiene and therapy for 6 weeks then d/c after 6 weeks   Exercises: 0-3 wks elbow/wrist  only;   3-6 wks begin PROM activities with posterior capsule mobilizations, closed chain scapula;   8 weeks begin deltoid/rotator cuff isometrics;   12 weeks, work on glenohumeral stabilization, cycling/running at 12 weeks;   4-5 months scapular stabilization, eccentric strengthening, plyometrics, endurance.      Per surgeon note on 12/14/22: Plan for return work now, but needs to avoid overhead activity and heavy lifting over 10 lbs until 3 months after surgery on the surgical arm.  Plan for return to throwing program to start at physical therapy at 3 months after surgery.    The following procedures were performed in the Right Shoulder:  Arthroscopic Shoulder Debridement - Extensive (13670)  Arthroscopic Posterior Labral Repair ([id number])  Arthroscopic Superior Labrum (SLAP) Repair ([id number])          Patient is 9 weeks post-op on 1/3/22 (surgery date  "11/1/22)      SUBJECTIVE     Pt reports: doing fine today with no complaints  He was compliant with home exercise program.  Response to previous treatment: felt fine  Functional change: ongoing    Pain: 0/10  Location: right SLAP, rotator cuff debridement, posterior labral repair      OBJECTIVE     NP    TREATMENT       Long received the treatments listed below:       All billed as therex based on medicaid guidelines  therapeutic exercises to develop strength, endurance, ROM, flexibility, and posture for 30 minutes including:    Self IR mobilization with mob band 10x5s  ER stretch on wall     Every minute on the minute for 9 mins:  Minute 1 - Prayer stretch in stool with dowel 10x10s   Minute 2 - supine lat stretch with 5# dowel   Minute 3 - Abduction with foam at wall     [place] on wall with TB resistance - x10ea   IR/ER @ 90 degrees with cables   Shoulder to overhead punches: Ant, lateral, Same side rotation -   D1/D2 Flex and ext           sidelying external rotation with towel under arm with 2# 3x10  Sidelying flexion 2# 2x10   standing abduction 2# 2x10   Yellow med ball on wall ABCs, A-Z x2 each R   wall clocks (1, 3, and 5) with yellow theraband at wrist x5   prone Y,T,I 2x8  standing flexion and abduction in mirror with 2# 2x10  Seated Inferior capsule stretch LLLD 8# 5 mins   serratus wall slides with pillow case x15  Prone scap squeezes 20x3"  Plank in pushup position 2x30"  prone row to plane of body only 5# 3x8  +prone row with ER (8/10 pain with activity at 1/6/23 visit)  +Bilateral shoulder external rotation with yellow theraband 3x8  Shoulder external rotation with yellow theraband 3x8  Shoulder internal rotation with yellow theraband 3x8   shoulder PNF D2 (sword from pocket) with yellow theraband 2x8  [place] carry 10# x2 laps around gym   Push up with plus on the wall x15  Yellow med ball on wall up/down, side/side, cw, and ccw x20 each R   +wall clocks (1, 3, and 5) with yellow theraband at wrist " "x5   +serratus wall slides with pillow case x15  supine perturbations (at wrist) with shoulder at 90 and elbow straight with eyes closed 3x30"         Discharged exercises:  Supine AROM shoulder flexion 2x10   seated external rotation with dowel 10x5"  Hands on wall at 90 alternating shoulder taps 2x10 B  standing AAROM abduction with dowel x10  shoulder rows with green theraband 3x10  shoulder extension to neutral with green theraband 3x10  tricep extension with purple theraband 3x10  shoulder external rotation walkouts with red theraband 2x8  shoulder internal rotation walkouts with red theraband 2x8     manual therapy techniques: Joint mobilizations and Soft tissue Mobilization were applied for 15 minutes, including:  passive ROM to tolerance (goal is full by 12 weeks)  Posterior, Inferior, anterior  mobs Grades 3-4  GH lateral distraction with mob belt Grade 3 sustained hold   AC jt mob   Supine and prone scapular pin and stretch into shoulder abduction        neuromuscular re-education activities to improve: Balance, Coordination, Proprioception, and Posture for 0 minutes. The following activities were included:       PATIENT EDUCATION AND HOME EXERCISES     Home Exercises Provided and Patient Education Provided     Education provided:   - eduction on condition  -home exercise program     Written Home Exercises Provided: yes. Exercises were reviewed and Logn was able to demonstrate them prior to the end of the session.  Long demonstrated good  understanding of the education provided. See EMR under Patient Instructions for exercises provided during therapy sessions.    ASSESSMENT     Long continues to respond well to manual therapy and there-ex. Functional internal rotation appears to be improving overall. He continues to progress each visit with strengthening exercises as well.     Long Is progressing towards his goals.   Pt prognosis is Good.     Pt will continue to benefit from skilled outpatient physical " therapy to address the deficits listed in the problem list box on initial evaluation, provide pt/family education and to maximize pt's level of independence in the home and community environment.     Pt's spiritual, cultural and educational needs considered and pt agreeable to plan of care and goals.     Anticipated barriers to physical therapy: scheduling    Goals:   Short Term Goals:  5 weeks   1.Report decreased R shoulder pain < / =  0/10  to increase tolerance for daily activity MET  2. Increase PROM to within protocol guidelines   3. Increased strength by 1/3 MMT grade in R UE to increase tolerance for ADL and work activities. MET  4. Pt to tolerate HEP to improve ROM and independence with ADL's     Long Term Goals: 15 weeks  1.Pt to like 20 lbs without increase in pain for return to work  2.Increase AROM to equal to left   3.Increase strength to >/= 4/5 in R UE to increase tolerance for ADL and work activities.  4. Pt goal: Pt to perform weighted overhead activities without increase in pain to be able to return to softball  5. Pt will have improved gcode of CJ (20-40% limited) on FOTO shoulder in order to demonstrate true functional improvement.     PLAN     Plan of care Certification: 11/9/2022 to 2/28/23.    Cont POC    Dada Saldivar, PT

## 2023-02-01 ENCOUNTER — CLINICAL SUPPORT (OUTPATIENT)
Dept: REHABILITATION | Facility: HOSPITAL | Age: 23
End: 2023-02-01
Attending: ORTHOPAEDIC SURGERY
Payer: MEDICAID

## 2023-02-01 DIAGNOSIS — S43.431D SUPERIOR GLENOID LABRUM LESION OF RIGHT SHOULDER, SUBSEQUENT ENCOUNTER: ICD-10-CM

## 2023-02-01 DIAGNOSIS — R29.898 WEAKNESS OF SHOULDER: ICD-10-CM

## 2023-02-01 DIAGNOSIS — M25.611 DECREASED RIGHT SHOULDER RANGE OF MOTION: Primary | ICD-10-CM

## 2023-02-01 PROCEDURE — 97110 THERAPEUTIC EXERCISES: CPT | Mod: PO

## 2023-02-01 NOTE — PROGRESS NOTES
[facility]   Physical Therapy Treatment Note    Name: Long Brunner  Clinic Number: [id number]    Therapy Diagnosis:   Encounter Diagnoses   Name Primary?    Decreased right shoulder range of motion Yes    Superior glenoid labrum lesion of right shoulder, subsequent encounter     Weakness of shoulder        Physician: Tj Fletcher IV, MD    Visit Date: 2/1/2023    Physician Orders: PT Eval and Treat   Medical Diagnosis from Referral: Superior glenoid labrum lesion of right shoulder, subsequent encounter [S43.431D]  Evaluation Date: 11/9/2022  Authorization Period Expiration: 4/12/23  Plan of Care Expiration: 2/28/23  Progress Note Due: 2/19/23  Visit # / Visits authorized: 8/10  FOTO: 4/3 EPISODE COMPLETED AND CLOSED  PTA Visit #: 0/5     Precautions: Standard, POST OP  (surgery date 11/1/22)    Time In: 11:30 am  Time Out: 12:30 pm  Total Billable Time: 60 minutes - TE 4 (medicaid)    ROM: start PROM at 3 weeks, limit flexion 90', IR 45', abdxn 90';    at 6 weeks begin active/active assisted ROM; passive ROM to tolerance, goal full ER, 135' flexion, 120' abduction;   at 12 weeks full ROM   Immobilizer: on at all times except hygiene and therapy for 6 weeks then d/c after 6 weeks   Exercises: 0-3 wks elbow/wrist  only;   3-6 wks begin PROM activities with posterior capsule mobilizations, closed chain scapula;   8 weeks begin deltoid/rotator cuff isometrics;   12 weeks, work on glenohumeral stabilization, cycling/running at 12 weeks;   4-5 months scapular stabilization, eccentric strengthening, plyometrics, endurance.      Per surgeon note on 12/14/22: Plan for return work now, but needs to avoid overhead activity and heavy lifting over 10 lbs until 3 months after surgery on the surgical arm.  Plan for return to throwing program to start at physical therapy at 3 months after surgery.    The following procedures were performed in the Right Shoulder:  Arthroscopic Shoulder Debridement -  Extensive (08129)  Arthroscopic Posterior Labral Repair ([id number])  Arthroscopic Superior Labrum (SLAP) Repair ([id number])         SUBJECTIVE     Pt reports: he's happy with his ROM with the exception of reaching into FIR behind his back.   He was compliant with home exercise program.  Response to previous treatment: felt fine  Functional change: ongoing    Pain: 0/10  Location: right SLAP, rotator cuff debridement, posterior labral repair    OBJECTIVE     NP    TREATMENT      Long received the treatments listed below:    Bold = performed     All billed as therex based on medicaid guidelines  therapeutic exercises to develop strength, endurance, ROM, flexibility, and posture for 25 minutes including:    Self ER mobilization with mob band 10x5s  Supine ER @90 degrees 2x10 +3#   Standing IR @90 degrees 3x20s fast pace   Face pulls GTB 2x10   Overhead carries reverse KB 10# 2 big laps around gym   1/2 kneeling ball toss - x20   Prone on physio T's - ball toss grn and iso hold 2# - 2x20s    NP  Every minute on the minute for 9 mins:  Minute 1 - Prayer stretch in stool with dowel 10x10s   Minute 2 - supine lat stretch with 5# dowel   Minute 3 - Abduction with foam at wall     Yellow med ball on wall ABCs, A-Z x2 each R   IR/ER @ 90 degrees with cables 2x10 7# both ways  wall clocks (1, 3, and 5) with yellow theraband at wrist 2x5   Push up with plus on the wall x15  shoulder PNF D2 (sword from pocket) with yellow theraband 2x8  prone Y,T,I 2# 2x8    manual therapy techniques: Joint mobilizations and Soft tissue Mobilization were applied for 20 minutes, including:  Contract relax ER  Inferior, anterior  mobs Grades 3-4  Teres and lat IASTM  Supine and prone scapular pin and stretch into shoulder abduction      neuromuscular re-education activities to improve: Balance, Coordination, Proprioception, and Posture for 0 minutes. The following activities were included:       PATIENT EDUCATION AND HOME EXERCISES     Home Exercises  Provided and Patient Education Provided     Education provided:   - eduction on condition  - home exercise program     Written Home Exercises Provided: yes. Exercises were reviewed and Long was able to demonstrate them prior to the end of the session.  Long demonstrated good  understanding of the education provided. See EMR under Patient Instructions for exercises provided during therapy sessions.    ASSESSMENT   Patient continues to respond well to mobilization. Still limited in ER@ 90 but this is progressing with each visit. Strength is very good at this point but his endurance is lacking.     Alves is progressing towards his goals.   Pt prognosis is Good.     Pt will continue to benefit from skilled outpatient physical therapy to address the deficits listed in the problem list box on initial evaluation, provide pt/family education and to maximize pt's level of independence in the home and community environment.     Pt's spiritual, cultural and educational needs considered and pt agreeable to plan of care and goals.     Anticipated barriers to physical therapy: scheduling    Goals:   Short Term Goals:  5 weeks   1.Report decreased R shoulder pain < / =  0/10  to increase tolerance for daily activity MET  2. Increase PROM to within protocol guidelines   3. Increased strength by 1/3 MMT grade in R UE to increase tolerance for ADL and work activities. MET  4. Pt to tolerate HEP to improve ROM and independence with ADL's     Long Term Goals: 15 weeks  1.Pt to like 20 lbs without increase in pain for return to work  2.Increase AROM to equal to left   3.Increase strength to >/= 4/5 in R UE to increase tolerance for ADL and work activities.  4. Pt goal: Pt to perform weighted overhead activities without increase in pain to be able to return to softball  5. Pt will have improved gcode of CJ (20-40% limited) on FOTO shoulder in order to demonstrate true functional improvement.     PLAN     Plan of care Certification:  11/9/2022 to 2/28/23.    Cont POC    Dada Saldivar PT

## 2023-02-02 ENCOUNTER — OFFICE VISIT (OUTPATIENT)
Dept: ORTHOPEDICS | Facility: CLINIC | Age: 23
End: 2023-02-02
Payer: MEDICAID

## 2023-02-02 VITALS
HEART RATE: 65 BPM | SYSTOLIC BLOOD PRESSURE: 133 MMHG | HEIGHT: 66 IN | WEIGHT: 170.88 LBS | DIASTOLIC BLOOD PRESSURE: 82 MMHG | BODY MASS INDEX: 27.46 KG/M2

## 2023-02-02 DIAGNOSIS — M75.101 TEAR OF RIGHT SUPRASPINATUS TENDON: ICD-10-CM

## 2023-02-02 DIAGNOSIS — S43.431D SUPERIOR GLENOID LABRUM LESION OF RIGHT SHOULDER, SUBSEQUENT ENCOUNTER: Primary | ICD-10-CM

## 2023-02-02 PROCEDURE — 3008F PR BODY MASS INDEX (BMI) DOCUMENTED: ICD-10-PCS | Mod: CPTII,,, | Performed by: PHYSICIAN ASSISTANT

## 2023-02-02 PROCEDURE — 3075F SYST BP GE 130 - 139MM HG: CPT | Mod: CPTII,,, | Performed by: PHYSICIAN ASSISTANT

## 2023-02-02 PROCEDURE — 99213 OFFICE O/P EST LOW 20 MIN: CPT | Mod: PBBFAC,PN | Performed by: PHYSICIAN ASSISTANT

## 2023-02-02 PROCEDURE — 1159F MED LIST DOCD IN RCRD: CPT | Mod: CPTII,,, | Performed by: PHYSICIAN ASSISTANT

## 2023-02-02 PROCEDURE — 3079F DIAST BP 80-89 MM HG: CPT | Mod: CPTII,,, | Performed by: PHYSICIAN ASSISTANT

## 2023-02-02 PROCEDURE — 1159F PR MEDICATION LIST DOCUMENTED IN MEDICAL RECORD: ICD-10-PCS | Mod: CPTII,,, | Performed by: PHYSICIAN ASSISTANT

## 2023-02-02 PROCEDURE — 99024 PR POST-OP FOLLOW-UP VISIT: ICD-10-PCS | Mod: ,,, | Performed by: PHYSICIAN ASSISTANT

## 2023-02-02 PROCEDURE — 99999 PR PBB SHADOW E&M-EST. PATIENT-LVL III: ICD-10-PCS | Mod: PBBFAC,,, | Performed by: PHYSICIAN ASSISTANT

## 2023-02-02 PROCEDURE — 3075F PR MOST RECENT SYSTOLIC BLOOD PRESS GE 130-139MM HG: ICD-10-PCS | Mod: CPTII,,, | Performed by: PHYSICIAN ASSISTANT

## 2023-02-02 PROCEDURE — 99999 PR PBB SHADOW E&M-EST. PATIENT-LVL III: CPT | Mod: PBBFAC,,, | Performed by: PHYSICIAN ASSISTANT

## 2023-02-02 PROCEDURE — 3008F BODY MASS INDEX DOCD: CPT | Mod: CPTII,,, | Performed by: PHYSICIAN ASSISTANT

## 2023-02-02 PROCEDURE — 3079F PR MOST RECENT DIASTOLIC BLOOD PRESSURE 80-89 MM HG: ICD-10-PCS | Mod: CPTII,,, | Performed by: PHYSICIAN ASSISTANT

## 2023-02-02 PROCEDURE — 99024 POSTOP FOLLOW-UP VISIT: CPT | Mod: ,,, | Performed by: PHYSICIAN ASSISTANT

## 2023-02-02 NOTE — PROGRESS NOTES
[facility], Orth[facility]ics and [facility]  [facility]    Shoulder Post-op Visit  02/02/2023     Pre-op Diagnosis:    Right shoulder pain  SLAP Tear            Supraspinatus tendon tear, partial        Posterior Labral Tear     Procedure: 11/1/2022  Arthroscopic Shoulder Debridement - Extensive    Arthroscopic Posterior Labral Repair   Arthroscopic Superior Labrum (SLAP) Repair      Subjective:      Long Brunner is a 22 y.o. male who is now 3 months status post right shoulder surgery. The patient is not having any pain. The patient denies none, fever, wound drainage, increasing redness, pus, increasing pain, increasing swelling. Post op problems reported: none.    Notes soreness after PT. Just started working on overhead activities in PT. Doing well.      Objective:      Ortho/SPM Exam  General: alert, appears stated age, and cooperative   Sutures: Sutures out.  Incision: healing well, no significant drainage, no dehiscence, no significant erythema  Tenderness: none  Forward Flexion: 180 degrees  External Rotation: 90 degrees  Elbow/Wrist/Hand: Full ROM  Neuro: Intact to light touch Ax/R/U/M  Vascular: CR<2s. Palpable radial pulse      Imaging:  None taken today       Assessment:       The patient is status post right shoulder surgery. The primary encounter diagnosis was Superior glenoid labrum lesion of right shoulder, subsequent encounter. A diagnosis of Tear of right supraspinatus tendon was also pertinent to this visit.  Doing well postoperatively. Continuation of post-op rehab course is recommended at this time. All of the patient's questions were answered.    Continuation of PT. Advance into overhead activities/ throwing in PT. Advance WBAT.      Plan:      Tylenol 650mg TID PRN for pain.  Continue physical therapy.  Follow up as needed. In 3 mon for 6 mon PO visit with Dr. Chance Barrett PA-C  Department of Orthopedic Surgery  [facility]  [place]  Office: [phone]  Website: [email or web address]        No orders of the defined types were placed in this encounter.

## 2023-02-03 ENCOUNTER — CLINICAL SUPPORT (OUTPATIENT)
Dept: REHABILITATION | Facility: HOSPITAL | Age: 23
End: 2023-02-03
Attending: ORTHOPAEDIC SURGERY
Payer: MEDICAID

## 2023-02-03 DIAGNOSIS — R29.898 WEAKNESS OF SHOULDER: ICD-10-CM

## 2023-02-03 DIAGNOSIS — M25.611 DECREASED RIGHT SHOULDER RANGE OF MOTION: Primary | ICD-10-CM

## 2023-02-03 DIAGNOSIS — S43.431D SUPERIOR GLENOID LABRUM LESION OF RIGHT SHOULDER, SUBSEQUENT ENCOUNTER: ICD-10-CM

## 2023-02-03 PROCEDURE — 97110 THERAPEUTIC EXERCISES: CPT | Mod: PO

## 2023-02-03 NOTE — PROGRESS NOTES
[facility]   Physical Therapy Treatment Note    Name: Long Brunner  Clinic Number: [id number]    Therapy Diagnosis:   Encounter Diagnoses   Name Primary?    Decreased right shoulder range of motion Yes    Superior glenoid labrum lesion of right shoulder, subsequent encounter     Weakness of shoulder          Physician: Tj Fletcher IV, MD    Visit Date: 2/3/2023    Physician Orders: PT Eval and Treat   Medical Diagnosis from Referral: Superior glenoid labrum lesion of right shoulder, subsequent encounter [S43.431D]  Evaluation Date: 11/9/2022  Authorization Period Expiration: 4/12/23  Plan of Care Expiration: 2/28/23  Progress Note Due: 2/19/23  Visit # / Visits authorized: 9/10  FOTO: 4/3 EPISODE COMPLETED AND CLOSED  PTA Visit #: 0/5     Precautions: Standard, POST OP  (surgery date 11/1/22)    Time In: 9:05  Time Out: 9:45  Total Billable Time: 40    ROM: start PROM at 3 weeks, limit flexion 90', IR 45', abdxn 90';    at 6 weeks begin active/active assisted ROM; passive ROM to tolerance, goal full ER, 135' flexion, 120' abduction;   at 12 weeks full ROM   Immobilizer: on at all times except hygiene and therapy for 6 weeks then d/c after 6 weeks   Exercises: 0-3 wks elbow/wrist  only;   3-6 wks begin PROM activities with posterior capsule mobilizations, closed chain scapula;   8 weeks begin deltoid/rotator cuff isometrics;   12 weeks, work on glenohumeral stabilization, cycling/running at 12 weeks;   4-5 months scapular stabilization, eccentric strengthening, plyometrics, endurance.      Per surgeon note on 12/14/22: Plan for return work now, but needs to avoid overhead activity and heavy lifting over 10 lbs until 3 months after surgery on the surgical arm.  Plan for return to throwing program to start at physical therapy at 3 months after surgery.    The following procedures were performed in the Right Shoulder:  Arthroscopic Shoulder Debridement - Extensive (24438)  Arthroscopic  Posterior Labral Repair ([id number])  Arthroscopic Superior Labrum (SLAP) Repair ([id number])         SUBJECTIVE     Pt reports: soreness  He was compliant with home exercise program.  Response to previous treatment: felt fine  Functional change: ongoing    Pain: 0/10  Location: right SLAP, rotator cuff debridement, posterior labral repair    OBJECTIVE     NP    TREATMENT      Long received the treatments listed below:    Bold = performed     All billed as therex based on medicaid guidelines  therapeutic exercises to develop strength, endurance, ROM, flexibility, and posture for 30 minutes including:    Self ER mobilization with mob band 10x5s  Sidelying external rotation eccentrics with red superband x20  Supine ER @90 degrees 2x10 +7# at free motion  Standing IR @90 degrees 3x20s fast pace with yellow theraband   Face pulls with external rotation GTB 2x10   Overhead carries reverse KB 10# 2 big laps around gym   1/2 kneeling red ball toss - x20   1/2 kneeling trampoline green ball toss - x20  Prone on physio T's - ball toss grn and contralateral iso hold 2# - 2x20s    NP  Every minute on the minute for 9 mins:  Minute 1 - Prayer stretch in stool with dowel 10x10s   Minute 2 - supine lat stretch with 5# dowel   Minute 3 - Abduction with foam at wall     Yellow med ball on wall ABCs, A-Z x2 each R   IR/ER @ 90 degrees with cables 2x10 7# both ways  wall clocks (1, 3, and 5) with yellow theraband at wrist 2x5   Push up with plus on the wall x15  shoulder PNF D2 (sword from pocket) with yellow theraband 2x8  prone Y,T,I 2# 2x8    manual therapy techniques: Joint mobilizations and Soft tissue Mobilization were applied for 10 minutes, including:  Contract relax ER  Inferior, anterior  mobs Grades 3-4  Teres and lat IASTM       neuromuscular re-education activities to improve: Balance, Coordination, Proprioception, and Posture for 0 minutes. The following activities were included:       PATIENT EDUCATION AND HOME EXERCISES      Home Exercises Provided and Patient Education Provided     Education provided:   - eduction on condition  - home exercise program     Written Home Exercises Provided: yes. Exercises were reviewed and Long was able to demonstrate them prior to the end of the session.  Long demonstrated good  understanding of the education provided. See EMR under Patient Instructions for exercises provided during therapy sessions.    ASSESSMENT   Patient with some soreness but 0/10 pain following last visit. He continues to respond well to mobilization but remains limited in ER@ 90 but this is progressing with each visit. Strength is very good at this point but his endurance is lacking. Able to complete all exercises without adverse effect. Will continue to encourage range of motion and strength to progress towards throwing.     Long is progressing towards his goals.   Pt prognosis is Good.     Pt will continue to benefit from skilled outpatient physical therapy to address the deficits listed in the problem list box on initial evaluation, provide pt/family education and to maximize pt's level of independence in the home and community environment.     Pt's spiritual, cultural and educational needs considered and pt agreeable to plan of care and goals.     Anticipated barriers to physical therapy: scheduling    Goals:   Short Term Goals:  5 weeks   1.Report decreased R shoulder pain < / =  0/10  to increase tolerance for daily activity MET  2. Increase PROM to within protocol guidelines   3. Increased strength by 1/3 MMT grade in R UE to increase tolerance for ADL and work activities. MET  4. Pt to tolerate HEP to improve ROM and independence with ADL's     Long Term Goals: 15 weeks  1.Pt to like 20 lbs without increase in pain for return to work  2.Increase AROM to equal to left   3.Increase strength to >/= 4/5 in R UE to increase tolerance for ADL and work activities.  4. Pt goal: Pt to perform weighted overhead activities without  increase in pain to be able to return to softball  5. Pt will have improved gcode of CJ (20-40% limited) on FOTO shoulder in order to demonstrate true functional improvement.     PLAN     Plan of care Certification: 11/9/2022 to 2/28/23.    Cont POC    Alicia Ly, PT

## 2023-02-08 ENCOUNTER — CLINICAL SUPPORT (OUTPATIENT)
Dept: REHABILITATION | Facility: HOSPITAL | Age: 23
End: 2023-02-08
Attending: ORTHOPAEDIC SURGERY
Payer: MEDICAID

## 2023-02-08 DIAGNOSIS — R29.898 WEAKNESS OF SHOULDER: ICD-10-CM

## 2023-02-08 DIAGNOSIS — M25.611 DECREASED RIGHT SHOULDER RANGE OF MOTION: Primary | ICD-10-CM

## 2023-02-08 DIAGNOSIS — S43.431D SUPERIOR GLENOID LABRUM LESION OF RIGHT SHOULDER, SUBSEQUENT ENCOUNTER: ICD-10-CM

## 2023-02-08 PROCEDURE — 97110 THERAPEUTIC EXERCISES: CPT | Mod: PO

## 2023-02-08 NOTE — PROGRESS NOTES
[facility]   Physical Therapy Treatment Note    Name: Long Brunner  Clinic Number: [id number]    Therapy Diagnosis:   Encounter Diagnoses   Name Primary?    Decreased right shoulder range of motion Yes    Superior glenoid labrum lesion of right shoulder, subsequent encounter     Weakness of shoulder            Physician: Tj Fletcher IV, MD    Visit Date: 2/8/2023    Physician Orders: PT Eval and Treat   Medical Diagnosis from Referral: Superior glenoid labrum lesion of right shoulder, subsequent encounter [S43.431D]  Evaluation Date: 11/9/2022  Authorization Period Expiration: 4/12/23  Plan of Care Expiration: 2/28/23  Progress Note Due: 2/19/23  Visit # / Visits authorized: 9/10  FOTO: 4/3 EPISODE COMPLETED AND CLOSED  PTA Visit #: 0/5     Precautions: Standard, POST OP  (surgery date 11/1/22)    Time In: 8:00  Time Out: 8:40  Total Billable Time: 40    ROM: start PROM at 3 weeks, limit flexion 90', IR 45', abdxn 90';    at 6 weeks begin active/active assisted ROM; passive ROM to tolerance, goal full ER, 135' flexion, 120' abduction;   at 12 weeks full ROM   Immobilizer: on at all times except hygiene and therapy for 6 weeks then d/c after 6 weeks   Exercises: 0-3 wks elbow/wrist  only;   3-6 wks begin PROM activities with posterior capsule mobilizations, closed chain scapula;   8 weeks begin deltoid/rotator cuff isometrics;   12 weeks, work on glenohumeral stabilization, cycling/running at 12 weeks;   4-5 months scapular stabilization, eccentric strengthening, plyometrics, endurance.      Per surgeon note on 12/14/22: Plan for return work now, but needs to avoid overhead activity and heavy lifting over 10 lbs until 3 months after surgery on the surgical arm.  Plan for return to throwing program to start at physical therapy at 3 months after surgery.    The following procedures were performed in the Right Shoulder:  Arthroscopic Shoulder Debridement - Extensive ([id number])  Arthroscopic  Posterior Labral Repair ([id number])  Arthroscopic Superior Labrum (SLAP) Repair ([id number])         SUBJECTIVE     Pt reports: no soreness or pain the shoulder following last visit   He was compliant with home exercise program.  Response to previous treatment: felt fine  Functional change: ongoing    Pain: 0/10  Location: right SLAP, rotator cuff debridement, posterior labral repair    OBJECTIVE     NP    TREATMENT      Long received the treatments listed below:    Bold = performed     All billed as therex based on medicaid guidelines  therapeutic exercises to develop strength, endurance, ROM, flexibility, and posture for 30 minutes including:    Self ER mobilization with mob band 10x5s  Sidelying external rotation eccentrics with red superband x20  Supine ER @90 degrees 2x10 +7# at free motion  Standing IR @90 degrees 3x20s fast pace with yellow theraband   Face pulls with external rotation GTB 2x10   Overhead carries reverse KB 10# 2 big laps around gym   1/2 kneeling red ball toss - x20   1/2 kneeling trampoline green ball toss - x20  Prone on physio T's - ball toss grn and contralateral iso hold 2# - 2x20s  +split stance overhead press 2x10 6# B  +basketball chest pass x2 min with variable speed and directions    NP  Every minute on the minute for 9 mins:  Minute 1 - Prayer stretch in stool with dowel 10x10s   Minute 2 - supine lat stretch with 5# dowel   Minute 3 - Abduction with foam at wall     Yellow med ball on wall ABCs, A-Z x2 each R   IR/ER @ 90 degrees with cables 2x10 7# both ways  wall clocks (1, 3, and 5) with yellow theraband at wrist 2x5   Push up with plus on the wall x15  shoulder PNF D2 (sword from pocket) with yellow theraband 2x8  prone Y,T,I 2# 2x8    manual therapy techniques: Joint mobilizations and Soft tissue Mobilization were applied for 10 minutes, including:  Contract relax ER  Inferior, anterior  mobs Grades 3-4  Teres and lat IASTM       neuromuscular re-education activities to improve:  Balance, Coordination, Proprioception, and Posture for 0 minutes. The following activities were included:       PATIENT EDUCATION AND HOME EXERCISES     Home Exercises Provided and Patient Education Provided     Education provided:   - eduction on condition  - home exercise program     Written Home Exercises Provided: yes. Exercises were reviewed and Long was able to demonstrate them prior to the end of the session.  Long demonstrated good  understanding of the education provided. See EMR under Patient Instructions for exercises provided during therapy sessions.    ASSESSMENT   Long is doing very well today with good tolerance to newly added activities. No pain or discomfort with chest pass today with some overhead tosses included. Continue to progress tissue loading as tolerated.     Long is progressing towards his goals.   Pt prognosis is Good.     Pt will continue to benefit from skilled outpatient physical therapy to address the deficits listed in the problem list box on initial evaluation, provide pt/family education and to maximize pt's level of independence in the home and community environment.     Pt's spiritual, cultural and educational needs considered and pt agreeable to plan of care and goals.     Anticipated barriers to physical therapy: scheduling    Goals:   Short Term Goals:  5 weeks   1.Report decreased R shoulder pain < / =  0/10  to increase tolerance for daily activity MET  2. Increase PROM to within protocol guidelines   3. Increased strength by 1/3 MMT grade in R UE to increase tolerance for ADL and work activities. MET  4. Pt to tolerate HEP to improve ROM and independence with ADL's     Long Term Goals: 15 weeks  1.Pt to like 20 lbs without increase in pain for return to work  2.Increase AROM to equal to left   3.Increase strength to >/= 4/5 in R UE to increase tolerance for ADL and work activities.  4. Pt goal: Pt to perform weighted overhead activities without increase in pain to be able  to return to softball  5. Pt will have improved gcode of CJ (20-40% limited) on FOTO shoulder in order to demonstrate true functional improvement.     PLAN     Plan of care Certification: 11/9/2022 to 2/28/23.    Cont POC    Alicia Ly, PT

## 2023-02-10 ENCOUNTER — CLINICAL SUPPORT (OUTPATIENT)
Dept: REHABILITATION | Facility: HOSPITAL | Age: 23
End: 2023-02-10
Attending: ORTHOPAEDIC SURGERY
Payer: MEDICAID

## 2023-02-10 DIAGNOSIS — S43.431D SUPERIOR GLENOID LABRUM LESION OF RIGHT SHOULDER, SUBSEQUENT ENCOUNTER: ICD-10-CM

## 2023-02-10 DIAGNOSIS — R29.898 WEAKNESS OF SHOULDER: ICD-10-CM

## 2023-02-10 DIAGNOSIS — M25.611 DECREASED RIGHT SHOULDER RANGE OF MOTION: Primary | ICD-10-CM

## 2023-02-10 PROCEDURE — 97110 THERAPEUTIC EXERCISES: CPT | Mod: PO

## 2023-02-10 NOTE — PROGRESS NOTES
[facility]   Physical Therapy Treatment Note    Name: Long Brunner  Clinic Number: [id number]    Therapy Diagnosis:   Encounter Diagnoses   Name Primary?    Decreased right shoulder range of motion Yes    Superior glenoid labrum lesion of right shoulder, subsequent encounter     Weakness of shoulder            Physician: Tj Fletcher IV, MD    Visit Date: 2/10/2023    Physician Orders: PT Eval and Treat   Medical Diagnosis from Referral: Superior glenoid labrum lesion of right shoulder, subsequent encounter [S43.431D]  Evaluation Date: 11/9/2022  Authorization Period Expiration: 4/12/23  Plan of Care Expiration: 2/28/23  Progress Note Due: 2/19/23  Visit # / Visits authorized: 11/10  FOTO: 4/3 EPISODE COMPLETED AND CLOSED  PTA Visit #: 0/5     Precautions: Standard, POST OP  (surgery date 11/1/22)    Time In: 8:30 am  Time Out: 9:23 am  Total Billable Time: 53    ROM: start PROM at 3 weeks, limit flexion 90', IR 45', abdxn 90';    at 6 weeks begin active/active assisted ROM; passive ROM to tolerance, goal full ER, 135' flexion, 120' abduction;   at 12 weeks full ROM   Immobilizer: on at all times except hygiene and therapy for 6 weeks then d/c after 6 weeks   Exercises: 0-3 wks elbow/wrist  only;   3-6 wks begin PROM activities with posterior capsule mobilizations, closed chain scapula;   8 weeks begin deltoid/rotator cuff isometrics;   12 weeks, work on glenohumeral stabilization, cycling/running at 12 weeks;   4-5 months scapular stabilization, eccentric strengthening, plyometrics, endurance.      Per surgeon note on 12/14/22: Plan for return work now, but needs to avoid overhead activity and heavy lifting over 10 lbs until 3 months after surgery on the surgical arm.  Plan for return to throwing program to start at physical therapy at 3 months after surgery.    The following procedures were performed in the Right Shoulder:  Arthroscopic Shoulder Debridement - Extensive  ([id number])  Arthroscopic Posterior Labral Repair ([id number])  Arthroscopic Superior Labrum (SLAP) Repair ([id number])         SUBJECTIVE     Pt reports: no soreness or pain the shoulder following last visit   He was compliant with home exercise program.  Response to previous treatment: felt fine  Functional change: ongoing    Pain: 0/10  Location: right SLAP, rotator cuff debridement, posterior labral repair    OBJECTIVE     NP    TREATMENT      Long received the treatments listed below:    Bold = performed     All billed as therex based on medicaid guidelines  therapeutic exercises to develop strength, endurance, ROM, flexibility, and posture for 43 minutes including:    Self ER mobilization with mob band 10x5s  Supine  external rotation MR - 2x10   Supine Abd with MR - 2x10   Face pulls with external rotation GTB 2x12  Standing IR @90 degrees 3x30s fast pace with Blue theraband   Prone on physio T's - ball toss grn and contralateral iso hold 2# - 3x20s  Overhead carries reverse KB 10# 2 big laps around gym   Ball toss in throwing motion against trampoline - 3x30    NP  Every minute on the minute for 9 mins:  Minute 1 - Prayer stretch in stool with dowel 10x10s   Minute 2 - supine lat stretch with 5# dowel   Minute 3 - Abduction with foam at wall     Yellow med ball on wall ABCs, A-Z x2 each R   IR/ER @ 90 degrees with cables 2x10 7# both ways  wall clocks (1, 3, and 5) with yellow theraband at wrist 2x5   Push up with plus on the wall x15  shoulder PNF D2 (sword from pocket) with yellow theraband 2x8  prone Y,T,I 2# 2x8    manual therapy techniques: Joint mobilizations and Soft tissue Mobilization were applied for 10 minutes, including:  Contract relax ER  Inferior, anterior  mobs Grades 3-4  Teres pin and stretch        neuromuscular re-education activities to improve: Balance, Coordination, Proprioception, and Posture for 0 minutes. The following activities were included:       PATIENT EDUCATION AND HOME EXERCISES     Home  Exercises Provided and Patient Education Provided     Education provided:   - eduction on condition  - home exercise program     Written Home Exercises Provided: yes. Exercises were reviewed and Long was able to demonstrate them prior to the end of the session.  Long demonstrated good  understanding of the education provided. See EMR under Patient Instructions for exercises provided during therapy sessions.    ASSESSMENT   Long is doing very well today with good tolerance to newly added activities. No pain or discomfort with chest pass today with some overhead tosses included. Continue to progress tissue loading as tolerated.     Long is progressing towards his goals.   Pt prognosis is Good.     Pt will continue to benefit from skilled outpatient physical therapy to address the deficits listed in the problem list box on initial evaluation, provide pt/family education and to maximize pt's level of independence in the home and community environment.     Pt's spiritual, cultural and educational needs considered and pt agreeable to plan of care and goals.     Anticipated barriers to physical therapy: scheduling    Goals:   Short Term Goals:  5 weeks   1.Report decreased R shoulder pain < / =  0/10  to increase tolerance for daily activity MET  2. Increase PROM to within protocol guidelines   3. Increased strength by 1/3 MMT grade in R UE to increase tolerance for ADL and work activities. MET  4. Pt to tolerate HEP to improve ROM and independence with ADL's     Long Term Goals: 15 weeks  1.Pt to like 20 lbs without increase in pain for return to work  2.Increase AROM to equal to left   3.Increase strength to >/= 4/5 in R UE to increase tolerance for ADL and work activities.  4. Pt goal: Pt to perform weighted overhead activities without increase in pain to be able to return to softball  5. Pt will have improved gcode of CJ (20-40% limited) on FOTO shoulder in order to demonstrate true functional improvement.     PLAN      Plan of care Certification: 11/9/2022 to 2/28/23.    Cont POC    Dada Saldivar, PT

## 2023-02-15 ENCOUNTER — CLINICAL SUPPORT (OUTPATIENT)
Dept: REHABILITATION | Facility: HOSPITAL | Age: 23
End: 2023-02-15
Attending: ORTHOPAEDIC SURGERY
Payer: MEDICAID

## 2023-02-15 DIAGNOSIS — S43.431D SUPERIOR GLENOID LABRUM LESION OF RIGHT SHOULDER, SUBSEQUENT ENCOUNTER: ICD-10-CM

## 2023-02-15 DIAGNOSIS — R29.898 WEAKNESS OF SHOULDER: ICD-10-CM

## 2023-02-15 DIAGNOSIS — M25.611 DECREASED RIGHT SHOULDER RANGE OF MOTION: Primary | ICD-10-CM

## 2023-02-15 PROCEDURE — 97110 THERAPEUTIC EXERCISES: CPT | Mod: PO

## 2023-02-15 NOTE — PROGRESS NOTES
[facility]   Physical Therapy Treatment Note    Name: Long Brunner  Clinic Number: [id number]    Therapy Diagnosis:   Encounter Diagnoses   Name Primary?    Decreased right shoulder range of motion Yes    Superior glenoid labrum lesion of right shoulder, subsequent encounter     Weakness of shoulder              Physician: Tj Fletcher IV, MD    Visit Date: 2/15/2023    Physician Orders: PT Eval and Treat   Medical Diagnosis from Referral: Superior glenoid labrum lesion of right shoulder, subsequent encounter [S43.431D]  Evaluation Date: 11/9/2022  Authorization Period Expiration: 4/12/23  Plan of Care Expiration: 2/28/23  Progress Note Due: 2/19/23  Visit # / Visits authorized: 12/10  FOTO: 4/3 EPISODE COMPLETED AND CLOSED  PTA Visit #: 0/5     Precautions: Standard, POST OP  (surgery date 11/1/22)    Time In: 8:03  Time Out: 8:43  Total Billable Time: 40    ROM: start PROM at 3 weeks, limit flexion 90', IR 45', abdxn 90';    at 6 weeks begin active/active assisted ROM; passive ROM to tolerance, goal full ER, 135' flexion, 120' abduction;   at 12 weeks full ROM   Immobilizer: on at all times except hygiene and therapy for 6 weeks then d/c after 6 weeks   Exercises: 0-3 wks elbow/wrist  only;   3-6 wks begin PROM activities with posterior capsule mobilizations, closed chain scapula;   8 weeks begin deltoid/rotator cuff isometrics;   12 weeks, work on glenohumeral stabilization, cycling/running at 12 weeks;   4-5 months scapular stabilization, eccentric strengthening, plyometrics, endurance.      Per surgeon note on 12/14/22: Plan for return work now, but needs to avoid overhead activity and heavy lifting over 10 lbs until 3 months after surgery on the surgical arm.  Plan for return to throwing program to start at physical therapy at 3 months after surgery.    The following procedures were performed in the Right Shoulder:  Arthroscopic Shoulder Debridement - Extensive  "([id number])  Arthroscopic Posterior Labral Repair ([id number])  Arthroscopic Superior Labrum (SLAP) Repair ([id number])         SUBJECTIVE     Pt reports: no soreness or pain the shoulder following last visit   He was compliant with home exercise program.  Response to previous treatment: felt fine  Functional change: ongoing    Pain: 0/10  Location: right SLAP, rotator cuff debridement, posterior labral repair    OBJECTIVE     NP    TREATMENT      Long received the treatments listed below:    Bold = performed     All billed as therex based on medicaid guidelines  therapeutic exercises to develop strength, endurance, ROM, flexibility, and posture for 35 minutes including:    Self ER mobilization with mob band 10x5s  Supine  external rotation MR - 2x10   Supine Abd with MR - 2x10   Face pulls with external rotation GTB 2x12  Standing IR @90 degrees 3x30s fast pace with Blue theraband   Prone on physio T's - ball toss red and contralateral iso hold 2# - 3x30s  Overhead carries reverse KB 15# 2 big laps around gym   Ball toss in throwing motion against trampoline - 3x30  Lateral hand walks over 6" box in plank position x10   Body blade D2 pattern x15 with slow pace  +supine lat eccentric with 4# dowel 15x5"     NP  Every minute on the minute for 9 mins:  Minute 1 - Prayer stretch in stool with dowel 10x10s   Minute 2 - supine lat stretch with 5# dowel   Minute 3 - Abduction with foam at wall     Yellow med ball on wall ABCs, A-Z x2 each R   IR/ER @ 90 degrees with cables 2x10 7# both ways  wall clocks (1, 3, and 5) with yellow theraband at wrist 2x5   Push up with plus on the wall x15  shoulder PNF D2 (sword from pocket) with yellow theraband 2x8  prone Y,T,I 2# 2x8    manual therapy techniques: Joint mobilizations and Soft tissue Mobilization were applied for 5 minutes, including:  Contract relax ER and abduction  Prom into abduction and flexion blocking scapular movement   Inferior, anterior  mobs Grades 3-4  Teres pin and stretch "        neuromuscular re-education activities to improve: Balance, Coordination, Proprioception, and Posture for 0 minutes. The following activities were included:       PATIENT EDUCATION AND HOME EXERCISES     Home Exercises Provided and Patient Education Provided     Education provided:   - eduction on condition  - home exercise program     Written Home Exercises Provided: yes. Exercises were reviewed and Long was able to demonstrate them prior to the end of the session.  Long demonstrated good  understanding of the education provided. See EMR under Patient Instructions for exercises provided during therapy sessions.    ASSESSMENT   Long continues to progress well with no pain. He is adequately challenged by shoulder stabilization activities and is doing well with overhead throwing at this time. Range of motion remains mildly limited but is improving. Continue to progress as tolerated.     Long is progressing towards his goals.   Pt prognosis is Good.     Pt will continue to benefit from skilled outpatient physical therapy to address the deficits listed in the problem list box on initial evaluation, provide pt/family education and to maximize pt's level of independence in the home and community environment.     Pt's spiritual, cultural and educational needs considered and pt agreeable to plan of care and goals.     Anticipated barriers to physical therapy: scheduling    Goals:   Short Term Goals:  5 weeks   1.Report decreased R shoulder pain < / =  0/10  to increase tolerance for daily activity MET  2. Increase PROM to within protocol guidelines   3. Increased strength by 1/3 MMT grade in R UE to increase tolerance for ADL and work activities. MET  4. Pt to tolerate HEP to improve ROM and independence with ADL's     Long Term Goals: 15 weeks  1.Pt to like 20 lbs without increase in pain for return to work  2.Increase AROM to equal to left   3.Increase strength to >/= 4/5 in R UE to increase tolerance for ADL and  work activities.  4. Pt goal: Pt to perform weighted overhead activities without increase in pain to be able to return to softball  5. Pt will have improved gcode of CJ (20-40% limited) on FOTO shoulder in order to demonstrate true functional improvement.     PLAN     Plan of care Certification: 11/9/2022 to 2/28/23.    Cont POC    Alicia Ly, PT

## 2023-02-17 ENCOUNTER — CLINICAL SUPPORT (OUTPATIENT)
Dept: REHABILITATION | Facility: HOSPITAL | Age: 23
End: 2023-02-17
Attending: ORTHOPAEDIC SURGERY
Payer: MEDICAID

## 2023-02-17 DIAGNOSIS — M25.611 DECREASED RIGHT SHOULDER RANGE OF MOTION: Primary | ICD-10-CM

## 2023-02-17 DIAGNOSIS — S43.431D SUPERIOR GLENOID LABRUM LESION OF RIGHT SHOULDER, SUBSEQUENT ENCOUNTER: ICD-10-CM

## 2023-02-17 DIAGNOSIS — R29.898 WEAKNESS OF SHOULDER: ICD-10-CM

## 2023-02-17 PROCEDURE — 97110 THERAPEUTIC EXERCISES: CPT | Mod: PO

## 2023-02-17 NOTE — PROGRESS NOTES
[facility]   Physical Therapy Treatment Note / Reassessment    Name: Long Brunner  Clinic Number: [id number]    Therapy Diagnosis:   Encounter Diagnoses   Name Primary?    Decreased right shoulder range of motion Yes    Superior glenoid labrum lesion of right shoulder, subsequent encounter     Weakness of shoulder      Physician: Tj Fletcher IV, MD    Visit Date: 2/17/2023    Physician Orders: PT Eval and Treat   Medical Diagnosis from Referral: Superior glenoid labrum lesion of right shoulder, subsequent encounter [S4.763D]  Evaluation Date: 11/9/2022  Authorization Period Expiration: 4/12/23  Plan of Care Expiration: NEW 3/31/23  Progress Note Due: 3/17/23  Visit # / Visits authorized: 13/15  FOTO: 4/3 EPISODE COMPLETED AND CLOSED  PTA Visit #: 0/5     Precautions: Standard, POST OP  (surgery date 11/1/22)    Time In: 8:30 am  Time Out: 9:30 am  Total Billable Time: 60     ROM: start PROM at 3 weeks, limit flexion 90', IR 45', abdxn 90';    at 6 weeks begin active/active assisted ROM; passive ROM to tolerance, goal full ER, 135' flexion, 120' abduction;   at 12 weeks full ROM   Immobilizer: on at all times except hygiene and therapy for 6 weeks then d/c after 6 weeks   Exercises: 0-3 wks elbow/wrist  only;   3-6 wks begin PROM activities with posterior capsule mobilizations, closed chain scapula;   8 weeks begin deltoid/rotator cuff isometrics;   12 weeks, work on glenohumeral stabilization, cycling/running at 12 weeks;   4-5 months scapular stabilization, eccentric strengthening, plyometrics, endurance.      Per surgeon note on 12/14/22: Plan for return work now, but needs to avoid overhead activity and heavy lifting over 10 lbs until 3 months after surgery on the surgical arm.  Plan for return to throwing program to start at physical therapy at 3 months after surgery.    The following procedures were performed in the Right Shoulder:  Arthroscopic Shoulder Debridement - Extensive  "([id number])  Arthroscopic Posterior Labral Repair ([id number])  Arthroscopic Superior Labrum (SLAP) Repair ([id number])         SUBJECTIVE     Pt reports: Doing well and feels like the shoulder motion is making good progress  He was compliant with home exercise program.  Response to previous treatment: felt fine  Functional change: ongoing    Pain: 0/10  Location: right SLAP, rotator cuff debridement, posterior labral repair    OBJECTIVE     Active Range of Motion:   Shoulder Right Left   Flexion 165 > 170 180   Abduction 170 180   ER at 90 58> 75 78   IR 45 To stomach at 0   Reach behind head yes yes   Reach behind back  Yes, with some trouble yes      Strength:  Shoulder Right Left   Flexion NT 5/5   Abduction  NT 5/5   ER at 0 NT 5/5   INTERNAL ROTATION at 0 NT 5-/5       TREATMENT      Alves received the treatments listed below:    Bold = performed     All billed as therex based on medicaid guidelines  therapeutic exercises to develop strength, endurance, ROM, flexibility, and posture for 40 minutes including:    Reassessment as above  Self ER mobilization with mob band 10x5s  Supine external rotation MR - 2x10   Supine Abd with MR - 2x10   Face pulls with external rotation and overhead press GTB 2x12  Standing IR @90 degrees 3x30s fast pace with Blue theraband   Downward dog > vinyasa - x10     Every minute on the minute for 9 mins:  Minute 1 - Ball toss in throwing motion against trampoline   Minute 2 - Prone on physio T's - ball toss red and contralateral iso hold 2#   Minute 3 - Ball plyo on wall     Overhead carries reverse KB 15# 2 big laps around gym   Lateral hand walks over 6" box in plank position x10   Body blade D2 pattern x15 with slow pace  +supine lat eccentric with 4# dowel 15x5"   Yellow med ball on wall ABCs, A-Z x2 each R   IR/ER @ 90 degrees with cables 2x10 7# both ways  wall clocks (1, 3, and 5) with yellow theraband at wrist 2x5   Push up with plus on the wall x15  shoulder PNF D2 (sword from pocket) " with yellow theraband 2x8  prone Y,T,I 2# 2x8    manual therapy techniques: Joint mobilizations and Soft tissue Mobilization were applied for 20 minutes, including:  Contract relax ER and abduction  Prom into abduction and flexion blocking scapular movement   Teres pin and stretch   Cupping and IASTM to Lat    PATIENT EDUCATION AND HOME EXERCISES     Home Exercises Provided and Patient Education Provided     Education provided:   - eduction on condition  - home exercise program     Written Home Exercises Provided: yes. Exercises were reviewed and Long was able to demonstrate them prior to the end of the session.  Long demonstrated good  understanding of the education provided. See EMR under Patient Instructions for exercises provided during therapy sessions.    ASSESSMENT   Per reassessment, Long has made great improvements. His strength is excellent, but he fatigues quickly when challenged in his throwing motion. His ROM has improved significantly, but he is still lacking ~10 degrees of ER and abduction necessary to return him to a normal throwing motion. He will require continued skilled PT to address the remaining impairments listed above.     Long is progressing towards his goals.   Pt prognosis is Good.     Pt will continue to benefit from skilled outpatient physical therapy to address the deficits listed in the problem list box on initial evaluation, provide pt/family education and to maximize pt's level of independence in the home and community environment.     Pt's spiritual, cultural and educational needs considered and pt agreeable to plan of care and goals.     Anticipated barriers to physical therapy: scheduling    Goals:   Short Term Goals:  5 weeks   1.Report decreased R shoulder pain < / =  0/10  to increase tolerance for daily activity MET  2. Increase PROM to within protocol guidelines MET  3. Increased strength by 1/3 MMT grade in R UE to increase tolerance for ADL and work activities. MET  4. Pt  to tolerate HEP to improve ROM and independence with ADL's MET     Long Term Goals: 15 weeks  1.Pt to like 20 lbs without increase in pain for return to work MET  2.Increase AROM to equal to left Progressing   3.Increase strength to >/= 4/5 in R UE to increase tolerance for ADL and work activities. MET  4. Pt goal: Pt to perform weighted overhead activities without increase in pain to be able to return to softball MET  5. Pt will have improved gcode of CJ (20-40% limited) on FOTO shoulder in order to demonstrate true functional improvement. MET    PLAN     Plan of care Certification: 2/17/23 to 3/31/23.    Cont POC    Dada Saldivar, PT

## 2023-02-22 ENCOUNTER — CLINICAL SUPPORT (OUTPATIENT)
Dept: REHABILITATION | Facility: HOSPITAL | Age: 23
End: 2023-02-22
Attending: ORTHOPAEDIC SURGERY
Payer: MEDICAID

## 2023-02-22 DIAGNOSIS — S43.431D SUPERIOR GLENOID LABRUM LESION OF RIGHT SHOULDER, SUBSEQUENT ENCOUNTER: ICD-10-CM

## 2023-02-22 DIAGNOSIS — M25.611 DECREASED RIGHT SHOULDER RANGE OF MOTION: Primary | ICD-10-CM

## 2023-02-22 DIAGNOSIS — R29.898 WEAKNESS OF SHOULDER: ICD-10-CM

## 2023-02-22 PROCEDURE — 97110 THERAPEUTIC EXERCISES: CPT | Mod: PO

## 2023-02-22 NOTE — PROGRESS NOTES
[facility]   Physical Therapy Treatment Note    Name: Long Brunner  Clinic Number: [id number]    Therapy Diagnosis:   Encounter Diagnoses   Name Primary?    Decreased right shoulder range of motion Yes    Superior glenoid labrum lesion of right shoulder, subsequent encounter     Weakness of shoulder      Physician: Tj Fletcher IV, MD    Visit Date: 2/22/2023    Physician Orders: PT Eval and Treat   Medical Diagnosis from Referral: Superior glenoid labrum lesion of right shoulder, subsequent encounter [S43.431D]  Evaluation Date: 11/9/2022  Authorization Period Expiration: 4/12/23  Plan of Care Expiration: NEW 3/31/23  Progress Note Due: 3/17/23  Visit # / Visits authorized: 14/15  FOTO: 4/3 EPISODE COMPLETED AND CLOSED  PTA Visit #: 0/5     Precautions: Standard, POST OP  (surgery date 11/1/22)    Time In: 10:45 am  Time Out: 11:40 am  Total Billable Time: 55 mins    ROM: start PROM at 3 weeks, limit flexion 90', IR 45', abdxn 90';    at 6 weeks begin active/active assisted ROM; passive ROM to tolerance, goal full ER, 135' flexion, 120' abduction;   at 12 weeks full ROM   Immobilizer: on at all times except hygiene and therapy for 6 weeks then d/c after 6 weeks   Exercises: 0-3 wks elbow/wrist  only;   3-6 wks begin PROM activities with posterior capsule mobilizations, closed chain scapula;   8 weeks begin deltoid/rotator cuff isometrics;   12 weeks, work on glenohumeral stabilization, cycling/running at 12 weeks;   4-5 months scapular stabilization, eccentric strengthening, plyometrics, endurance.      Per surgeon note on 12/14/22: Plan for return work now, but needs to avoid overhead activity and heavy lifting over 10 lbs until 3 months after surgery on the surgical arm.  Plan for return to throwing program to start at physical therapy at 3 months after surgery.    The following procedures were performed in the Right Shoulder:  Arthroscopic Shoulder Debridement - Extensive  "([id number])  Arthroscopic Posterior Labral Repair ([id number])  Arthroscopic Superior Labrum (SLAP) Repair ([id number])         SUBJECTIVE     Pt reports: no increased pain or complaints    He was compliant with home exercise program.  Response to previous treatment: felt fine  Functional change: ongoing    Pain: 0/10  Location: right SLAP, rotator cuff debridement, posterior labral repair    OBJECTIVE     Not performed    TREATMENT      Long received the treatments listed below:    Bold = performed     All billed as therex based on medicaid guidelines  therapeutic exercises to develop strength, endurance, ROM, flexibility, and posture for 45 minutes including:    IR/ER eccentrics in SL with 4# weight - 10x10s   IR/ER eccentrics in SL with red monster band 2x10  Face pulls with external rotation and overhead press GTB 2x12  Standing IR @90 degrees 3x45s fast pace with Blue theraband   +  press on smith press - +20# x10  Downward dog > vinyasa - 2x10   Forward crawling - 2 laps     Every minute on the minute for 9 mins:  Minute 1 - Ball toss in throwing motion against trampoline   Minute 2 - Prone on physio T's - ball toss red and contralateral iso hold 2#   Minute 3 - Ball plyo on wall     Self ER mobilization with mob band 10x5s  Supine external rotation MR - 2x10   Supine Abd with MR - 2x10   Overhead carries reverse KB 15# 2 big laps around gym   Lateral hand walks over 6" box in plank position x10   Body blade D2 pattern x15 with slow pace  +supine lat eccentric with 4# dowel 15x5"   Yellow med ball on wall ABCs, A-Z x2 each R   IR/ER @ 90 degrees with cables 2x10 7# both ways  wall clocks (1, 3, and 5) with yellow theraband at wrist 2x5   Push up with plus on the wall x15  shoulder PNF D2 (sword from pocket) with yellow theraband 2x8  prone Y,T,I 2# 2x8    manual therapy techniques: Joint mobilizations and Soft tissue Mobilization were applied for 10 minutes, including:  Contract relax ER and abduction  Prom into " abduction and flexion blocking scapular movement   Teres pin and stretch   [place] STM to lats and teres    PATIENT EDUCATION AND HOME EXERCISES     Home Exercises Provided and Patient Education Provided     Education provided:   - eduction on condition  - home exercise program     Written Home Exercises Provided: yes. Exercises were reviewed and Long was able to demonstrate them prior to the end of the session.  Long demonstrated good  understanding of the education provided. See EMR under Patient Instructions for exercises provided during therapy sessions.    ASSESSMENT   Added crawling today to progress shoulder stability in the CKC position. Standing ER ROM @90 degrees continues to slowly improve but is still limited at this time.     Long is progressing towards his goals.   Pt prognosis is Good.     Pt will continue to benefit from skilled outpatient physical therapy to address the deficits listed in the problem list box on initial evaluation, provide pt/family education and to maximize pt's level of independence in the home and community environment.     Pt's spiritual, cultural and educational needs considered and pt agreeable to plan of care and goals.     Anticipated barriers to physical therapy: scheduling    Goals:   Short Term Goals:  5 weeks   1.Report decreased R shoulder pain < / =  0/10  to increase tolerance for daily activity MET  2. Increase PROM to within protocol guidelines MET  3. Increased strength by 1/3 MMT grade in R UE to increase tolerance for ADL and work activities. MET  4. Pt to tolerate HEP to improve ROM and independence with ADL's MET     Long Term Goals: 15 weeks  1.Pt to like 20 lbs without increase in pain for return to work MET  2.Increase AROM to equal to left Progressing   3.Increase strength to >/= 4/5 in R UE to increase tolerance for ADL and work activities. MET  4. Pt goal: Pt to perform weighted overhead activities without increase in pain to be able to return to softball  MET  5. Pt will have improved gcode of CJ (20-40% limited) on FOTO shoulder in order to demonstrate true functional improvement. MET    PLAN     Plan of care Certification: 2/17/23 to 3/31/23.    Cont POC    Dada Saldivar, PT

## 2023-02-24 ENCOUNTER — CLINICAL SUPPORT (OUTPATIENT)
Dept: REHABILITATION | Facility: HOSPITAL | Age: 23
End: 2023-02-24
Attending: ORTHOPAEDIC SURGERY
Payer: MEDICAID

## 2023-02-24 DIAGNOSIS — S43.431D SUPERIOR GLENOID LABRUM LESION OF RIGHT SHOULDER, SUBSEQUENT ENCOUNTER: ICD-10-CM

## 2023-02-24 DIAGNOSIS — M25.611 DECREASED RIGHT SHOULDER RANGE OF MOTION: Primary | ICD-10-CM

## 2023-02-24 DIAGNOSIS — R29.898 WEAKNESS OF SHOULDER: ICD-10-CM

## 2023-02-24 PROCEDURE — 97110 THERAPEUTIC EXERCISES: CPT | Mod: PO

## 2023-02-24 NOTE — PROGRESS NOTES
[facility]   Physical Therapy Treatment Note    Name: Long Brunner  Clinic Number: [id number]    Therapy Diagnosis:   Encounter Diagnoses   Name Primary?    Decreased right shoulder range of motion Yes    Superior glenoid labrum lesion of right shoulder, subsequent encounter     Weakness of shoulder      Physician: Tj Fletcher IV, MD    Visit Date: 2/24/2023    Physician Orders: PT Eval and Treat   Medical Diagnosis from Referral: Superior glenoid labrum lesion of right shoulder, subsequent encounter [S43.431D]  Evaluation Date: 11/9/2022  Authorization Period Expiration: 4/12/23  Plan of Care Expiration: NEW 3/31/23  Progress Note Due: 3/17/23  Visit # / Visits authorized: 15/15  FOTO: 4/3 EPISODE COMPLETED AND CLOSED  PTA Visit #: 0/5     Precautions: Standard, POST OP  (surgery date 11/1/22)    Time In: 8:30 am  Time Out: 9:17 am  Total Billable Time: 47 mins    ROM: start PROM at 3 weeks, limit flexion 90', IR 45', abdxn 90';    at 6 weeks begin active/active assisted ROM; passive ROM to tolerance, goal full ER, 135' flexion, 120' abduction;   at 12 weeks full ROM   Immobilizer: on at all times except hygiene and therapy for 6 weeks then d/c after 6 weeks   Exercises: 0-3 wks elbow/wrist  only;   3-6 wks begin PROM activities with posterior capsule mobilizations, closed chain scapula;   8 weeks begin deltoid/rotator cuff isometrics;   12 weeks, work on glenohumeral stabilization, cycling/running at 12 weeks;   4-5 months scapular stabilization, eccentric strengthening, plyometrics, endurance.      Per surgeon note on 12/14/22: Plan for return work now, but needs to avoid overhead activity and heavy lifting over 10 lbs until 3 months after surgery on the surgical arm.  Plan for return to throwing program to start at physical therapy at 3 months after surgery.    The following procedures were performed in the Right Shoulder:  Arthroscopic Shoulder Debridement - Extensive  "([id number])  Arthroscopic Posterior Labral Repair ([id number])  Arthroscopic Superior Labrum (SLAP) Repair ([id number])         SUBJECTIVE     Pt reports: sore today in post shoulder, but no c/o pain     He was compliant with home exercise program.  Response to previous treatment: felt fine  Functional change: ongoing    Pain: 0/10  Location: right SLAP, rotator cuff debridement, posterior labral repair    OBJECTIVE     Not performed    TREATMENT      Alves received the treatments listed below:    Bold = performed     All billed as therex based on medicaid guidelines  therapeutic exercises to develop strength, endurance, ROM, flexibility, and posture for 31 minutes including:    Supine external rotation MR- 2x10   Supine Abd with MR - 2x10   Self ER and IR mobilization with mob band 10x5s  Prone 90/90 ER 3x10 2#   1/2 kneeling overhead press with reverse 10# KB   Body blade D2 pattern x15 with slow pace  Ball toss with pt in 1/2 kneeling in throwing position   Med ball toss 4# 3x10   Med ball slams 4# 3x10       +  press on smith press - +20# x10  Downward dog > vinyasa - 2x10   Forward crawling - 2 laps     Every minute on the minute for 9 mins:  Minute 1 - Ball toss in throwing motion against trampoline   Minute 2 - Prone on physio T's - ball toss red and contralateral iso hold 2#   Minute 3 - Ball plyo on wall       IR/ER eccentrics in SL with 4# weight - 10x10s   IR/ER eccentrics in SL with red monster band 2x10  Face pulls with external rotation and overhead press GTB 2x12  Standing IR @90 degrees 3x45s fast pace with Blue theraband   Overhead carries reverse KB 15# 2 big laps around gym   Lateral hand walks over 6" box in plank position x10   +supine lat eccentric with 4# dowel 15x5"   Yellow med ball on wall ABCs, A-Z x2 each R   IR/ER @ 90 degrees with cables 2x10 7# both ways  wall clocks (1, 3, and 5) with yellow theraband at wrist 2x5   Push up with plus on the wall x15  shoulder PNF D2 (sword from pocket) with " yellow theraband 2x8  prone Y,T,I 2# 2x8    manual therapy techniques: Joint mobilizations and Soft tissue Mobilization were applied for 15 minutes, including:  GHJ posterior, inferior, anterior mobs - Grades 3-4   ER @90 contract relax      PATIENT EDUCATION AND HOME EXERCISES     Home Exercises Provided and Patient Education Provided     Education provided:   - eduction on condition  - home exercise program     Written Home Exercises Provided: yes. Exercises were reviewed and Long was able to demonstrate them prior to the end of the session.  Long demonstrated good  understanding of the education provided. See EMR under Patient Instructions for exercises provided during therapy sessions.    ASSESSMENT   Added Med ball slams and throws to progress the pt today with plyometrics. Doing well per POC.     Long is progressing towards his goals.   Pt prognosis is Good.     Pt will continue to benefit from skilled outpatient physical therapy to address the deficits listed in the problem list box on initial evaluation, provide pt/family education and to maximize pt's level of independence in the home and community environment.     Pt's spiritual, cultural and educational needs considered and pt agreeable to plan of care and goals.     Anticipated barriers to physical therapy: scheduling    Goals:   Short Term Goals:  5 weeks   1.Report decreased R shoulder pain < / =  0/10  to increase tolerance for daily activity MET  2. Increase PROM to within protocol guidelines MET  3. Increased strength by 1/3 MMT grade in R UE to increase tolerance for ADL and work activities. MET  4. Pt to tolerate HEP to improve ROM and independence with ADL's MET     Long Term Goals: 15 weeks  1.Pt to like 20 lbs without increase in pain for return to work MET  2.Increase AROM to equal to left Progressing   3.Increase strength to >/= 4/5 in R UE to increase tolerance for ADL and work activities. MET  4. Pt goal: Pt to perform weighted overhead  activities without increase in pain to be able to return to softball MET  5. Pt will have improved gcode of CJ (20-40% limited) on FOTO shoulder in order to demonstrate true functional improvement. MET    PLAN     Plan of care Certification: 2/17/23 to 3/31/23.    Cont POC    Dada Saldivar, PT

## 2023-02-27 ENCOUNTER — CLINICAL SUPPORT (OUTPATIENT)
Dept: REHABILITATION | Facility: HOSPITAL | Age: 23
End: 2023-02-27
Attending: ORTHOPAEDIC SURGERY
Payer: MEDICAID

## 2023-02-27 DIAGNOSIS — M25.611 DECREASED RIGHT SHOULDER RANGE OF MOTION: Primary | ICD-10-CM

## 2023-02-27 DIAGNOSIS — R29.898 WEAKNESS OF SHOULDER: ICD-10-CM

## 2023-02-27 DIAGNOSIS — S43.431D SUPERIOR GLENOID LABRUM LESION OF RIGHT SHOULDER, SUBSEQUENT ENCOUNTER: ICD-10-CM

## 2023-02-27 PROCEDURE — 97110 THERAPEUTIC EXERCISES: CPT | Mod: PO

## 2023-02-27 NOTE — PROGRESS NOTES
[facility]   Physical Therapy Treatment Note    Name: Long Brunner  Clinic Number: [id number]    Therapy Diagnosis:   Encounter Diagnoses   Name Primary?    Decreased right shoulder range of motion Yes    Superior glenoid labrum lesion of right shoulder, subsequent encounter     Weakness of shoulder        Physician: Tj Fletcher IV, MD    Visit Date: 2/27/2023    Physician Orders: PT Eval and Treat   Medical Diagnosis from Referral: Superior glenoid labrum lesion of right shoulder, subsequent encounter [S43.431D]  Evaluation Date: 11/9/2022  Authorization Period Expiration: 4/12/23  Plan of Care Expiration: NEW 3/31/23  Progress Note Due: 3/17/23  Visit # / Visits authorized: 16/15 (auth pending)  FOTO: 4/3 EPISODE COMPLETED AND CLOSED  PTA Visit #: 0/5     Precautions: Standard, POST OP  (surgery date 11/1/22)    Time In: 8:30 am  Time Out: 9:30 am  Total Billable Time: 60 mins    ROM: start PROM at 3 weeks, limit flexion 90', IR 45', abdxn 90';    at 6 weeks begin active/active assisted ROM; passive ROM to tolerance, goal full ER, 135' flexion, 120' abduction;   at 12 weeks full ROM   Immobilizer: on at all times except hygiene and therapy for 6 weeks then d/c after 6 weeks   Exercises: 0-3 wks elbow/wrist  only;   3-6 wks begin PROM activities with posterior capsule mobilizations, closed chain scapula;   8 weeks begin deltoid/rotator cuff isometrics;   12 weeks, work on glenohumeral stabilization, cycling/running at 12 weeks;   4-5 months scapular stabilization, eccentric strengthening, plyometrics, endurance.      Per surgeon note on 12/14/22: Plan for return work now, but needs to avoid overhead activity and heavy lifting over 10 lbs until 3 months after surgery on the surgical arm.  Plan for return to throwing program to start at physical therapy at 3 months after surgery.    The following procedures were performed in the Right Shoulder:  Arthroscopic Shoulder Debridement -  "Extensive (74901)  Arthroscopic Posterior Labral Repair ([id number])  Arthroscopic Superior Labrum (SLAP) Repair (04087)         SUBJECTIVE     Pt reports: no soreness today. He has been able to swing his bat with no limitations     He was compliant with home exercise program.  Response to previous treatment: felt fine  Functional change: ongoing    Pain: 0/10  Location: right SLAP, rotator cuff debridement, posterior labral repair    OBJECTIVE     Not performed    TREATMENT      Alves received the treatments listed below:    Bold = performed     All billed as therex based on medicaid guidelines  therapeutic exercises to develop strength, endurance, ROM, flexibility, and posture for 45 minutes including:    Supine Dowel 5# 2x10   IR/ER eccentrics in SL with 4# weight - 10x10s   IR/ER eccentrics in SL with red monster band 2x10  Face pulls with external rotation and overhead press BTB 3x10   press on smith press - +40#  3x8-10  Forward and reverse crawling - 2 laps ea      Every minute on the minute for 9 mins:  Minute 1 - Ball toss in throwing motion against trampoline   Minute 2 - Prone on physio T's - ball toss red and contralateral iso hold 2#   Minute 3 - Ball plyo on wall       Med ball toss in batting position 10# 3x10   Med ball slams 10# 3x10   Supine external rotation MR- 2x10   Supine Abd with MR - 2x10   Self ER and IR mobilization with mob band 10x5s  Prone 90/90 ER 3x10 2#   1/2 kneeling overhead press with reverse 10# KB   Body blade D2 pattern x15 with slow pace  Ball toss with pt in 1/2 kneeling in throwing position   Downward dog > vinyasa - 2x10   Standing IR @90 degrees 3x45s fast pace with Blue theraband   Overhead carries reverse KB 15# 2 big laps around gym   Lateral hand walks over 6" box in plank position x10   +supine lat eccentric with 4# dowel 15x5"   Yellow med ball on wall ABCs, A-Z x2 each R   IR/ER @ 90 degrees with cables 2x10 7# both ways  wall clocks (1, 3, and 5) with yellow " theraband at wrist 2x5   Push up with plus on the wall x15  shoulder PNF D2 (sword from pocket) with yellow theraband 2x8  prone Y,T,I 2# 2x8    manual therapy techniques: Joint mobilizations and Soft tissue Mobilization were applied for 15 minutes, including:  Sub scap and pec minor release  Teres pin and stretch  Lat and teres IASTM   Cupping to lat and teres with Y's       PATIENT EDUCATION AND HOME EXERCISES     Home Exercises Provided and Patient Education Provided     Education provided:   - eduction on condition  - home exercise program     Written Home Exercises Provided: yes. Exercises were reviewed and Long was able to demonstrate them prior to the end of the session.  Long demonstrated good  understanding of the education provided. See EMR under Patient Instructions for exercises provided during therapy sessions.    ASSESSMENT   Progressed weight on nuñez press and added reverse crawling to continue to challenge shoulder stability. Pt able to achieve full 90/90 position against wall today for the first time. He is likely ready to begin light throwing and was instructed to bring his glove in on 3/6 to initiate throwing drills.     Long is progressing towards his goals.   Pt prognosis is Good.     Pt will continue to benefit from skilled outpatient physical therapy to address the deficits listed in the problem list box on initial evaluation, provide pt/family education and to maximize pt's level of independence in the home and community environment.     Pt's spiritual, cultural and educational needs considered and pt agreeable to plan of care and goals.     Anticipated barriers to physical therapy: scheduling    Goals:   Short Term Goals:  5 weeks   1.Report decreased R shoulder pain < / =  0/10  to increase tolerance for daily activity MET  2. Increase PROM to within protocol guidelines MET  3. Increased strength by 1/3 MMT grade in R UE to increase tolerance for ADL and work activities. MET  4. Pt to  tolerate HEP to improve ROM and independence with ADL's MET     Long Term Goals: 15 weeks  1.Pt to like 20 lbs without increase in pain for return to work MET  2.Increase AROM to equal to left Progressing   3.Increase strength to >/= 4/5 in R UE to increase tolerance for ADL and work activities. MET  4. Pt goal: Pt to perform weighted overhead activities without increase in pain to be able to return to softball MET  5. Pt will have improved gcode of CJ (20-40% limited) on FOTO shoulder in order to demonstrate true functional improvement. MET    PLAN     Plan of care Certification: 2/17/23 to 3/31/23.    We will have the patient bring his glove for his visit on 3/6/23 to begin light throwing     Dada Saldivar PT

## 2023-03-01 ENCOUNTER — CLINICAL SUPPORT (OUTPATIENT)
Dept: REHABILITATION | Facility: HOSPITAL | Age: 23
End: 2023-03-01
Attending: ORTHOPAEDIC SURGERY
Payer: MEDICAID

## 2023-03-01 DIAGNOSIS — M25.611 DECREASED RIGHT SHOULDER RANGE OF MOTION: Primary | ICD-10-CM

## 2023-03-01 DIAGNOSIS — R29.898 WEAKNESS OF SHOULDER: ICD-10-CM

## 2023-03-01 DIAGNOSIS — S43.431D SUPERIOR GLENOID LABRUM LESION OF RIGHT SHOULDER, SUBSEQUENT ENCOUNTER: ICD-10-CM

## 2023-03-01 PROCEDURE — 97110 THERAPEUTIC EXERCISES: CPT | Mod: PO

## 2023-03-01 NOTE — PROGRESS NOTES
[facility]   Physical Therapy Treatment Note    Name: Long Brunner  Clinic Number: [id number]    Therapy Diagnosis:   Encounter Diagnoses   Name Primary?    Decreased right shoulder range of motion Yes    Superior glenoid labrum lesion of right shoulder, subsequent encounter     Weakness of shoulder          Physician: Tj Fletcher IV, MD    Visit Date: 3/1/2023    Physician Orders: PT Eval and Treat   Medical Diagnosis from Referral: Superior glenoid labrum lesion of right shoulder, subsequent encounter [S43.431D]  Evaluation Date: 11/9/2022  Authorization Period Expiration: 4/12/23  Plan of Care Expiration: NEW 3/31/23  Progress Note Due: 3/17/23  Visit # / Visits authorized: 17/15 (auth pending)  FOTO: 4/3 EPISODE COMPLETED AND CLOSED  PTA Visit #: 0/5     Precautions: Standard, POST OP  (surgery date 11/1/22)    Time In: 8:00  Time Out: 8:38  Total Billable Time: 38 mins    The following procedures were performed in the Right Shoulder:  Arthroscopic Shoulder Debridement - Extensive (45132)  Arthroscopic Posterior Labral Repair ([id number])  Arthroscopic Superior Labrum (SLAP) Repair ([id number])         SUBJECTIVE     Pt reports: has some soreness due to how he slept last night    He was compliant with home exercise program.  Response to previous treatment: felt fine  Functional change: ongoing    Pain: 0/10  Location: right SLAP, rotator cuff debridement, posterior labral repair    OBJECTIVE     Not performed    TREATMENT      Long received the treatments listed below:    Bold = performed     All billed as therex based on medicaid guidelines  therapeutic exercises to develop strength, endurance, ROM, flexibility, and posture for 38 minutes including:    Supine Dowel 5# 2x10   IR/ER eccentrics in SL with 4# weight - 10x10s   IR/ER eccentrics in SL with red monster band 2x10  Face pulls with external rotation and overhead press BTB 3x10   press on smith press - +40#  3x8-10  Forward and  "reverse crawling - 2 laps ea      Every minute on the minute for 9 mins:  Minute 1 - Ball toss in throwing motion against trampoline   Minute 2 - Prone on physio T's - ball toss red and contralateral iso hold 2#   Minute 3 - Ball plyo on wall       Med ball toss in batting position 10# 3x10   Med ball slams 10# 3x10   Supine external rotation MR- 2x10   Supine Abd with MR - 2x10   Self ER and IR mobilization with mob band 10x5s  Prone 90/90 ER 3x10 2#   1/2 kneeling overhead press with reverse 10# KB   Body blade D2 pattern x15 with slow pace  Ball toss with pt in 1/2 kneeling in throwing position   Downward dog > vinyasa - 2x10   Standing IR @90 degrees 3x45s fast pace with Blue theraband   Overhead carries reverse KB 15# 2 big laps around gym   Lateral hand walks over 6" box in plank position x10   +supine lat eccentric with 4# dowel 15x5"   Yellow med ball on wall ABCs, A-Z x2 each R   IR/ER @ 90 degrees with cables 2x10 7# both ways  wall clocks (1, 3, and 5) with yellow theraband at wrist 2x5   Push up with plus on the wall x15  shoulder PNF D2 (sword from pocket) with yellow theraband 2x8  prone Y,T,I 2# 2x8    manual therapy techniques: Joint mobilizations and Soft tissue Mobilization were applied for 0 minutes, including:  Sub scap and pec minor release  Teres pin and stretch  Lat and teres IASTM   Cupping to lat and teres with Y's       PATIENT EDUCATION AND HOME EXERCISES     Home Exercises Provided and Patient Education Provided     Education provided:   - eduction on condition  - home exercise program     Written Home Exercises Provided: yes. Exercises were reviewed and Long was able to demonstrate them prior to the end of the session.  Alves demonstrated good  understanding of the education provided. See EMR under Patient Instructions for exercises provided during therapy sessions.    ASSESSMENT   Long continues to tolerate sessions well. Plan to begin throwing at next visit. Progress as tolerated, "     Long is progressing towards his goals.   Pt prognosis is Good.     Pt will continue to benefit from skilled outpatient physical therapy to address the deficits listed in the problem list box on initial evaluation, provide pt/family education and to maximize pt's level of independence in the home and community environment.     Pt's spiritual, cultural and educational needs considered and pt agreeable to plan of care and goals.     Anticipated barriers to physical therapy: scheduling    Goals:   Short Term Goals:  5 weeks   1.Report decreased R shoulder pain < / =  0/10  to increase tolerance for daily activity MET  2. Increase PROM to within protocol guidelines MET  3. Increased strength by 1/3 MMT grade in R UE to increase tolerance for ADL and work activities. MET  4. Pt to tolerate HEP to improve ROM and independence with ADL's MET     Long Term Goals: 15 weeks  1.Pt to like 20 lbs without increase in pain for return to work MET  2.Increase AROM to equal to left Progressing   3.Increase strength to >/= 4/5 in R UE to increase tolerance for ADL and work activities. MET  4. Pt goal: Pt to perform weighted overhead activities without increase in pain to be able to return to softball MET  5. Pt will have improved gcode of CJ (20-40% limited) on FOTO shoulder in order to demonstrate true functional improvement. MET    PLAN     Plan of care Certification: 2/17/23 to 3/31/23.    We will have the patient bring his glove for his visit on 3/6/23 to begin light throwing     Alicia Ly, PT

## 2023-03-06 ENCOUNTER — CLINICAL SUPPORT (OUTPATIENT)
Dept: REHABILITATION | Facility: HOSPITAL | Age: 23
End: 2023-03-06
Attending: ORTHOPAEDIC SURGERY
Payer: MEDICAID

## 2023-03-06 DIAGNOSIS — M25.611 DECREASED RIGHT SHOULDER RANGE OF MOTION: Primary | ICD-10-CM

## 2023-03-06 DIAGNOSIS — S43.431D SUPERIOR GLENOID LABRUM LESION OF RIGHT SHOULDER, SUBSEQUENT ENCOUNTER: ICD-10-CM

## 2023-03-06 DIAGNOSIS — R29.898 WEAKNESS OF SHOULDER: ICD-10-CM

## 2023-03-06 PROCEDURE — 97110 THERAPEUTIC EXERCISES: CPT | Mod: PO

## 2023-03-06 NOTE — PROGRESS NOTES
[facility]   Physical Therapy Treatment Note    Name: Long Brunner  Clinic Number: [id number]    Therapy Diagnosis:   Encounter Diagnoses   Name Primary?    Decreased right shoulder range of motion Yes    Superior glenoid labrum lesion of right shoulder, subsequent encounter     Weakness of shoulder            Physician: Tj Fletcher IV, MD    Visit Date: 3/6/2023    Physician Orders: PT Eval and Treat   Medical Diagnosis from Referral: Superior glenoid labrum lesion of right shoulder, subsequent encounter [S43.431D]  Evaluation Date: 11/9/2022  Authorization Period Expiration: 4/12/23  Plan of Care Expiration: NEW 3/31/23  Progress Note Due: 3/17/23  Visit # / Visits authorized: 18/15 (auth pending)  FOTO: 4/3 EPISODE COMPLETED AND CLOSED  PTA Visit #: 0/5     Precautions: Standard, POST OP  (surgery date 11/1/22)    Time In: 8:30  Time Out: 9:30  Total Billable Time: 60 mins    The following procedures were performed in the Right Shoulder:  Arthroscopic Shoulder Debridement - Extensive (05525)  Arthroscopic Posterior Labral Repair ([id number])  Arthroscopic Superior Labrum (SLAP) Repair ([id number])         SUBJECTIVE     Pt reports: was very sore after PT last visit. Hip still conitnues to     He was compliant with home exercise program.  Response to previous treatment: felt fine  Functional change: ongoing    Pain: 0/10  Location: right SLAP, rotator cuff debridement, posterior labral repair    OBJECTIVE     Not performed    TREATMENT      Long received the treatments listed below:    Bold = performed     All billed as therex based on medicaid guidelines  therapeutic exercises to develop strength, endurance, ROM, flexibility, and posture for 52 minutes including:    Supine Dowel 5# 2x10   IR/ER eccentrics in SL with 4# weight - 10x10s   Self ER and IR mobilization with mob band 10x5s  Prone 90/90 ER 2x12 2#   Prone Y,T,I 2# 2x12  Face pulls with external rotation and overhead press BTB  "3x10  Forward and reverse crawling - 2 laps ea  Ball toss against trampoline 2x20   Throwing softball- warm up 25 feet, throwing 45 feet for 25 throws           Every minute on the minute for 9 mins:  Minute 1 - Ball toss in throwing motion against trampoline   Minute 2 - Prone on physio T's - ball toss red and contralateral iso hold 2#   Minute 3 - Ball plyo on wall         IR/ER eccentrics in SL with red monster band 2x10  Med ball toss in batting position 10# 3x10   Med ball slams 10# 3x10   Supine external rotation MR- 2x10   Supine Abd with MR - 2x10   1/2 kneeling overhead press with reverse 10# KB   Body blade D2 pattern x15 with slow pace  Ball toss with pt in 1/2 kneeling in throwing position   Downward dog > vinyasa - 2x10   Standing IR @90 degrees 3x45s fast pace with Blue theraband   Overhead carries reverse KB 15# 2 big laps around gym   Lateral hand walks over 6" box in plank position x10   +supine lat eccentric with 4# dowel 15x5"   Yellow med ball on wall ABCs, A-Z x2 each R   IR/ER @ 90 degrees with cables 2x10 7# both ways  wall clocks (1, 3, and 5) with yellow theraband at wrist 2x5   Push up with plus on the wall x15  shoulder PNF D2 (sword from pocket) with yellow theraband 2x8    manual therapy techniques: Joint mobilizations and Soft tissue Mobilization were applied for 08 minutes, including:  PNF contract relax followed by ER stretching 3x30s  Sub scap release   Pec minor release      PATIENT EDUCATION AND HOME EXERCISES     Home Exercises Provided and Patient Education Provided     Education provided:   - eduction on condition  - home exercise program     Written Home Exercises Provided: yes. Exercises were reviewed and Alves was able to demonstrate them prior to the end of the session.  Alves demonstrated good  understanding of the education provided. See EMR under Patient Instructions for exercises provided during therapy sessions.    ASSESSMENT   Alves continues to tolerate sessions well. " Added Level 1 of return to throwing program. Jose well with no pain and minimal limitations.     Long is progressing towards his goals.   Pt prognosis is Good.     Pt will continue to benefit from skilled outpatient physical therapy to address the deficits listed in the problem list box on initial evaluation, provide pt/family education and to maximize pt's level of independence in the home and community environment.     Pt's spiritual, cultural and educational needs considered and pt agreeable to plan of care and goals.     Anticipated barriers to physical therapy: scheduling    Goals:   Short Term Goals:  5 weeks   1.Report decreased R shoulder pain < / =  0/10  to increase tolerance for daily activity MET  2. Increase PROM to within protocol guidelines MET  3. Increased strength by 1/3 MMT grade in R UE to increase tolerance for ADL and work activities. MET  4. Pt to tolerate HEP to improve ROM and independence with ADL's MET     Long Term Goals: 15 weeks  1.Pt to like 20 lbs without increase in pain for return to work MET  2.Increase AROM to equal to left Progressing   3.Increase strength to >/= 4/5 in R UE to increase tolerance for ADL and work activities. MET  4. Pt goal: Pt to perform weighted overhead activities without increase in pain to be able to return to softball MET  5. Pt will have improved gcode of CJ (20-40% limited) on FOTO shoulder in order to demonstrate true functional improvement. MET    PLAN     Plan of care Certification: 2/17/23 to 3/31/23.    Continue with Lev 1 throwing this week. Progress to Lev 2 next week     Dada Saldivar PT

## 2023-03-08 ENCOUNTER — CLINICAL SUPPORT (OUTPATIENT)
Dept: REHABILITATION | Facility: HOSPITAL | Age: 23
End: 2023-03-08
Attending: ORTHOPAEDIC SURGERY
Payer: MEDICAID

## 2023-03-08 DIAGNOSIS — S43.431D SUPERIOR GLENOID LABRUM LESION OF RIGHT SHOULDER, SUBSEQUENT ENCOUNTER: ICD-10-CM

## 2023-03-08 DIAGNOSIS — R29.898 WEAKNESS OF SHOULDER: ICD-10-CM

## 2023-03-08 DIAGNOSIS — M25.611 DECREASED RIGHT SHOULDER RANGE OF MOTION: Primary | ICD-10-CM

## 2023-03-08 PROCEDURE — 97110 THERAPEUTIC EXERCISES: CPT | Mod: PO

## 2023-03-08 NOTE — PROGRESS NOTES
[facility]   Physical Therapy Treatment Note    Name: Long Brunner  Clinic Number: [id number]    Therapy Diagnosis:   Encounter Diagnoses   Name Primary?    Decreased right shoulder range of motion Yes    Superior glenoid labrum lesion of right shoulder, subsequent encounter     Weakness of shoulder              Physician: Tj Fletcher IV, MD    Visit Date: 3/8/2023    Physician Orders: PT Eval and Treat   Medical Diagnosis from Referral: Superior glenoid labrum lesion of right shoulder, subsequent encounter [S43.431D]  Evaluation Date: 11/9/2022  Authorization Period Expiration: 4/12/23  Plan of Care Expiration: NEW 3/31/23  Progress Note Due: 3/17/23  Visit # / Visits authorized: 19/15 (auth pending)  FOTO: 4/3 EPISODE COMPLETED AND CLOSED  PTA Visit #: 0/5     Precautions: Standard, POST OP  (surgery date 11/1/22)    Time In: 8:07  Time Out: 9:01  Total Billable Time: 54 mins    The following procedures were performed in the Right Shoulder:  Arthroscopic Shoulder Debridement - Extensive (38025)  Arthroscopic Posterior Labral Repair (19069)  Arthroscopic Superior Labrum (SLAP) Repair (25792)         SUBJECTIVE     Pt reports: not sore today.     He was compliant with home exercise program.  Response to previous treatment: felt fine  Functional change: ongoing    Pain: 0/10  Location: right SLAP, rotator cuff debridement, posterior labral repair    OBJECTIVE     Not performed    TREATMENT      Long received the treatments listed below:    Bold = performed     All billed as therex based on medicaid guidelines  therapeutic exercises to develop strength, endurance, ROM, flexibility, and posture for 44 minutes including:    Supine Dowel 5# 2x10   IR/ER eccentrics in SL with 4# weight - 10x10s   Self ER and IR mobilization with mob band 10x5s  Prone 90/90 ER 2x12 2#   Prone Y,T,I 2# 2x12  Face pulls with external rotation and overhead press BTB 3x10  Forward and reverse crawling - 2 laps  "ea  Ball toss against trampoline 2x20   Throwing softball- warm up 25 feet, throwing 45 feet for 25 throws           Every minute on the minute for 9 mins:  Minute 1 - Ball toss in throwing motion against trampoline   Minute 2 - Prone on physio T's - ball toss red and contralateral iso hold 2#   Minute 3 - Ball plyo on wall         IR/ER eccentrics in SL with red monster band 2x10  Med ball toss in batting position 10# 3x10   Med ball slams 10# 3x10   Supine external rotation MR- 2x10   Supine Abd with MR - 2x10   1/2 kneeling overhead press with reverse 10# KB   Body blade D2 pattern x15 with slow pace  Ball toss with pt in 1/2 kneeling in throwing position   Downward dog > vinyasa - 2x10   Standing IR @90 degrees 3x45s fast pace with Blue theraband   Overhead carries reverse KB 15# 2 big laps around gym   Lateral hand walks over 6" box in plank position x10   +supine lat eccentric with 4# dowel 15x5"   Yellow med ball on wall ABCs, A-Z x2 each R   IR/ER @ 90 degrees with cables 2x10 7# both ways  wall clocks (1, 3, and 5) with yellow theraband at wrist 2x5   Push up with plus on the wall x15  shoulder PNF D2 (sword from pocket) with yellow theraband 2x8    manual therapy techniques: Joint mobilizations and Soft tissue Mobilization were applied for 10 minutes, including:  PNF contract relax followed by ER stretching 3x30s  Sub scap release   Pec minor release      PATIENT EDUCATION AND HOME EXERCISES     Home Exercises Provided and Patient Education Provided     Education provided:   - eduction on condition  - home exercise program     Written Home Exercises Provided: yes. Exercises were reviewed and Long was able to demonstrate them prior to the end of the session.  Alves demonstrated good  understanding of the education provided. See EMR under Patient Instructions for exercises provided during therapy sessions.    ASSESSMENT   Long continues to tolerate sessions well. Some discomfort during manual therapy but no " pain following. Range of motion continues to improved with Long able to reach 90+ degrees of external rotation. Continued Level 1 of return to throwing program. Jose well with no pain and minimal limitations.     Long is progressing towards his goals.   Pt prognosis is Good.     Pt will continue to benefit from skilled outpatient physical therapy to address the deficits listed in the problem list box on initial evaluation, provide pt/family education and to maximize pt's level of independence in the home and community environment.     Pt's spiritual, cultural and educational needs considered and pt agreeable to plan of care and goals.     Anticipated barriers to physical therapy: scheduling    Goals:   Short Term Goals:  5 weeks   1.Report decreased R shoulder pain < / =  0/10  to increase tolerance for daily activity MET  2. Increase PROM to within protocol guidelines MET  3. Increased strength by 1/3 MMT grade in R UE to increase tolerance for ADL and work activities. MET  4. Pt to tolerate HEP to improve ROM and independence with ADL's MET     Long Term Goals: 15 weeks  1.Pt to like 20 lbs without increase in pain for return to work MET  2.Increase AROM to equal to left Progressing   3.Increase strength to >/= 4/5 in R UE to increase tolerance for ADL and work activities. MET  4. Pt goal: Pt to perform weighted overhead activities without increase in pain to be able to return to softball MET  5. Pt will have improved gcode of CJ (20-40% limited) on FOTO shoulder in order to demonstrate true functional improvement. MET    PLAN     Plan of care Certification: 2/17/23 to 3/31/23.    Continue with Lev 1 throwing this week. Progress to Lev 2 next week     Alicia Ly, PT

## 2023-03-13 ENCOUNTER — CLINICAL SUPPORT (OUTPATIENT)
Dept: REHABILITATION | Facility: HOSPITAL | Age: 23
End: 2023-03-13
Attending: ORTHOPAEDIC SURGERY
Payer: MEDICAID

## 2023-03-13 DIAGNOSIS — M25.611 DECREASED RIGHT SHOULDER RANGE OF MOTION: Primary | ICD-10-CM

## 2023-03-13 DIAGNOSIS — R29.898 WEAKNESS OF SHOULDER: ICD-10-CM

## 2023-03-13 DIAGNOSIS — S43.431D SUPERIOR GLENOID LABRUM LESION OF RIGHT SHOULDER, SUBSEQUENT ENCOUNTER: ICD-10-CM

## 2023-03-13 PROCEDURE — 97110 THERAPEUTIC EXERCISES: CPT | Mod: PO

## 2023-03-13 NOTE — PROGRESS NOTES
[facility]   Physical Therapy Treatment Note    Name: Long Brunner  Clinic Number: [id number]    Therapy Diagnosis:   Encounter Diagnoses   Name Primary?    Decreased right shoulder range of motion Yes    Superior glenoid labrum lesion of right shoulder, subsequent encounter     Weakness of shoulder      Physician: Tj Fletcher IV, MD    Visit Date: 3/13/2023    Physician Orders: PT Eval and Treat   Medical Diagnosis from Referral: Superior glenoid labrum lesion of right shoulder, subsequent encounter [S43.431D]  Evaluation Date: 11/9/2022  Authorization Period Expiration: 4/12/23  Plan of Care Expiration: NEW 3/31/23  Progress Note Due: 3/17/23  Visit # / Visits authorized: 14/22   FOTO: 4/3 EPISODE COMPLETED AND CLOSED  PTA Visit #: 0/5     Precautions: Standard, POST OP  (surgery date 11/1/22)    Time In: 8:30 am  Time Out: 9:20 am  Total Billable Time: 50 mins    The following procedures were performed in the Right Shoulder:  Arthroscopic Shoulder Debridement - Extensive (48158)  Arthroscopic Posterior Labral Repair (05080)  Arthroscopic Superior Labrum (SLAP) Repair (09378)         SUBJECTIVE     Pt reports: not sore today and doing great with throwing    He was compliant with home exercise program.  Response to previous treatment: felt fine  Functional change: ongoing    Pain: 0/10  Location: right SLAP, rotator cuff debridement, posterior labral repair    OBJECTIVE     Not performed    TREATMENT      Long received the treatments listed below:    Bold = performed     All billed as therex based on medicaid guidelines  therapeutic exercises to develop strength, endurance, ROM, flexibility, and posture for 50 minutes including:    Supine Dowel 5# 2x10   IR/ER eccentrics in SL with 4# weight - 10x10s   Self ER and IR mobilization with mob band 10x5s  Prone 90/90 ER 2x12 2#   Prone Y,T,I 2# 2x12  Face pulls with external rotation and overhead press BTB 3x10  Forward and reverse crawling -  "2 laps ea  Ball toss against trampoline 2x20   Throwing softball- warm up 25 feet, throwing 45 feet for 2 sets of 25 throws           Every minute on the minute for 9 mins:  Minute 1 - Ball toss in throwing motion against trampoline   Minute 2 - Prone on physio T's - ball toss red and contralateral iso hold 2#   Minute 3 - Ball plyo on wall         IR/ER eccentrics in SL with red monster band 2x10  Med ball toss in batting position 10# 3x10   Med ball slams 10# 3x10   Supine external rotation MR- 2x10   Supine Abd with MR - 2x10   1/2 kneeling overhead press with reverse 10# KB   Body blade D2 pattern x15 with slow pace  Ball toss with pt in 1/2 kneeling in throwing position   Downward dog > vinyasa - 2x10   Standing IR @90 degrees 3x45s fast pace with Blue theraband   Overhead carries reverse KB 15# 2 big laps around gym   Lateral hand walks over 6" box in plank position x10   +supine lat eccentric with 4# dowel 15x5"   Yellow med ball on wall ABCs, A-Z x2 each R   IR/ER @ 90 degrees with cables 2x10 7# both ways  wall clocks (1, 3, and 5) with yellow theraband at wrist 2x5   Push up with plus on the wall x15  shoulder PNF D2 (sword from pocket) with yellow theraband 2x8    manual therapy techniques: Joint mobilizations and Soft tissue Mobilization were applied for 00 minutes, including:  PNF contract relax followed by ER stretching 3x30s  Sub scap release   Pec minor release      PATIENT EDUCATION AND HOME EXERCISES     Home Exercises Provided and Patient Education Provided     Education provided:   - eduction on condition  - home exercise program     Written Home Exercises Provided: yes. Exercises were reviewed and Long was able to demonstrate them prior to the end of the session.  Long demonstrated good  understanding of the education provided. See EMR under Patient Instructions for exercises provided during therapy sessions.    ASSESSMENT   Long was able to progress with throwing today to 50 throws at 45 feet " with no pain or soreness. He was recently authorized for more visits so we scheduled him out until 4/12/23 which is when his auth expires. At that time he should be ready for D/C.     Long is progressing towards his goals.   Pt prognosis is Good.     Pt will continue to benefit from skilled outpatient physical therapy to address the deficits listed in the problem list box on initial evaluation, provide pt/family education and to maximize pt's level of independence in the home and community environment.     Pt's spiritual, cultural and educational needs considered and pt agreeable to plan of care and goals.     Anticipated barriers to physical therapy: scheduling    Goals:   Short Term Goals:  5 weeks   1.Report decreased R shoulder pain < / =  0/10  to increase tolerance for daily activity MET  2. Increase PROM to within protocol guidelines MET  3. Increased strength by 1/3 MMT grade in R UE to increase tolerance for ADL and work activities. MET  4. Pt to tolerate HEP to improve ROM and independence with ADL's MET     Long Term Goals: 15 weeks  1.Pt to like 20 lbs without increase in pain for return to work MET  2.Increase AROM to equal to left Progressing   3.Increase strength to >/= 4/5 in R UE to increase tolerance for ADL and work activities. MET  4. Pt goal: Pt to perform weighted overhead activities without increase in pain to be able to return to softball MET  5. Pt will have improved gcode of CJ (20-40% limited) on FOTO shoulder in order to demonstrate true functional improvement. MET    PLAN     Plan of care Certification: 2/17/23 to 3/31/23.    Progress to Lev 2 next week     Dada Saldivar PT

## 2023-03-15 ENCOUNTER — CLINICAL SUPPORT (OUTPATIENT)
Dept: REHABILITATION | Facility: HOSPITAL | Age: 23
End: 2023-03-15
Attending: ORTHOPAEDIC SURGERY
Payer: MEDICAID

## 2023-03-15 DIAGNOSIS — S43.431D SUPERIOR GLENOID LABRUM LESION OF RIGHT SHOULDER, SUBSEQUENT ENCOUNTER: ICD-10-CM

## 2023-03-15 DIAGNOSIS — R29.898 WEAKNESS OF SHOULDER: ICD-10-CM

## 2023-03-15 DIAGNOSIS — M25.611 DECREASED RIGHT SHOULDER RANGE OF MOTION: Primary | ICD-10-CM

## 2023-03-15 PROCEDURE — 97110 THERAPEUTIC EXERCISES: CPT | Mod: PO

## 2023-03-15 NOTE — PROGRESS NOTES
[facility]   Physical Therapy Treatment Note    Name: Long Brunner  Clinic Number: [id number]    Therapy Diagnosis:   Encounter Diagnoses   Name Primary?    Decreased right shoulder range of motion Yes    Superior glenoid labrum lesion of right shoulder, subsequent encounter     Weakness of shoulder        Physician: Tj Fletcher IV, MD    Visit Date: 3/15/2023    Physician Orders: PT Eval and Treat   Medical Diagnosis from Referral: Superior glenoid labrum lesion of right shoulder, subsequent encounter [S43.431D]  Evaluation Date: 11/9/2022  Authorization Period Expiration: 4/12/23  Plan of Care Expiration: NEW 3/31/23  Progress Note Due: 3/17/23  Visit # / Visits authorized: 14/22   FOTO: 4/3 EPISODE COMPLETED AND CLOSED  PTA Visit #: 0/5     Precautions: Standard, POST OP  (surgery date 11/1/22)    Time In: 8:06  Time Out: 8:55  Total Billable Time: 49 mins    The following procedures were performed in the Right Shoulder:  Arthroscopic Shoulder Debridement - Extensive (23260)  Arthroscopic Posterior Labral Repair (01144)  Arthroscopic Superior Labrum (SLAP) Repair (54706)         SUBJECTIVE     Pt reports: no soreness or pain    He was compliant with home exercise program.  Response to previous treatment: felt fine  Functional change: ongoing    Pain: 0/10  Location: right SLAP, rotator cuff debridement, posterior labral repair    OBJECTIVE     Not performed    TREATMENT      Long received the treatments listed below:    Bold = performed     All billed as therex based on medicaid guidelines  therapeutic exercises to develop strength, endurance, ROM, flexibility, and posture for 49 minutes including:    Supine Dowel 5# 2x10   IR/ER eccentrics in SL with 4# weight - 10x10s   Self ER and IR mobilization with mob band 10x5s  Prone 90/90 ER 2x12 2#   Prone Y,T,I 2# 2x12  Face pulls with external rotation and overhead press BTB 3x10  Forward and reverse crawling - 2 laps ea  Ball toss against  "trampoline 2x20   Throwing softball- warm up 25 feet, throwing 45 feet for 2 sets of 25 throws           Every minute on the minute for 9 mins:  Minute 1 - Ball toss in throwing motion against trampoline   Minute 2 - Prone on physio T's - ball toss red and contralateral iso hold 2#   Minute 3 - Ball plyo on wall         IR/ER eccentrics in SL with red monster band 2x10  Med ball toss in batting position 10# 3x10   Med ball slams 10# 3x10   Supine external rotation MR- 2x10   Supine Abd with MR - 2x10   1/2 kneeling overhead press with reverse 10# KB   Body blade D2 pattern x15 with slow pace  Ball toss with pt in 1/2 kneeling in throwing position   Downward dog > vinyasa - 2x10   Standing IR @90 degrees 3x45s fast pace with Blue theraband   Overhead carries reverse KB 15# 2 big laps around gym   Lateral hand walks over 6" box in plank position x10   +supine lat eccentric with 4# dowel 15x5"   Yellow med ball on wall ABCs, A-Z x2 each R   IR/ER @ 90 degrees with cables 2x10 7# both ways  wall clocks (1, 3, and 5) with yellow theraband at wrist 2x5   Push up with plus on the wall x15  shoulder PNF D2 (sword from pocket) with yellow theraband 2x8    manual therapy techniques: Joint mobilizations and Soft tissue Mobilization were applied for 00 minutes, including:  PNF contract relax followed by ER stretching 3x30s  Sub scap release   Pec minor release      PATIENT EDUCATION AND HOME EXERCISES     Home Exercises Provided and Patient Education Provided     Education provided:   - eduction on condition  - home exercise program     Written Home Exercises Provided: yes. Exercises were reviewed and Long was able to demonstrate them prior to the end of the session.  Long demonstrated good  understanding of the education provided. See EMR under Patient Instructions for exercises provided during therapy sessions.    ASSESSMENT   Long was able to continue with throwing today to 50 throws at 45 feet with no pain or soreness. He " is not having trouble with any exercises and is maintaining range of motion without issue.       Long is progressing towards his goals.   Pt prognosis is Good.     Pt will continue to benefit from skilled outpatient physical therapy to address the deficits listed in the problem list box on initial evaluation, provide pt/family education and to maximize pt's level of independence in the home and community environment.     Pt's spiritual, cultural and educational needs considered and pt agreeable to plan of care and goals.     Anticipated barriers to physical therapy: scheduling    Goals:   Short Term Goals:  5 weeks   1.Report decreased R shoulder pain < / =  0/10  to increase tolerance for daily activity MET  2. Increase PROM to within protocol guidelines MET  3. Increased strength by 1/3 MMT grade in R UE to increase tolerance for ADL and work activities. MET  4. Pt to tolerate HEP to improve ROM and independence with ADL's MET     Long Term Goals: 15 weeks  1.Pt to like 20 lbs without increase in pain for return to work MET  2.Increase AROM to equal to left Progressing   3.Increase strength to >/= 4/5 in R UE to increase tolerance for ADL and work activities. MET  4. Pt goal: Pt to perform weighted overhead activities without increase in pain to be able to return to softball MET  5. Pt will have improved gcode of CJ (20-40% limited) on FOTO shoulder in order to demonstrate true functional improvement. MET    PLAN     Plan of care Certification: 2/17/23 to 3/31/23.    Progress to Lev 2 next week     Alicia Ly, PT

## 2023-03-20 ENCOUNTER — CLINICAL SUPPORT (OUTPATIENT)
Dept: REHABILITATION | Facility: HOSPITAL | Age: 23
End: 2023-03-20
Attending: ORTHOPAEDIC SURGERY
Payer: MEDICAID

## 2023-03-20 DIAGNOSIS — R29.898 WEAKNESS OF SHOULDER: ICD-10-CM

## 2023-03-20 DIAGNOSIS — M25.611 DECREASED RIGHT SHOULDER RANGE OF MOTION: Primary | ICD-10-CM

## 2023-03-20 DIAGNOSIS — S43.431D SUPERIOR GLENOID LABRUM LESION OF RIGHT SHOULDER, SUBSEQUENT ENCOUNTER: ICD-10-CM

## 2023-03-20 PROCEDURE — 97110 THERAPEUTIC EXERCISES: CPT | Mod: PO

## 2023-03-20 NOTE — PROGRESS NOTES
[facility]   Physical Therapy Treatment Note    Name: Long Brunner  Clinic Number: [id number]    Therapy Diagnosis:   Encounter Diagnoses   Name Primary?    Decreased right shoulder range of motion Yes    Superior glenoid labrum lesion of right shoulder, subsequent encounter     Weakness of shoulder        Physician: Tj Fletcher IV, MD    Visit Date: 3/20/2023    Physician Orders: PT Eval and Treat   Medical Diagnosis from Referral: Superior glenoid labrum lesion of right shoulder, subsequent encounter [S43.431D]  Evaluation Date: 11/9/2022  Authorization Period Expiration: 4/12/23  Plan of Care Expiration: NEW 3/31/23  Progress Note Due: 3/17/23  Visit # / Visits authorized: 16/22   FOTO: 4/3 EPISODE COMPLETED AND CLOSED  PTA Visit #: 0/5     Precautions: Standard, POST OP  (surgery date 11/1/22)    Time In: 8:30  Time Out: 9:25  Total Billable Time: 55 mins    The following procedures were performed in the Right Shoulder:  Arthroscopic Shoulder Debridement - Extensive (98964)  Arthroscopic Posterior Labral Repair (66491)  Arthroscopic Superior Labrum (SLAP) Repair (89105)         SUBJECTIVE     Pt reports: no soreness or pain    He was compliant with home exercise program.  Response to previous treatment: felt fine  Functional change: ongoing    Pain: 0/10  Location: right SLAP, rotator cuff debridement, posterior labral repair    OBJECTIVE     Not performed    TREATMENT      Long received the treatments listed below:    Bold = performed     All billed as therex based on medicaid guidelines  therapeutic exercises to develop strength, endurance, ROM, flexibility, and posture for 45 minutes including:    Supine Dowel 5# 2x10   IR/ER eccentrics in SL with 5# weight - 10x10s   Self ER and IR mobilization with mob band 10x5s  Prone 90/90 ER 3x10 2#   Face pulls with external rotation and overhead press TRX straps 3x10  D2 extension on R imitating baseball swing   Standing ER/IR @90 degrees  "3x30s fast pace with Blue theraband   Med ball toss 10# x20  Throwing softball- warm up 25 feet, throwing 60 feet for 3 sets of 25 throws   Prone Y,T,I 2# 2x12  Forward and reverse crawling - 2 laps ea  Ball toss against trampoline 2x20           Every minute on the minute for 9 mins:  Minute 1 - Ball toss in throwing motion against trampoline   Minute 2 - Prone on physio T's - ball toss red and contralateral iso hold 2#   Minute 3 - Ball plyo on wall         IR/ER eccentrics in SL with red monster band 2x10  Med ball toss in batting position 10# 3x10   Med ball slams 10# 3x10   Supine external rotation MR- 2x10   Supine Abd with MR - 2x10   1/2 kneeling overhead press with reverse 10# KB   Body blade D2 pattern x15 with slow pace  Ball toss with pt in 1/2 kneeling in throwing position   Downward dog > vinyasa - 2x10   Overhead carries reverse KB 15# 2 big laps around gym   Lateral hand walks over 6" box in plank position x10   +supine lat eccentric with 4# dowel 15x5"   Yellow med ball on wall ABCs, A-Z x2 each R   IR/ER @ 90 degrees with cables 2x10 7# both ways  wall clocks (1, 3, and 5) with yellow theraband at wrist 2x5   Push up with plus on the wall x15  shoulder PNF D2 (sword from pocket) with yellow theraband 2x8    manual therapy techniques: Joint mobilizations and Soft tissue Mobilization were applied for 10 minutes, including:  PNF contract relax followed by ER stretching 3x30s  Sub scap release   Pec minor release      PATIENT EDUCATION AND HOME EXERCISES     Home Exercises Provided and Patient Education Provided     Education provided:   - eduction on condition  - home exercise program     Written Home Exercises Provided: yes. Exercises were reviewed and Alves was able to demonstrate them prior to the end of the session.  Alves demonstrated good  understanding of the education provided. See EMR under Patient Instructions for exercises provided during therapy sessions.    ASSESSMENT   Long was able to " continue with throwing today to 74 throws at 60 feet with no pain or soreness. He is not having trouble with any exercises and is maintaining range of motion without issue.       Long is progressing towards his goals.   Pt prognosis is Good.     Pt will continue to benefit from skilled outpatient physical therapy to address the deficits listed in the problem list box on initial evaluation, provide pt/family education and to maximize pt's level of independence in the home and community environment.     Pt's spiritual, cultural and educational needs considered and pt agreeable to plan of care and goals.     Anticipated barriers to physical therapy: scheduling    Goals:   Short Term Goals:  5 weeks   1.Report decreased R shoulder pain < / =  0/10  to increase tolerance for daily activity MET  2. Increase PROM to within protocol guidelines MET  3. Increased strength by 1/3 MMT grade in R UE to increase tolerance for ADL and work activities. MET  4. Pt to tolerate HEP to improve ROM and independence with ADL's MET     Long Term Goals: 15 weeks  1.Pt to like 20 lbs without increase in pain for return to work MET  2.Increase AROM to equal to left Progressing   3.Increase strength to >/= 4/5 in R UE to increase tolerance for ADL and work activities. MET  4. Pt goal: Pt to perform weighted overhead activities without increase in pain to be able to return to softball MET  5. Pt will have improved gcode of CJ (20-40% limited) on FOTO shoulder in order to demonstrate true functional improvement. MET    PLAN     Plan of care Certification: 2/17/23 to 3/31/23.    Cont to progress throwing program    Dada Saldivar PT

## 2023-03-23 ENCOUNTER — CLINICAL SUPPORT (OUTPATIENT)
Dept: REHABILITATION | Facility: HOSPITAL | Age: 23
End: 2023-03-23
Attending: ORTHOPAEDIC SURGERY
Payer: MEDICAID

## 2023-03-23 DIAGNOSIS — R29.898 WEAKNESS OF SHOULDER: ICD-10-CM

## 2023-03-23 DIAGNOSIS — M25.611 DECREASED RIGHT SHOULDER RANGE OF MOTION: Primary | ICD-10-CM

## 2023-03-23 DIAGNOSIS — S43.431D SUPERIOR GLENOID LABRUM LESION OF RIGHT SHOULDER, SUBSEQUENT ENCOUNTER: ICD-10-CM

## 2023-03-23 PROCEDURE — 97110 THERAPEUTIC EXERCISES: CPT | Mod: PO

## 2023-03-23 NOTE — PROGRESS NOTES
[facility]   Physical Therapy Treatment Note    Name: Long Brunner  Clinic Number: [id number]    Therapy Diagnosis:   Encounter Diagnoses   Name Primary?    Decreased right shoulder range of motion Yes    Superior glenoid labrum lesion of right shoulder, subsequent encounter     Weakness of shoulder        Physician: Tj Fletcher IV, MD    Visit Date: 3/23/2023    Physician Orders: PT Eval and Treat   Medical Diagnosis from Referral: Superior glenoid labrum lesion of right shoulder, subsequent encounter [S43.431D]  Evaluation Date: 11/9/2022  Authorization Period Expiration: 4/12/23  Plan of Care Expiration: NEW 3/31/23  Progress Note Due: 3/17/23  Visit # / Visits authorized: 17/22   FOTO: 4/3 EPISODE COMPLETED AND CLOSED  PTA Visit #: 0/5     Precautions: Standard, POST OP  (surgery date 11/1/22)    Time In: 8:00  Time Out: 8:53  Total Billable Time: 53 mins    The following procedures were performed in the Right Shoulder:  Arthroscopic Shoulder Debridement - Extensive (76882)  Arthroscopic Posterior Labral Repair (08348)  Arthroscopic Superior Labrum (SLAP) Repair ([id number])         SUBJECTIVE     Pt reports: no soreness or pain    He was compliant with home exercise program.  Response to previous treatment: felt fine  Functional change: ongoing    Pain: 0/10  Location: right SLAP, rotator cuff debridement, posterior labral repair    OBJECTIVE     Not performed    TREATMENT      Long received the treatments listed below:    Bold = performed     All billed as therex based on medicaid guidelines  therapeutic exercises to develop strength, endurance, ROM, flexibility, and posture for 53 minutes including:    Supine Dowel 5# 2x10   IR/ER eccentrics in SL with 5# weight - 10x10s   Self ER and IR mobilization with mob band 10x5s  Prone 90/90 ER 3x12 2#   Face pulls with external rotation and overhead press TRX straps 3x10  1/ kneeling OH press with 10# DB and band pulling resisting ER - 3x8  "  Body blade D2 pattern 2x10 with slow pace  Standing ER/IR @90 degrees 3x30s fast pace with Blue theraband   Med ball toss, baseball pass, slams 10# x20  Throwing softball- warm up 25 feet, throwing 60 feet for 3 sets of 25 throws   Prone Y,T,I 2# 2x12  Forward and reverse crawling - 2 laps ea  Ball toss against trampoline x30 red ball    Every minute on the minute for 9 mins:  Minute 1 - Ball toss in throwing motion against trampoline   Minute 2 - Prone on physio T's - ball toss red and contralateral iso hold 2#   Minute 3 - Ball plyo on wall         IR/ER eccentrics in SL with red monster band 2x10  Med ball toss in batting position 10# 3x10   Med ball slams 10# 3x10   Supine external rotation MR- 2x10   Supine Abd with MR - 2x10   1/2 kneeling overhead press with reverse 10# KB   Ball toss with pt in 1/2 kneeling in throwing position   Downward dog > vinyasa - 2x10   Overhead carries reverse KB 15# 2 big laps around gym   Lateral hand walks over 6" box in plank position x10   +supine lat eccentric with 4# dowel 15x5"   Yellow med ball on wall ABCs, A-Z x2 each R   IR/ER @ 90 degrees with cables 2x10 7# both ways  wall clocks (1, 3, and 5) with yellow theraband at wrist 2x5   Push up with plus on the wall x15  shoulder PNF D2 (sword from pocket) with yellow theraband 2x8    manual therapy techniques: Joint mobilizations and Soft tissue Mobilization were applied for 10 minutes, including:  PNF contract relax followed by ER stretching 3x30s  Sub scap release   Pec minor release      PATIENT EDUCATION AND HOME EXERCISES     Home Exercises Provided and Patient Education Provided     Education provided:   - eduction on condition  - home exercise program     Written Home Exercises Provided: yes. Exercises were reviewed and Alves was able to demonstrate them prior to the end of the session.  Alves demonstrated good  understanding of the education provided. See EMR under Patient Instructions for exercises provided during " therapy sessions.    ASSESSMENT   Long's throwing motion has improved significantly and is near normal with no pain. Progressed strengthening and plyometrics today with no issues. Likely ready for return to plan at the end of the scheduled visits.       Long is progressing towards his goals.   Pt prognosis is Good.     Pt will continue to benefit from skilled outpatient physical therapy to address the deficits listed in the problem list box on initial evaluation, provide pt/family education and to maximize pt's level of independence in the home and community environment.     Pt's spiritual, cultural and educational needs considered and pt agreeable to plan of care and goals.     Anticipated barriers to physical therapy: scheduling    Goals:   Short Term Goals:  5 weeks   1.Report decreased R shoulder pain < / =  0/10  to increase tolerance for daily activity MET  2. Increase PROM to within protocol guidelines MET  3. Increased strength by 1/3 MMT grade in R UE to increase tolerance for ADL and work activities. MET  4. Pt to tolerate HEP to improve ROM and independence with ADL's MET     Long Term Goals: 15 weeks  1.Pt to like 20 lbs without increase in pain for return to work MET  2.Increase AROM to equal to left Progressing   3.Increase strength to >/= 4/5 in R UE to increase tolerance for ADL and work activities. MET  4. Pt goal: Pt to perform weighted overhead activities without increase in pain to be able to return to softball MET  5. Pt will have improved gcode of CJ (20-40% limited) on FOTO shoulder in order to demonstrate true functional improvement. MET    PLAN     Plan of care Certification: 2/17/23 to 3/31/23.    Cont to progress throwing program    Dada Saldivar PT

## 2023-03-27 ENCOUNTER — CLINICAL SUPPORT (OUTPATIENT)
Dept: REHABILITATION | Facility: HOSPITAL | Age: 23
End: 2023-03-27
Attending: ORTHOPAEDIC SURGERY
Payer: MEDICAID

## 2023-03-27 DIAGNOSIS — S43.431D SUPERIOR GLENOID LABRUM LESION OF RIGHT SHOULDER, SUBSEQUENT ENCOUNTER: ICD-10-CM

## 2023-03-27 DIAGNOSIS — M25.611 DECREASED RIGHT SHOULDER RANGE OF MOTION: Primary | ICD-10-CM

## 2023-03-27 DIAGNOSIS — R29.898 WEAKNESS OF SHOULDER: ICD-10-CM

## 2023-03-27 PROCEDURE — 97110 THERAPEUTIC EXERCISES: CPT | Mod: PO

## 2023-03-27 NOTE — PROGRESS NOTES
[facility]   Physical Therapy Treatment Note    Name: Long Brunner  Clinic Number: [id number]    Therapy Diagnosis:   Encounter Diagnoses   Name Primary?    Decreased right shoulder range of motion Yes    Superior glenoid labrum lesion of right shoulder, subsequent encounter     Weakness of shoulder        Physician: Tj Fletcher IV, MD    Visit Date: 3/27/2023    Physician Orders: PT Eval and Treat   Medical Diagnosis from Referral: Superior glenoid labrum lesion of right shoulder, subsequent encounter [S43.431D]  Evaluation Date: 11/9/2022  Authorization Period Expiration: 4/12/23  Plan of Care Expiration: NEW 3/31/23  Progress Note Due: 3/17/23  Visit # / Visits authorized: 18/22   FOTO: 4/3 EPISODE COMPLETED AND CLOSED  PTA Visit #: 0/5     Precautions: Standard, POST OP  (surgery date 11/1/22)    Time In: 8:30  Time Out: 9:37  Total Billable Time: 57 mins    The following procedures were performed in the Right Shoulder:  Arthroscopic Shoulder Debridement - Extensive (71236)  Arthroscopic Posterior Labral Repair ([id number])  Arthroscopic Superior Labrum (SLAP) Repair ([id number])         SUBJECTIVE     Pt reports: no soreness or pain    He was compliant with home exercise program.  Response to previous treatment: felt fine  Functional change: ongoing    Pain: 0/10  Location: right SLAP, rotator cuff debridement, posterior labral repair    OBJECTIVE     Not performed    TREATMENT      Long received the treatments listed below:    Bold = performed     All billed as therex based on medicaid guidelines  therapeutic exercises to develop strength, endurance, ROM, flexibility, and posture for 57 minutes including:    Supine Dowel 5# 2x10   IR/ER eccentrics in SL with 5# weight - 10x10s   Self ER and IR mobilization with mob band 10x5s  Prone 90/90 ER 3x12 2#   Face pulls BTB 3x10  1/ kneeling OH press with 10# DB and band pulling resisting ER - 3x8   Body blade D2 pattern 2x10 with slow pace  Standing  "ER/IR @90 degrees 3x30s fast pace with Blue theraband   Med ball toss, baseball pass, slams 10# x20  Throwing softball- warm up 25 feet, throwing 60 feet for 3 sets of 25 throws   Prone Y,T,I 2# 2x12  Forward and reverse crawling - 2 laps ea  Ball toss against trampoline x30 red ball    Every minute on the minute for 9 mins:  Minute 1 - Ball toss in throwing motion against trampoline   Minute 2 - Prone on physio T's - ball toss red and contralateral iso hold 2#   Minute 3 - Ball plyo on wall         IR/ER eccentrics in SL with red monster band 2x10  Med ball toss in batting position 10# 3x10   Med ball slams 10# 3x10   Supine external rotation MR- 2x10   Supine Abd with MR - 2x10   1/2 kneeling overhead press with reverse 10# KB   Ball toss with pt in 1/2 kneeling in throwing position   Downward dog > vinyasa - 2x10   Overhead carries reverse KB 20# 2 big laps around gym   Lateral hand walks over 6" box in plank position x10   +supine lat eccentric with 4# dowel 15x5"   Yellow med ball on wall ABCs, A-Z x2 each R   IR/ER @ 90 degrees with cables 2x10 7# both ways  wall clocks (1, 3, and 5) with yellow theraband at wrist 2x5   Push up with plus on the wall x15  shoulder PNF D2 (sword from pocket) with yellow theraband 2x8    manual therapy techniques: Joint mobilizations and Soft tissue Mobilization were applied for 00 minutes, including:  PNF contract relax followed by ER stretching 3x30s  Sub scap release   Pec minor release      PATIENT EDUCATION AND HOME EXERCISES     Home Exercises Provided and Patient Education Provided     Education provided:   - eduction on condition  - home exercise program     Written Home Exercises Provided: yes. Exercises were reviewed and Alves was able to demonstrate them prior to the end of the session.  Alves demonstrated good  understanding of the education provided. See EMR under Patient Instructions for exercises provided during therapy sessions.    ASSESSMENT   Long's throwing " motion has improved significantly and is near normal with no pain and he reported today that it finally feels fully normal to throw. We progressed him to 75 feet throwing today which was no issues for the patient.       Long is progressing towards his goals.   Pt prognosis is Good.     Pt will continue to benefit from skilled outpatient physical therapy to address the deficits listed in the problem list box on initial evaluation, provide pt/family education and to maximize pt's level of independence in the home and community environment.     Pt's spiritual, cultural and educational needs considered and pt agreeable to plan of care and goals.     Anticipated barriers to physical therapy: scheduling    Goals:   Short Term Goals:  5 weeks   1.Report decreased R shoulder pain < / =  0/10  to increase tolerance for daily activity MET  2. Increase PROM to within protocol guidelines MET  3. Increased strength by 1/3 MMT grade in R UE to increase tolerance for ADL and work activities. MET  4. Pt to tolerate HEP to improve ROM and independence with ADL's MET     Long Term Goals: 15 weeks  1.Pt to like 20 lbs without increase in pain for return to work MET  2.Increase AROM to equal to left Progressing   3.Increase strength to >/= 4/5 in R UE to increase tolerance for ADL and work activities. MET  4. Pt goal: Pt to perform weighted overhead activities without increase in pain to be able to return to softball MET  5. Pt will have improved gcode of CJ (20-40% limited) on FOTO shoulder in order to demonstrate true functional improvement. MET    PLAN     Plan of care Certification: 2/17/23 to 3/31/23.    Cont to progress throwing program    Dada Saldivar PT

## 2023-03-30 ENCOUNTER — CLINICAL SUPPORT (OUTPATIENT)
Dept: REHABILITATION | Facility: HOSPITAL | Age: 23
End: 2023-03-30
Attending: ORTHOPAEDIC SURGERY
Payer: MEDICAID

## 2023-03-30 DIAGNOSIS — M25.611 DECREASED RIGHT SHOULDER RANGE OF MOTION: Primary | ICD-10-CM

## 2023-03-30 DIAGNOSIS — R29.898 WEAKNESS OF SHOULDER: ICD-10-CM

## 2023-03-30 DIAGNOSIS — S43.431D SUPERIOR GLENOID LABRUM LESION OF RIGHT SHOULDER, SUBSEQUENT ENCOUNTER: ICD-10-CM

## 2023-03-30 PROCEDURE — 97110 THERAPEUTIC EXERCISES: CPT | Mod: PO

## 2023-03-30 NOTE — PROGRESS NOTES
"[facility]   Physical Therapy Treatment Note    Name: Long Brunner  Clinic Number: [id number]    Therapy Diagnosis:   Encounter Diagnoses   Name Primary?    Decreased right shoulder range of motion Yes    Superior glenoid labrum lesion of right shoulder, subsequent encounter     Weakness of shoulder        Physician: Tj Fletcher IV, MD    Visit Date: 3/30/2023    Physician Orders: PT Eval and Treat   Medical Diagnosis from Referral: Superior glenoid labrum lesion of right shoulder, subsequent encounter [S43.431D]  Evaluation Date: 11/9/2022  Authorization Period Expiration: 4/12/23  Plan of Care Expiration: NEW 3/31/23  Progress Note Due: 3/17/23  Visit # / Visits authorized: 19/22   FOTO: 4/3 EPISODE COMPLETED AND CLOSED  PTA Visit #: 0/5     Precautions: Standard, POST OP  (surgery date 11/1/22)    Time In: 8:15 am (late arrival)  Time Out: 9:15 am  Total Billable Time: 60 mins    The following procedures were performed in the Right Shoulder:  Arthroscopic Shoulder Debridement - Extensive (12555)  Arthroscopic Posterior Labral Repair (71499)  Arthroscopic Superior Labrum (SLAP) Repair (19846)         SUBJECTIVE     Pt reports: no soreness or pain. Wants to know stretches that he can do before he throws to "loosen up"    He was compliant with home exercise program.  Response to previous treatment: felt fine  Functional change: ongoing    Pain: 0/10  Location: right SLAP, rotator cuff debridement, posterior labral repair    OBJECTIVE     Not performed    TREATMENT      Long received the treatments listed below:    Bold = performed     All billed as therex based on medicaid guidelines  therapeutic exercises to develop strength, endurance, ROM, flexibility, and posture for 60 minutes including:    Supine Dowel 5# 2x10   IR/ER eccentrics in SL with 5# weight - 10x10s   Self ER and IR mobilization with mob band 10x5s  Prone 90/90 ER 3x10 3#   Face pulls Black TB 2x12  1/ kneeling OH press with " "10# DB and band pulling resisting ER - 3x8   Body blade - 90 degrees ER 3x30s, D2 pattern 2x10 with slow pace  Standing ER/IR @90 degrees 3x30s fast pace with Blue theraband   Med ball toss: baseball pass, 12# x20  UE Dynamic 6 warm up - field goal, scarecrow, airplane, wings, reach arm crossed  Throwing softball- warm up 25 feet, throwing 90 feet for 3 sets of 25 throws   1/2 kneeling ball toss in throwing motion yellow ball - x20   Prone Y,T,I 2# 2x12  Forward and reverse crawling - 2 laps ea  Ball toss against trampoline x30    Every minute on the minute for 9 mins:  Minute 1 - Ball toss in throwing motion against trampoline   Minute 2 - Prone on physio T's - ball toss red and contralateral iso hold 2#   Minute 3 - Ball plyo on wall         IR/ER eccentrics in SL with red monster band 2x10  Med ball toss in batting position 10# 3x10   Med ball slams 10# 3x10   Supine external rotation MR- 2x10   Supine Abd with MR - 2x10   1/2 kneeling overhead press with reverse 10# KB   Ball toss with pt in 1/2 kneeling in throwing position   Downward dog > vinyasa - 2x10   Overhead carries reverse KB 20# 2 big laps around gym   Lateral hand walks over 6" box in plank position x10   +supine lat eccentric with 4# dowel 15x5"   Yellow med ball on wall ABCs, A-Z x2 each R   IR/ER @ 90 degrees with cables 2x10 7# both ways  wall clocks (1, 3, and 5) with yellow theraband at wrist 2x5   Push up with plus on the wall x15  shoulder PNF D2 (sword from pocket) with yellow theraband 2x8    manual therapy techniques: Joint mobilizations and Soft tissue Mobilization were applied for 00 minutes, including:  PNF contract relax followed by ER stretching 3x30s  Sub scap release   Pec minor release      PATIENT EDUCATION AND HOME EXERCISES     Home Exercises Provided and Patient Education Provided     Education provided:   - eduction on condition  - home exercise program     Written Home Exercises Provided: yes. Exercises were reviewed and Alves " was able to demonstrate them prior to the end of the session.  Long demonstrated good  understanding of the education provided. See EMR under Patient Instructions for exercises provided during therapy sessions.    ASSESSMENT   Long's throwing motion has improved significantly and is near normal with no pain and he reported today that it finally feels fully normal to throw. We progressed him to 90 feet throwing today which was no issues for the patient. He has 3 more authorized visit and should be appropriate for D/C on 4/12/23.      Long is progressing towards his goals.   Pt prognosis is Good.     Pt will continue to benefit from skilled outpatient physical therapy to address the deficits listed in the problem list box on initial evaluation, provide pt/family education and to maximize pt's level of independence in the home and community environment.     Pt's spiritual, cultural and educational needs considered and pt agreeable to plan of care and goals.     Anticipated barriers to physical therapy: scheduling    Goals:   Short Term Goals:  5 weeks   1.Report decreased R shoulder pain < / =  0/10  to increase tolerance for daily activity MET  2. Increase PROM to within protocol guidelines MET  3. Increased strength by 1/3 MMT grade in R UE to increase tolerance for ADL and work activities. MET  4. Pt to tolerate HEP to improve ROM and independence with ADL's MET     Long Term Goals: 15 weeks  1.Pt to like 20 lbs without increase in pain for return to work MET  2.Increase AROM to equal to left Progressing   3.Increase strength to >/= 4/5 in R UE to increase tolerance for ADL and work activities. MET  4. Pt goal: Pt to perform weighted overhead activities without increase in pain to be able to return to softball MET  5. Pt will have improved gcode of CJ (20-40% limited) on FOTO shoulder in order to demonstrate true functional improvement. MET    PLAN     Plan of care Certification: 2/17/23 to 3/31/23.    Cont with 90  feet throwing program and monitor response     Dada Saldivar, PT

## 2023-04-10 ENCOUNTER — CLINICAL SUPPORT (OUTPATIENT)
Dept: REHABILITATION | Facility: HOSPITAL | Age: 23
End: 2023-04-10
Payer: MEDICAID

## 2023-04-10 DIAGNOSIS — M25.611 DECREASED RIGHT SHOULDER RANGE OF MOTION: Primary | ICD-10-CM

## 2023-04-10 DIAGNOSIS — R29.898 WEAKNESS OF SHOULDER: ICD-10-CM

## 2023-04-10 DIAGNOSIS — S43.431D SUPERIOR GLENOID LABRUM LESION OF RIGHT SHOULDER, SUBSEQUENT ENCOUNTER: ICD-10-CM

## 2023-04-10 PROCEDURE — 97110 THERAPEUTIC EXERCISES: CPT | Mod: PO

## 2023-04-10 NOTE — PROGRESS NOTES
"[facility]   Physical Therapy Treatment Note    Name: Long Brunner  Clinic Number: [id number]    Therapy Diagnosis:   Encounter Diagnoses   Name Primary?    Decreased right shoulder range of motion Yes    Superior glenoid labrum lesion of right shoulder, subsequent encounter     Weakness of shoulder          Physician: Tj Fletcher IV, MD    Visit Date: 4/10/2023    Physician Orders: PT Eval and Treat   Medical Diagnosis from Referral: Superior glenoid labrum lesion of right shoulder, subsequent encounter [S43.431D]  Evaluation Date: 11/9/2022  Authorization Period Expiration: 4/12/23  Plan of Care Expiration: NEW 3/31/23  Progress Note Due: 3/17/23  Visit # / Visits authorized: 19/22   FOTO: 4/3 EPISODE COMPLETED AND CLOSED  PTA Visit #: 0/5     Precautions: Standard, POST OP  (surgery date 11/1/22)    Time In: 7:45  Time Out: 8:25  Total Billable Time: 40 mins    The following procedures were performed in the Right Shoulder:  Arthroscopic Shoulder Debridement - Extensive (70865)  Arthroscopic Posterior Labral Repair (54627)  Arthroscopic Superior Labrum (SLAP) Repair ([id number])         SUBJECTIVE     Pt reports: no soreness or pain. Wants to know stretches that he can do before he throws to "loosen up"    He was compliant with home exercise program.  Response to previous treatment: felt fine  Functional change: ongoing    Pain: 0/10  Location: right SLAP, rotator cuff debridement, posterior labral repair    OBJECTIVE     Not performed    TREATMENT      Long received the treatments listed below:    Bold = performed     All billed as therex based on medicaid guidelines  therapeutic exercises to develop strength, endurance, ROM, flexibility, and posture for 40 minutes including:    Supine Dowel 5# 2x10   IR/ER eccentrics in SL with 5# weight - 10x10s   Self ER and IR mobilization with mob band 10x5s  Prone 90/90 ER 3x10 3#   Face pulls Black TB 2x12  1/ kneeling OH press with 10# DB and band " "pulling resisting ER - 3x8   Body blade - 90 degrees ER 3x30s, D2 pattern 2x10 with slow pace  Standing ER/IR @90 degrees 3x30s fast pace with Blue theraband   Med ball toss: baseball pass, 12# x20  UE Dynamic 6 warm up - field goal, scarecrow, airplane, wings, reach arm crossed  Throwing softball- warm up 25 feet, throwing 90 feet for 3 sets of 25 throws   1/2 kneeling ball toss in throwing motion yellow ball - x20   Prone Y,T,I 3# 2x12  Forward and reverse crawling - 2 laps ea  Ball toss against trampoline red ball x30    Every minute on the minute for 9 mins:  Minute 1 - Ball toss in throwing motion against trampoline   Minute 2 - Prone on physio T's - ball toss red and contralateral iso hold 2#   Minute 3 - Ball plyo on wall         IR/ER eccentrics in SL with red monster band 2x10  Med ball toss in batting position 10# 3x10   Med ball slams 10# 3x10   Supine external rotation MR- 2x10   Supine Abd with MR - 2x10   1/2 kneeling overhead press with reverse 10# KB   Ball toss with pt in 1/2 kneeling in throwing position   Downward dog > vinyasa - 2x10   Overhead carries reverse KB 20# 2 big laps around gym   Lateral hand walks over 6" box in plank position x10   +supine lat eccentric with 4# dowel 15x5"   Yellow med ball on wall ABCs, A-Z x2 each R   IR/ER @ 90 degrees with cables 2x10 7# both ways  wall clocks (1, 3, and 5) with yellow theraband at wrist 2x5   Push up with plus on the wall x15  shoulder PNF D2 (sword from pocket) with yellow theraband 2x8    manual therapy techniques: Joint mobilizations and Soft tissue Mobilization were applied for 00 minutes, including:  PNF contract relax followed by ER stretching 3x30s  Sub scap release   Pec minor release      PATIENT EDUCATION AND HOME EXERCISES     Home Exercises Provided and Patient Education Provided     Education provided:   - eduction on condition  - home exercise program     Written Home Exercises Provided: yes. Exercises were reviewed and Alves was " able to demonstrate them prior to the end of the session.  Long demonstrated good  understanding of the education provided. See EMR under Patient Instructions for exercises provided during therapy sessions.    ASSESSMENT   Long was able to play softball over the weekend and throw the ball in from right field with no increase in pain although he does report play was somewhat limited as compared to usual games. We will repeat one time at final visit on 4/12 and discharge to home exercise program.     Long is progressing towards his goals.   Pt prognosis is Good.     Pt will continue to benefit from skilled outpatient physical therapy to address the deficits listed in the problem list box on initial evaluation, provide pt/family education and to maximize pt's level of independence in the home and community environment.     Pt's spiritual, cultural and educational needs considered and pt agreeable to plan of care and goals.     Anticipated barriers to physical therapy: scheduling    Goals:   Short Term Goals:  5 weeks   1.Report decreased R shoulder pain < / =  0/10  to increase tolerance for daily activity MET  2. Increase PROM to within protocol guidelines MET  3. Increased strength by 1/3 MMT grade in R UE to increase tolerance for ADL and work activities. MET  4. Pt to tolerate HEP to improve ROM and independence with ADL's MET     Long Term Goals: 15 weeks  1.Pt to like 20 lbs without increase in pain for return to work MET  2.Increase AROM to equal to left Progressing   3.Increase strength to >/= 4/5 in R UE to increase tolerance for ADL and work activities. MET  4. Pt goal: Pt to perform weighted overhead activities without increase in pain to be able to return to softball MET  5. Pt will have improved gcode of CJ (20-40% limited) on FOTO shoulder in order to demonstrate true functional improvement. MET    PLAN     Plan of care Certification: 2/17/23 to 3/31/23.    Cont with 90 feet throwing program and monitor  response     Alicia Ly, PT

## 2023-04-12 ENCOUNTER — CLINICAL SUPPORT (OUTPATIENT)
Dept: REHABILITATION | Facility: HOSPITAL | Age: 23
End: 2023-04-12
Payer: MEDICAID

## 2023-04-12 DIAGNOSIS — S43.431D SUPERIOR GLENOID LABRUM LESION OF RIGHT SHOULDER, SUBSEQUENT ENCOUNTER: ICD-10-CM

## 2023-04-12 DIAGNOSIS — M25.611 DECREASED RIGHT SHOULDER RANGE OF MOTION: Primary | ICD-10-CM

## 2023-04-12 DIAGNOSIS — R29.898 WEAKNESS OF SHOULDER: ICD-10-CM

## 2023-04-12 PROCEDURE — 97110 THERAPEUTIC EXERCISES: CPT | Mod: PO

## 2023-04-12 NOTE — PROGRESS NOTES
"[facility]   Physical Therapy Treatment Note / Discharge Visit    Name: Long Brunner  Clinic Number: [id number]    Therapy Diagnosis:   Encounter Diagnoses   Name Primary?    Decreased right shoulder range of motion Yes    Superior glenoid labrum lesion of right shoulder, subsequent encounter     Weakness of shoulder            Physician: Tj Fletcher IV, MD    Visit Date: 4/12/2023    Physician Orders: PT Eval and Treat   Medical Diagnosis from Referral: Superior glenoid labrum lesion of right shoulder, subsequent encounter [S43.431D]  Evaluation Date: 11/9/2022  Authorization Period Expiration: 4/12/23  Plan of Care Expiration: NEW 3/31/23  Progress Note Due: 3/17/23  Visit # / Visits authorized: 19/22   FOTO: 4/3 EPISODE COMPLETED AND CLOSED  PTA Visit #: 0/5     Precautions: Standard, POST OP  (surgery date 11/1/22)    Time In: 9:30  Time Out: 10:23  Total Billable Time: 53 mins    The following procedures were performed in the Right Shoulder:  Arthroscopic Shoulder Debridement - Extensive (19052)  Arthroscopic Posterior Labral Repair (41124)  Arthroscopic Superior Labrum (SLAP) Repair (81152)         SUBJECTIVE     Pt reports: no soreness or pain. Wants to know stretches that he can do before he throws to "loosen up"    He was compliant with home exercise program.  Response to previous treatment: felt fine  Functional change: ongoing    Pain: 0/10  Location: right SLAP, rotator cuff debridement, posterior labral repair    OBJECTIVE     Active Range of Motion:   Shoulder Right Left   Flexion 165 > 171 180   Abduction 180 180   ER at 90 85 >90 78   IR 73 To stomach at 0   Reach behind head yes yes   Reach behind back  Yes yes      Strength:  Shoulder Right Left   Flexion NT 5/5   Abduction  NT 5/5   ER at 0 NT 5/5   INTERNAL ROTATION at 0 NT 5/5        TREATMENT      Long received the treatments listed below:    Bold = performed     All billed as therex based on medicaid " "guidelines  therapeutic exercises to develop strength, endurance, ROM, flexibility, and posture for 53 minutes including:    Supine Dowel 5# 2x10   IR/ER eccentrics in SL with 5# weight - 10x10s   Self ER and IR mobilization with mob band 10x5s  Prone 90/90 ER 3x10 3#   Face pulls Black TB 2x12  1/ kneeling OH press with 10# DB and band pulling resisting ER - 3x8   Body blade - 90 degrees ER 3x30s, D2 pattern 2x10 with slow pace  Standing ER/IR @90 degrees 3x30s fast pace with Blue theraband   Med ball toss: baseball pass, 12# x20  UE Dynamic 6 warm up - field goal, scarecrow, airplane, wings, reach arm crossed  Throwing softball- warm up 25 feet, throwing 90 feet for 3 sets of 25 throws   1/2 kneeling ball toss in throwing motion yellow ball - x20   Prone Y,T,I 3# 2x12  Forward and reverse crawling - 2 laps ea  Ball toss against trampoline red ball x30    Every minute on the minute for 0 mins:  Minute 1 - Ball toss in throwing motion against trampoline   Minute 2 - Prone on physio T's - ball toss red and contralateral iso hold 2#   Minute 3 - Ball plyo on wall         IR/ER eccentrics in SL with red monster band 2x10  Med ball toss in batting position 10# 3x10   Med ball slams 10# 3x10   Supine external rotation MR- 2x10   Supine Abd with MR - 2x10   1/2 kneeling overhead press with reverse 10# KB   Ball toss with pt in 1/2 kneeling in throwing position   Downward dog > vinyasa - 2x10   Overhead carries reverse KB 20# 2 big laps around gym   Lateral hand walks over 6" box in plank position x10   +supine lat eccentric with 4# dowel 15x5"   Yellow med ball on wall ABCs, A-Z x2 each R   IR/ER @ 90 degrees with cables 2x10 7# both ways  wall clocks (1, 3, and 5) with yellow theraband at wrist 2x5   Push up with plus on the wall x15  shoulder PNF D2 (sword from pocket) with yellow theraband 2x8    manual therapy techniques: Joint mobilizations and Soft tissue Mobilization were applied for 00 minutes, including:  PNF " contract relax followed by ER stretching 3x30s  Sub scap release   Pec minor release      PATIENT EDUCATION AND HOME EXERCISES     Home Exercises Provided and Patient Education Provided     Education provided:   - eduction on condition  - home exercise program     Written Home Exercises Provided: yes. Exercises were reviewed and Long was able to demonstrate them prior to the end of the session.  Long demonstrated good  understanding of the education provided. See EMR under Patient Instructions for exercises provided during therapy sessions.    ASSESSMENT   Long was able to throw in clinic again today with no pain and no limitation to report. He has regained full strength and near full range of motion and is ready for full return to sport. Goals have been met and he is discharged to home exercise program today.     Long is progressing towards his goals.   Pt prognosis is Good.     Pt will continue to benefit from skilled outpatient physical therapy to address the deficits listed in the problem list box on initial evaluation, provide pt/family education and to maximize pt's level of independence in the home and community environment.     Pt's spiritual, cultural and educational needs considered and pt agreeable to plan of care and goals.     Anticipated barriers to physical therapy: scheduling    Goals:   Short Term Goals:  5 weeks   1.Report decreased R shoulder pain < / =  0/10  to increase tolerance for daily activity MET  2. Increase PROM to within protocol guidelines MET  3. Increased strength by 1/3 MMT grade in R UE to increase tolerance for ADL and work activities. MET  4. Pt to tolerate HEP to improve ROM and independence with ADL's MET     Long Term Goals: 15 weeks  1.Pt to like 20 lbs without increase in pain for return to work MET  2.Increase AROM to equal to left   3.Increase strength to >/= 4/5 in R UE to increase tolerance for ADL and work activities. MET  4. Pt goal: Pt to perform weighted overhead  activities without increase in pain to be able to return to softball MET  5. Pt will have improved gcode of CJ (20-40% limited) on FOTO shoulder in order to demonstrate true functional improvement. MET    PLAN     Plan of care Certification: 2/17/23 to 3/31/23.    Pt is discharged from skilled PT today    Alicia Ly, PT

## 2023-05-03 ENCOUNTER — OFFICE VISIT (OUTPATIENT)
Dept: ORTHOPEDICS | Facility: CLINIC | Age: 23
End: 2023-05-03
Payer: MEDICAID

## 2023-05-03 VITALS
BODY MASS INDEX: 25.9 KG/M2 | HEART RATE: 96 BPM | HEIGHT: 66 IN | WEIGHT: 161.19 LBS | SYSTOLIC BLOOD PRESSURE: 132 MMHG | DIASTOLIC BLOOD PRESSURE: 77 MMHG

## 2023-05-03 DIAGNOSIS — M75.101 TEAR OF RIGHT SUPRASPINATUS TENDON: ICD-10-CM

## 2023-05-03 DIAGNOSIS — S43.431D SUPERIOR GLENOID LABRUM LESION OF RIGHT SHOULDER, SUBSEQUENT ENCOUNTER: Primary | ICD-10-CM

## 2023-05-03 PROCEDURE — 99999 PR PBB SHADOW E&M-EST. PATIENT-LVL III: CPT | Mod: PBBFAC,,, | Performed by: ORTHOPAEDIC SURGERY

## 2023-05-03 PROCEDURE — 3078F DIAST BP <80 MM HG: CPT | Mod: CPTII,,, | Performed by: ORTHOPAEDIC SURGERY

## 2023-05-03 PROCEDURE — 3075F PR MOST RECENT SYSTOLIC BLOOD PRESS GE 130-139MM HG: ICD-10-PCS | Mod: CPTII,,, | Performed by: ORTHOPAEDIC SURGERY

## 2023-05-03 PROCEDURE — 3075F SYST BP GE 130 - 139MM HG: CPT | Mod: CPTII,,, | Performed by: ORTHOPAEDIC SURGERY

## 2023-05-03 PROCEDURE — 99213 OFFICE O/P EST LOW 20 MIN: CPT | Mod: PBBFAC,PN | Performed by: ORTHOPAEDIC SURGERY

## 2023-05-03 PROCEDURE — 3008F BODY MASS INDEX DOCD: CPT | Mod: CPTII,,, | Performed by: ORTHOPAEDIC SURGERY

## 2023-05-03 PROCEDURE — 99213 OFFICE O/P EST LOW 20 MIN: CPT | Mod: S$PBB,,, | Performed by: ORTHOPAEDIC SURGERY

## 2023-05-03 PROCEDURE — 3008F PR BODY MASS INDEX (BMI) DOCUMENTED: ICD-10-PCS | Mod: CPTII,,, | Performed by: ORTHOPAEDIC SURGERY

## 2023-05-03 PROCEDURE — 1159F MED LIST DOCD IN RCRD: CPT | Mod: CPTII,,, | Performed by: ORTHOPAEDIC SURGERY

## 2023-05-03 PROCEDURE — 1160F PR REVIEW ALL MEDS BY PRESCRIBER/CLIN PHARMACIST DOCUMENTED: ICD-10-PCS | Mod: CPTII,,, | Performed by: ORTHOPAEDIC SURGERY

## 2023-05-03 PROCEDURE — 3078F PR MOST RECENT DIASTOLIC BLOOD PRESSURE < 80 MM HG: ICD-10-PCS | Mod: CPTII,,, | Performed by: ORTHOPAEDIC SURGERY

## 2023-05-03 PROCEDURE — 1159F PR MEDICATION LIST DOCUMENTED IN MEDICAL RECORD: ICD-10-PCS | Mod: CPTII,,, | Performed by: ORTHOPAEDIC SURGERY

## 2023-05-03 PROCEDURE — 99213 PR OFFICE/OUTPT VISIT, EST, LEVL III, 20-29 MIN: ICD-10-PCS | Mod: S$PBB,,, | Performed by: ORTHOPAEDIC SURGERY

## 2023-05-03 PROCEDURE — 1160F RVW MEDS BY RX/DR IN RCRD: CPT | Mod: CPTII,,, | Performed by: ORTHOPAEDIC SURGERY

## 2023-05-03 PROCEDURE — 99999 PR PBB SHADOW E&M-EST. PATIENT-LVL III: ICD-10-PCS | Mod: PBBFAC,,, | Performed by: ORTHOPAEDIC SURGERY

## 2023-05-03 NOTE — PROGRESS NOTES
[facility], Orthopedics and Sports Medicine  [facility]    Shoulder Post-op Visit  05/03/2023   Pre-op Diagnosis:    Right shoulder pain  SLAP Tear            Supraspinatus tendon tear, partial        Posterior Labral Tear     Procedure: 11/1/2022  Arthroscopic Shoulder Debridement - Extensive    Arthroscopic Posterior Labral Repair   Arthroscopic Superior Labrum (SLAP) Repair    Subjective:      Long Brunner is a 22 y.o. male who is now 6 month status post right shoulder surgery. The patient is not having any pain. The patient denies fever, wound drainage, increasing redness, pus, increasing pain, increasing swelling. Post op problems reported: none.    Doing well. Discharged from PT 4/12.  Has resumed play and no shoulder problems noted by patient. No pain. Good shoulder motion.      Objective:      General    Constitutional: He is oriented to person, place, and time. He appears well-developed and well-nourished.   Neurological: He is alert and oriented to person, place, and time.   Psychiatric: He has a normal mood and affect.         Right Shoulder Exam     Tenderness   The patient is experiencing no tenderness.    Range of Motion   Active abduction:  170   Forward Flexion:  90   Adduction: 0   External Rotation 0 degrees:  70   Internal rotation 0 degrees:  T5     Left Shoulder Exam   Left shoulder exam is normal.       Imaging:  None today      Assessment:       The patient is status post right shoulder surgery. The primary encounter diagnosis was Superior glenoid labrum lesion of right shoulder, subsequent encounter. A diagnosis of Tear of right supraspinatus tendon was also pertinent to this visit.  Doing well postoperatively. Continuation of post-op rehab course is recommended at this time. All of the patient's questions were answered.         Plan:      Tylenol 650mg TID PRN for pain.  Continue home exercise program.  Follow up as needed.       Tj Fletcher IV, MD  Assistant  Professor of Clinical Orthopedics  Department of Orthopedic Surgery  [facility]  Office: [phone]  Website: [email or web address]        No orders of the defined types were placed in this encounter.

## (undated) DEVICE — GOWN POLY REINF BRTH SLV LG

## (undated) DEVICE — SUPPORT SLING SHOT II MEDIUM

## (undated) DEVICE — DRAPE STERI U-SHAPED 47X51IN

## (undated) DEVICE — PAD ABDOMINAL 5X9 STERILE

## (undated) DEVICE — GLOVE BIOGEL 7.0

## (undated) DEVICE — CANNULA CRYTSAL PT 5.75MMX7CM

## (undated) DEVICE — KIT FIBERTAK CURVED SPEAR 1.8M

## (undated) DEVICE — LASSO SUTURE QUICKPASS 90DEG

## (undated) DEVICE — DRAPE TOP 53X102IN

## (undated) DEVICE — DRESSING AQUACEL SACRAL 9 X 9

## (undated) DEVICE — TAPE ADH MEDIPORE 4 X 10YDS

## (undated) DEVICE — ELECTRODE REM PLYHSV RETURN 9

## (undated) DEVICE — NDL SPINAL 18GX3.5 SPINOCAN

## (undated) DEVICE — GOWN POLY REINF BRTH SLV XL

## (undated) DEVICE — BNDG COFLEX FOAM LF2 ST 6X5YD

## (undated) DEVICE — GOWN POLY REINF X-LONG 2XL

## (undated) DEVICE — SEE MEDLINE ITEM 157216

## (undated) DEVICE — PACK FLUID CONTROL SHOULDER

## (undated) DEVICE — DRESSING XEROFORM FOIL PK 1X8

## (undated) DEVICE — SUT ETHILON 3-0 PS2 18 BLK

## (undated) DEVICE — GLOVE BIOGEL ECLIPSE SZ 7.5

## (undated) DEVICE — GLOVE 7.5 PROTEXIS PI ORTHO PF

## (undated) DEVICE — DRESSING XEROFORM 1X8IN

## (undated) DEVICE — NDL HYPO REG 25G X 1 1/2

## (undated) DEVICE — APPLICATOR CHLORAPREP ORN 26ML

## (undated) DEVICE — PAD SHOULDER CARE POLAR

## (undated) DEVICE — PACK BASIC

## (undated) DEVICE — DRAPE U SPLIT SHEET 54X76IN

## (undated) DEVICE — GLOVE BIOGEL SKINSENSE PI 8.5

## (undated) DEVICE — TOWEL OR DISP STRL BLUE 4/PK

## (undated) DEVICE — DRAPE THREE-QTR REINF 53X77IN

## (undated) DEVICE — Device

## (undated) DEVICE — SUT MONOCRYL 3-0 PS-2 UND

## (undated) DEVICE — GLOVE BIOGEL PIMICRO INDIC 8.5

## (undated) DEVICE — MAT SUCTION PUDDLEVAC ORANGE

## (undated) DEVICE — TUBING SUC UNIV W/CONN 12FT

## (undated) DEVICE — PROBE ARTHO ENERGY 90 DEG

## (undated) DEVICE — SUPPORT ULNA NERVE PROTECTOR

## (undated) DEVICE — BLADE SURG CARBON STEEL SZ11

## (undated) DEVICE — ABLATOR EXTENDED LENGTH

## (undated) DEVICE — SPONGE GAUZE 16PLY 4X4

## (undated) DEVICE — BLADE SURG #15 CARBON STEEL

## (undated) DEVICE — MANIFOLD 4 PORT

## (undated) DEVICE — PILLOW FACE ADLT FOAM W/VELCRO

## (undated) DEVICE — ALCOHOL 70% ISOP W/GREEN 16OZ

## (undated) DEVICE — KIT SHOULDER POSITIONER SPIDER

## (undated) DEVICE — ADHESIVE DERMABOND ADVANCED

## (undated) DEVICE — SYR 50CC LL

## (undated) DEVICE — CANNULA TWIST IN 7MM X 7CM

## (undated) DEVICE — TAPE MEDIPORE 1 X 10YD

## (undated) DEVICE — COVER OVERHEAD SURG LT BLUE

## (undated) DEVICE — TUBE SET INFLOW/OUTFLOW

## (undated) DEVICE — SPONGE DERMACEA GAUZE 4X4

## (undated) DEVICE — CUBE COLD THERAPY POLAR CARE

## (undated) DEVICE — COVER PROXIMA MAYO STAND

## (undated) DEVICE — PACK SURGERY START

## (undated) DEVICE — GLOVE BIOGEL 7.5

## (undated) DEVICE — PAD ABD TNDRSRB 7.5X8 STRL

## (undated) DEVICE — SUT ETHILON 3-0 BLK MONO FS

## (undated) DEVICE — GAUZE SPONGE 4X4 12PLY

## (undated) DEVICE — DRAPE STERI-DRAPE 1000 17X11IN